# Patient Record
Sex: FEMALE | Race: ASIAN | NOT HISPANIC OR LATINO | Employment: FULL TIME | URBAN - METROPOLITAN AREA
[De-identification: names, ages, dates, MRNs, and addresses within clinical notes are randomized per-mention and may not be internally consistent; named-entity substitution may affect disease eponyms.]

---

## 2017-03-11 ENCOUNTER — OFFICE VISIT (OUTPATIENT)
Dept: FAMILY MEDICINE | Facility: CLINIC | Age: 42
End: 2017-03-11
Payer: COMMERCIAL

## 2017-03-11 VITALS
TEMPERATURE: 97.3 F | DIASTOLIC BLOOD PRESSURE: 80 MMHG | RESPIRATION RATE: 15 BRPM | HEART RATE: 78 BPM | OXYGEN SATURATION: 97 % | SYSTOLIC BLOOD PRESSURE: 122 MMHG | WEIGHT: 190 LBS | BODY MASS INDEX: 34.75 KG/M2

## 2017-03-11 DIAGNOSIS — J11.1 INFLUENZA-LIKE ILLNESS: Primary | ICD-10-CM

## 2017-03-11 DIAGNOSIS — D84.9 IMMUNOSUPPRESSION (H): ICD-10-CM

## 2017-03-11 DIAGNOSIS — Z20.828 EXPOSURE TO INFLUENZA: ICD-10-CM

## 2017-03-11 PROCEDURE — 99213 OFFICE O/P EST LOW 20 MIN: CPT | Performed by: PHYSICIAN ASSISTANT

## 2017-03-11 RX ORDER — OSELTAMIVIR PHOSPHATE 75 MG/1
75 CAPSULE ORAL 2 TIMES DAILY
Qty: 10 CAPSULE | Refills: 0 | Status: SHIPPED | OUTPATIENT
Start: 2017-03-11 | End: 2017-03-16

## 2017-03-11 NOTE — NURSING NOTE
"Chief Complaint   Patient presents with     URI       Initial /80 (BP Location: Right arm, Patient Position: Chair, Cuff Size: Adult Regular)  Pulse 78  Temp 97.3  F (36.3  C) (Tympanic)  Resp 15  Wt 190 lb (86.2 kg)  SpO2 97%  BMI 34.75 kg/m2 Estimated body mass index is 34.75 kg/(m^2) as calculated from the following:    Height as of 10/14/16: 5' 2\" (1.575 m).    Weight as of this encounter: 190 lb (86.2 kg).  Medication Reconciliation: complete    "

## 2017-03-11 NOTE — PROGRESS NOTES
SUBJECTIVE:                                                    Everette Mayen is a 41 year old female who presents to clinic today for the following health issues:    ENT Symptoms             Symptoms: cc Present Absent Comment   Fever/Chills  x     Fatigue  x     Muscle Aches   x    Eye Irritation   x    Sneezing   x    Nasal Serg/Drg  x     Sinus Pressure/Pain   x    Loss of smell   x    Dental pain   x    Sore Throat   x    Swollen Glands   x    Ear Pain/Fullness   x    Cough  x     Wheeze   x    Chest Pain   x    Shortness of breath   x    Rash   x    Other   x      Symptom duration:  9 days   Symptom severity:  moderate   Treatments tried:  cough suppressant, sinus medication   Contacts:  son       HPI additional notes:   Chief Complaint   Patient presents with     URI     Gaviria presents today with likely influenza. Patient's son is currently hospitalized with influenza and pneumonia. Patient developed URI sx just over 1 wk ago, but hasn't been able to make it in due to care of her son. Patient reports fever and chills, fatigue, congestion, cough, ST, hoarseness. Patient starting to feel better, but is concerned about son coming home. Patient also on Humira for psoriasis, so is immunosuppressed.       ROS:  Skin: negative  Eyes: negative  Ears/Nose/Throat: as above  Respiratory: as above  Cardiovascular: negative  Gastrointestinal: negative  Genitourinary: negative  Musculoskeletal: negative  Neurologic: negative  Psychiatric: negative  Hematologic/Lymphatic/Immunologic: negative  Endocrine: negative    Chart Review:  History   Smoking Status     Never Smoker   Smokeless Tobacco     Never Used       Patient Active Problem List   Diagnosis      PSORIASIS currently on Humira     Allergic rhinitis     Vitamin D deficiency     Peptic Acid Gastritis     Situational depression     Health Care Home     Benign essential hypertension     Non morbid obesity due to excess calories     Episodic mood disorder (H)      Gastritis without bleeding, unspecified chronicity, unspecified gastritis type     Acute left-sided low back pain without sciatica since 10-15     Bipolar disorder, in full remission, most recent episode depressed (H)     Past Surgical History   Procedure Laterality Date     No history of surgery       Problem list, Medication list, Allergies, Medical/Social/Surg hx reviewed in [facility], updated as appropriate.   OBJECTIVE:                                                    /80 (BP Location: Right arm, Patient Position: Chair, Cuff Size: Adult Regular)  Pulse 78  Temp 97.3  F (36.3  C) (Tympanic)  Resp 15  Wt 190 lb (86.2 kg)  SpO2 97%  BMI 34.75 kg/m2  Body mass index is 34.75 kg/(m^2).  GENERAL: fatigued but non-toxic appearing, in no acute distress  EYES: Grossly normal to inspection, no discharge, no injection  HENT: Ear canals normal; TMs pearly gray without effusion. Nasal mucosa erythematous, scant rhinorrhea. Oral mucous membranes moist, no lesions or ulcerations. Pharynx pink. Uvula midline.  NECK: Non-tender, no adenopathy.  RESP: lungs clear to auscultation - no rales, no rhonchi, no wheezes  CV: regular rate and rhythm, normal S1 S2.  No peripheral edema.  SKIN: no suspicious lesions, no rashes  PSYCH: Able to articulate logical thoughts. Affect is normal.    Diagnostic test results: none     ASSESSMENT/PLAN:                                                          ICD-10-CM    1. Influenza-like illness R69 oseltamivir (TAMIFLU) 75 MG capsule   2. Exposure to influenza Z20.828 oseltamivir (TAMIFLU) 75 MG capsule   3. Immunosuppression (H) D89.9      Given exposure and reported sx, high suspicion of influenza, therefore testing was deferred. Discussed with patient limited efficacy of Tamiflu this far into illness, but given immunosuppressed state is at high risk for prolonged/delayed recovery and complications so may be of benefit to her. Discussed no concerns about re-infection of son when he returns  as they are both fighting the same virus, main concern will be her recovery as she will likely be unable to get adequate rest when tending to him. At this time, potential benefits of Tamiflu outweigh risks, so will treat with 5 day course. Take entire course as directed. Continue use of Tylenol, ibuprofen prn pain, fever. Get plenty of rest, maintain adequate hydration, practice good handwashing.    Please see patient instructions for treatment details.    Follow up in 7-10 days if not improving as anticipated, sooner PRN.    Shelby Agee PA-C  [facility]

## 2017-03-11 NOTE — MR AVS SNAPSHOT
After Visit Summary   3/11/2017    Everette Mayen    MRN: [id number]           Patient Information     Date Of Birth          1975        Visit Information        Provider Department      3/11/2017 9:20 AM Shelby Agee PA-C [facility]        Today's Diagnoses     Influenza-like illness    -  1    Exposure to influenza        Immunosuppression (H)           Follow-ups after your visit        Who to contact     If you have questions or need follow up information about today's clinic visit or your schedule please contact [facility] directly at [phone].  Normal or non-critical lab and imaging results will be communicated to you by [employer], letter or phone within 4 business days after the clinic has received the results. If you do not hear from us within 7 days, please contact the clinic through [employer] or phone. If you have a critical or abnormal lab result, we will notify you by phone as soon as possible.  Submit refill requests through [employer] or call your pharmacy and they will forward the refill request to us. Please allow 3 business days for your refill to be completed.          Additional Information About Your Visit        MyChart Information     [employer] gives you secure access to your electronic health record. If you see a primary care provider, you can also send messages to your care team and make appointments. If you have questions, please call your primary care clinic.  If you do not have a primary care provider, please call [phone] and they will assist you.        Care EveryWhere ID     This is your Care EveryWhere ID. This could be used by other organizations to access your [facility] medical records  [id number]        Your Vitals Were     Pulse Temperature Respirations Pulse Oximetry BMI (Body Mass Index)       78 97.3  F (36.3  C) (Tympanic) 15 97% 34.75 kg/m2        Blood Pressure from Last 3 Encounters:    03/11/17 122/80   10/14/16 100/70   06/02/16 138/87    Weight from Last 3 Encounters:   03/11/17 190 lb (86.2 kg)   10/14/16 186 lb (84.4 kg)   06/02/16 178 lb 14.4 oz (81.1 kg)              Today, you had the following     No orders found for display         Today's Medication Changes          These changes are accurate as of: 3/11/17  9:41 AM.  If you have any questions, ask your nurse or doctor.               Start taking these medicines.        Dose/Directions    oseltamivir 75 MG capsule   Commonly known as:  TAMIFLU   Used for:  Exposure to influenza, Influenza-like illness   Started by:  Shelby Agee PA-C        Dose:  75 mg   Take 1 capsule (75 mg) by mouth 2 times daily for 5 days   Quantity:  10 capsule   Refills:  0            Where to get your medicines      These medications were sent to [facility] [address] [place] [address] [address] AT [address] [place] [address] & [address] [facility] [address]    Hours:  24-hours Phone:  [phone]     oseltamivir 75 MG capsule                Primary Care Provider Office Phone # Fax #    Corrie Torres [phone]       [facility] [address] [facility] [address]        Thank you!     Thank you for choosing [facility]  for your care. Our goal is always to provide you with excellent care. Hearing back from our patients is one way we can continue to improve our services. Please take a few minutes to complete the written survey that you may receive in the mail after your visit with us. Thank you!             Your Updated Medication List - Protect others around you: Learn how to safely use, store and throw away your medicines at www.disposemymeds.org.          This list is accurate as of: 3/11/17  9:41 AM.  Always use your most recent med list.                   Brand Name Dispense Instructions for use    * adalimumab 40 MG/0.8ML pen kit    HUMIRA    2 each    Inject  0.8 mLs (40 mg) Subcutaneous every 14 days       * adalimumab 40 MG/0.8ML prefilled syringe kit    HUMIRA    2 each    Inject 0.8 mLs (40 mg) Subcutaneous every 14 days       amLODIPine-benazepril 10-20 MG per capsule    LOTREL    90 capsule    Take 1 capsule by mouth daily       clobetasol 0.05 % ointment    TEMOVATE     Apply topically 2 times daily as needed (Psoriasis)       lithium 300 MG capsule      take 1 a night       oseltamivir 75 MG capsule    TAMIFLU    10 capsule    Take 1 capsule (75 mg) by mouth 2 times daily for 5 days       traZODone 50 MG tablet    DESYREL     Take 1/2 to 1 at night as needed for sleep       * Notice:  This list has 2 medication(s) that are the same as other medications prescribed for you. Read the directions carefully, and ask your doctor or other care provider to review them with you.

## 2017-05-02 ENCOUNTER — OFFICE VISIT (OUTPATIENT)
Dept: FAMILY MEDICINE | Facility: CLINIC | Age: 42
End: 2017-05-02
Payer: COMMERCIAL

## 2017-05-02 VITALS
RESPIRATION RATE: 18 BRPM | WEIGHT: 186 LBS | SYSTOLIC BLOOD PRESSURE: 120 MMHG | DIASTOLIC BLOOD PRESSURE: 80 MMHG | OXYGEN SATURATION: 96 % | HEART RATE: 91 BPM | BODY MASS INDEX: 34.23 KG/M2 | TEMPERATURE: 97.7 F | HEIGHT: 62 IN

## 2017-05-02 DIAGNOSIS — F31.76 BIPOLAR DISORDER, IN FULL REMISSION, MOST RECENT EPISODE DEPRESSED (H): ICD-10-CM

## 2017-05-02 DIAGNOSIS — L40.0 PSORIASIS VULGARIS: ICD-10-CM

## 2017-05-02 DIAGNOSIS — I10 BENIGN ESSENTIAL HYPERTENSION: ICD-10-CM

## 2017-05-02 DIAGNOSIS — E66.09 NON MORBID OBESITY DUE TO EXCESS CALORIES: ICD-10-CM

## 2017-05-02 DIAGNOSIS — Z00.00 ROUTINE GENERAL MEDICAL EXAMINATION AT A HEALTH CARE FACILITY: Primary | ICD-10-CM

## 2017-05-02 PROCEDURE — 99396 PREV VISIT EST AGE 40-64: CPT | Performed by: FAMILY MEDICINE

## 2017-05-02 NOTE — PATIENT INSTRUCTIONS
Preventive Health Recommendations  Female Ages 40 to 49    Yearly exam:     See your health care provider every year in order to  1. Review health changes.   2. Discuss preventive care.    3. Review your medicines if your doctor prescribed any.      Get a Pap test every three years (unless you have an abnormal result and your provider advises testing more often).      If you get Pap tests with HPV test, you only need to test every 5 years, unless you have an abnormal result. You do not need a Pap test if your uterus was removed (hysterectomy) and you have not had cancer.      You should be tested each year for STDs (sexually transmitted diseases), if you're at risk.       Ask your doctor if you should have a mammogram.      Have a colonoscopy (test for colon cancer) if someone in your family has had colon cancer or polyps before age 50.       Have a cholesterol test every 5 years.       Have a diabetes test (fasting glucose) after age 45. If you are at risk for diabetes, you should have this test every 3 years.    Shots: Get a flu shot each year. Get a tetanus shot every 10 years.     Nutrition:     Eat at least 5 servings of fruits and vegetables each day.    Eat whole-grain bread, whole-wheat pasta and brown rice instead of white grains and rice.    Talk to your provider about Calcium and Vitamin D.     Lifestyle    Exercise at least 150 minutes a week (an average of 30 minutes a day, 5 days a week). This will help you control your weight and prevent disease.    Limit alcohol to one drink per day.    No smoking.     Wear sunscreen to prevent skin cancer.    See your dentist every six months for an exam and cleaning.    1.  Weight Loss Tips  1. Do not eat after 6 hrs before your expected bedtime  2. Have your heaviest meal for breakfast, a slightly lighter meal at lunch and a snack 6 hrs before bed  3. No sugar/calorie drinks except milk ie no fruit juice, pop, alcohol.  4. Drink milk 30min before meals to  decrease your hunger. Also it is excellent as part of your last meal of the day snack  5. Drink lots of water  6. Increase fiber in diet: all bran cereal, salads, popcorn etc  7. Have only one small serving of fruit a day about 1/2 cup (as this is high in sugar)  8. EXERCISE is the bottom line. Without it, you will gain weight even on a low calorie diet. Best if done 2-3X a day as can    Being overweight contributes to high blood pressure and high cholesterol, both of which cause heart attacks, strokes and kidney failure, prediabetes and diabetes, arthritis, and liver disease     2. Please do your breast exam every mo, when you  Change the  calendar page or set an alarm on your cell phone Do a  visual check for dimples, inversion or indentation or any different position of the nipple Feel manually  for any 1cm or larger  size mass ie about the size of an almond Be sure to cover the entire area of both breasts : this extends back to the back on either side and from the collar bone to the bottom of the breasts where you can begin to feel ribs.    2. To decrease your blood pressure:   1) decrease your salt in your diet   2) increase greens   3) decrease red meat   4) increase fiber in diet   5) increase exercise    3. . Schedule your mammo at [facility]  At [address] at [phone]      4. 5.5 mo  For a physical

## 2017-05-02 NOTE — PROGRESS NOTES
SUBJECTIVE:     CC: Everette Mayen is an 41 year old woman who presents for preventive health visit.     Healthy Habits:    Do you get at least three servings of calcium containing foods daily (dairy, green leafy vegetables, etc.)? yes    Amount of exercise or daily activities, outside of work: 2 day(s) per week    Problems taking medications regularly No    Medication side effects: No    Have you had an eye exam in the past two years? yes    Do you see a dentist twice per year? yes    Do you have sleep apnea, excessive snoring or daytime drowsiness?no            Today's PHQ-2 Score:   PHQ-2 ( 1999 [employer]) 5/2/2017 5/28/2016   Q1: Little interest or pleasure in doing things 0 -   Q2: Feeling down, depressed or hopeless 0 -   PHQ-2 Score 0 -   Little interest or pleasure in doing things - Not at all   Feeling down, depressed or hopeless - Not at all   PHQ-2 Score - 0       Abuse: Current or Past(Physical, Sexual or Emotional)- No  Do you feel safe in your environment - Yes    Social History   Substance Use Topics     Smoking status: Never Smoker     Smokeless tobacco: Never Used     Alcohol use 0.0 oz/week     0 Standard drinks or equivalent per week      Comment: < 1 drink / week     The patient does not drink >3 drinks per day nor >7 drinks per week.    Recent Labs   Lab Test  02/07/14   1000   CHOL  171   HDL  44*   LDL  102   TRIG  122   CHOLHDLRATIO  4.0       Reviewed orders with patient.  Reviewed health maintenance and updated orders accordingly - Yes    Mammo Decision Support:  Mammogram not appropriate for this patient based on age.    Pertinent mammograms are reviewed under the imaging tab.  History of abnormal Pap smear:   Last 3 Pap Results:   PAP (no units)   Date Value   06/02/2016 NIL   07/26/2012 NIL   09/16/2008 NIL       Reviewed and updated as needed this visit by clinical staff  Tobacco  Allergies  Meds  Problems  Med Hx  Surg Hx  Fam Hx  Soc Hx          Reviewed and updated as  needed this visit by Provider            ROS:  C: NEGATIVE for fever, chills, change in weight  I: NEGATIVE for worrisome rashes, moles or lesions  E: NEGATIVE for vision changes or irritation  ENT: NEGATIVE for ear, mouth and throat problems  R: NEGATIVE for significant cough or SOB  B: NEGATIVE for masses, tenderness or discharge  CV: NEGATIVE for chest pain, palpitations or peripheral edema  GI: NEGATIVE for nausea, abdominal pain, heartburn, or change in bowel habits  : NEGATIVE for unusual urinary or vaginal symptoms. Periods are regular.  M: NEGATIVE for significant arthralgias or myalgia  N: NEGATIVE for weakness, dizziness or paresthesias  P: NEGATIVE for changes in mood or affect    Problem list, Medication list, Allergies, and Medical/Social/Surgical histories reviewed in [facility] and updated as appropriate.  Labs reviewed in EPIC  OBJECTIVE:     There were no vitals taken for this visit.  EXAM:  GENERAL: healthy, alert, no distress and obese  EYES: Eyes grossly normal to inspection, PERRL and conjunctivae and sclerae normal  HENT: ear canals and TM's normal, nose and mouth without ulcers or lesions  NECK: no adenopathy, no asymmetry, masses, or scars and thyroid normal to palpation  RESP: lungs clear to auscultation - no rales, rhonchi or wheezes  BREAST: normal without masses, tenderness or nipple discharge and no palpable axillary masses or adenopathy  CV: regular rate and rhythm, normal S1 S2, no S3 or S4, no murmur, click or rub, no peripheral edema and peripheral pulses strong  ABDOMEN: soft, nontender, no hepatosplenomegaly, no masses and bowel sounds normal  MS: no gross musculoskeletal defects noted, no edema  SKIN: no suspicious lesions or rashes  NEURO: Normal strength and tone, mentation intact and speech normal  PSYCH: mentation appears normal, affect normal/bright  LYMPH: no cervical, supraclavicular, axillary, or inguinal adenopathy    ASSESSMENT/PLAN:         ICD-10-CM    1. Routine general  "medical examination at a health care facility Z00.00    2. Psoriasis vulgaris L40.0    3. Non morbid obesity due to excess calories E66.09    4. Benign essential hypertension I10    5. Bipolar disorder, in full remission, most recent episode depressed (H)-CONMTROLLED F31.76        COUNSELING:   Reviewed preventive health counseling, as reflected in patient instructions       Regular exercise       Healthy diet/nutrition         reports that she has never smoked. She has never used smokeless tobacco.    Estimated body mass index is 34.75 kg/(m^2) as calculated from the following:    Height as of 10/14/16: 5' 2\" (1.575 m).    Weight as of 3/11/17: 190 lb (86.2 kg).   Weight management plan: Discussed healthy diet and exercise guidelines and patient will follow up in 6 months in clinic to re-evaluate.    Counseling Resources:  ATP IV Guidelines  Pooled Cohorts Equation Calculator  Breast Cancer Risk Calculator  FRAX Risk Assessment  ICSI Preventive Guidelines  Dietary Guidelines for Americans, 2010  [employer] MyPlate  ASA Prophylaxis  Lung CA Screening    Patient Instructions     Preventive Health Recommendations  Female Ages 40 to 49    Yearly exam:     See your health care provider every year in order to  1. Review health changes.   2. Discuss preventive care.    3. Review your medicines if your doctor prescribed any.      Get a Pap test every three years (unless you have an abnormal result and your provider advises testing more often).      If you get Pap tests with HPV test, you only need to test every 5 years, unless you have an abnormal result. You do not need a Pap test if your uterus was removed (hysterectomy) and you have not had cancer.      You should be tested each year for STDs (sexually transmitted diseases), if you're at risk.       Ask your doctor if you should have a mammogram.      Have a colonoscopy (test for colon cancer) if someone in your family has had colon cancer or polyps before age 50. "       Have a cholesterol test every 5 years.       Have a diabetes test (fasting glucose) after age 45. If you are at risk for diabetes, you should have this test every 3 years.    Shots: Get a flu shot each year. Get a tetanus shot every 10 years.     Nutrition:     Eat at least 5 servings of fruits and vegetables each day.    Eat whole-grain bread, whole-wheat pasta and brown rice instead of white grains and rice.    Talk to your provider about Calcium and Vitamin D.     Lifestyle    Exercise at least 150 minutes a week (an average of 30 minutes a day, 5 days a week). This will help you control your weight and prevent disease.    Limit alcohol to one drink per day.    No smoking.     Wear sunscreen to prevent skin cancer.    See your dentist every six months for an exam and cleaning.    1.  Weight Loss Tips  1. Do not eat after 6 hrs before your expected bedtime  2. Have your heaviest meal for breakfast, a slightly lighter meal at lunch and a snack 6 hrs before bed  3. No sugar/calorie drinks except milk ie no fruit juice, pop, alcohol.  4. Drink milk 30min before meals to decrease your hunger. Also it is excellent as part of your last meal of the day snack  5. Drink lots of water  6. Increase fiber in diet: all bran cereal, salads, popcorn etc  7. Have only one small serving of fruit a day about 1/2 cup (as this is high in sugar)  8. EXERCISE is the bottom line. Without it, you will gain weight even on a low calorie diet. Best if done 2-3X a day as can    Being overweight contributes to high blood pressure and high cholesterol, both of which cause heart attacks, strokes and kidney failure, prediabetes and diabetes, arthritis, and liver disease           Corrie Torres MD  [facility]

## 2017-05-02 NOTE — MR AVS SNAPSHOT
After Visit Summary   5/2/2017    Everette Mayen    MRN: [id number]           Patient Information     Date Of Birth          1975        Visit Information        Provider Department      5/2/2017 9:00 AM Corrie Torres MD [facility]        Today's Diagnoses     Routine general medical examination at a health care facility    -  1    Psoriasis vulgaris         PSORIASIS currently on Humira          Care Instructions      Preventive Health Recommendations  Female Ages 40 to 49    Yearly exam:     See your health care provider every year in order to  1. Review health changes.   2. Discuss preventive care.    3. Review your medicines if your doctor prescribed any.      Get a Pap test every three years (unless you have an abnormal result and your provider advises testing more often).      If you get Pap tests with HPV test, you only need to test every 5 years, unless you have an abnormal result. You do not need a Pap test if your uterus was removed (hysterectomy) and you have not had cancer.      You should be tested each year for STDs (sexually transmitted diseases), if you're at risk.       Ask your doctor if you should have a mammogram.      Have a colonoscopy (test for colon cancer) if someone in your family has had colon cancer or polyps before age 50.       Have a cholesterol test every 5 years.       Have a diabetes test (fasting glucose) after age 45. If you are at risk for diabetes, you should have this test every 3 years.    Shots: Get a flu shot each year. Get a tetanus shot every 10 years.     Nutrition:     Eat at least 5 servings of fruits and vegetables each day.    Eat whole-grain bread, whole-wheat pasta and brown rice instead of white grains and rice.    Talk to your provider about Calcium and Vitamin D.     Lifestyle    Exercise at least 150 minutes a week (an average of 30 minutes a day, 5 days a week). This will help you control your  weight and prevent disease.    Limit alcohol to one drink per day.    No smoking.     Wear sunscreen to prevent skin cancer.    See your dentist every six months for an exam and cleaning.    1.  Weight Loss Tips  1. Do not eat after 6 hrs before your expected bedtime  2. Have your heaviest meal for breakfast, a slightly lighter meal at lunch and a snack 6 hrs before bed  3. No sugar/calorie drinks except milk ie no fruit juice, pop, alcohol.  4. Drink milk 30min before meals to decrease your hunger. Also it is excellent as part of your last meal of the day snack  5. Drink lots of water  6. Increase fiber in diet: all bran cereal, salads, popcorn etc  7. Have only one small serving of fruit a day about 1/2 cup (as this is high in sugar)  8. EXERCISE is the bottom line. Without it, you will gain weight even on a low calorie diet. Best if done 2-3X a day as can    Being overweight contributes to high blood pressure and high cholesterol, both of which cause heart attacks, strokes and kidney failure, prediabetes and diabetes, arthritis, and liver disease     2. Please do your breast exam every mo, when you  Change the  calendar page or set an alarm on your cell phone Do a  visual check for dimples, inversion or indentation or any different position of the nipple Feel manually  for any 1cm or larger  size mass ie about the size of an almond Be sure to cover the entire area of both breasts : this extends back to the back on either side and from the collar bone to the bottom of the breasts where you can begin to feel ribs.    2. To decrease your blood pressure:   1) decrease your salt in your diet   2) increase greens   3) decrease red meat   4) increase fiber in diet   5) increase exercise    3. . Schedule your mammo at [facility]  At [address] at [phone]      4. 5.5 mo  For a physical         Follow-ups after your visit        Follow-up notes from your care team     Return in about 6 months (around  "11/2/2017) for Physical Exam.      Your next 10 appointments already scheduled     May 05, 2017  9:30 AM CDT   (Arrive by 9:15 AM)   Return Visit with Moo Montes MD   [facility] Dermatology ([facility])    [address]  [address] [facility] [address] [phone]              Who to contact     If you have questions or need follow up information about today's clinic visit or your schedule please contact [facility] directly at [phone].  Normal or non-critical lab and imaging results will be communicated to you by [employer], letter or phone within 4 business days after the clinic has received the results. If you do not hear from us within 7 days, please contact the clinic through [employer] Payment Systemshart or phone. If you have a critical or abnormal lab result, we will notify you by phone as soon as possible.  Submit refill requests through [employer] or call your pharmacy and they will forward the refill request to us. Please allow 3 business days for your refill to be completed.          Additional Information About Your Visit        MyChart Information     [employer] gives you secure access to your electronic health record. If you see a primary care provider, you can also send messages to your care team and make appointments. If you have questions, please call your primary care clinic.  If you do not have a primary care provider, please call [phone] and they will assist you.        Care EveryWhere ID     This is your Care EveryWhere ID. This could be used by other organizations to access your [place] medical records  [id number]        Your Vitals Were     Pulse Temperature Respirations Height Last Period Pulse Oximetry    91 97.7  F (36.5  C) (Tympanic) 18 5' 2\" (1.575 m) 04/01/2017 (Approximate) 96%    Breastfeeding? BMI (Body Mass Index)                No 34.02 kg/m2           Blood Pressure from Last 3 Encounters:   05/02/17 120/80   03/11/17 122/80 "   10/14/16 100/70    Weight from Last 3 Encounters:   05/02/17 186 lb (84.4 kg)   03/11/17 190 lb (86.2 kg)   10/14/16 186 lb (84.4 kg)              Today, you had the following     No orders found for display       Primary Care Provider Office Phone # Fax #    Corrie Torres [phone]       [facility] [address] [facility] [address]        Thank you!     Thank you for choosing [facility]  for your care. Our goal is always to provide you with excellent care. Hearing back from our patients is one way we can continue to improve our services. Please take a few minutes to complete the written survey that you may receive in the mail after your visit with us. Thank you!             Your Updated Medication List - Protect others around you: Learn how to safely use, store and throw away your medicines at www.disposemymeds.org.          This list is accurate as of: 5/2/17  9:35 AM.  Always use your most recent med list.                   Brand Name Dispense Instructions for use    adalimumab 40 MG/0.8ML prefilled syringe kit    HUMIRA    2 each    Inject 0.8 mLs (40 mg) Subcutaneous every 14 days       amLODIPine-benazepril 10-20 MG per capsule    LOTREL    90 capsule    Take 1 capsule by mouth daily       clobetasol 0.05 % ointment    TEMOVATE     Apply topically 2 times daily as needed (Psoriasis)       lithium 300 MG capsule      take 1 a night       traZODone 50 MG tablet    DESYREL     Take 1/2 to 1 at night as needed for sleep

## 2017-05-02 NOTE — LETTER
My Depression Action Plan  Name: Everette Mayen   Date of Birth 1975  Date: 5/2/2017    My doctor: Corrie Torres   My clinic: [address] [address]  [phone]          GREEN    ZONE   Good Control    What it looks like:     Things are going generally well. You have normal up s and down s. You may even feel depressed from time to time, but bad moods usually last less than a day.   What you need to do:  1. Continue to care for yourself (see self care plan)  2. Check your depression survival kit and update it as needed  3. Follow your physician s recommendations including any medication.  4. Do not stop taking medication unless you consult with your physician first.           YELLOW         ZONE Getting Worse    What it looks like:     Depression is starting to interfere with your life.     It may be hard to get out of bed; you may be starting to isolate yourself from others.    Symptoms of depression are starting to last most all day and this has happened for several days.     You may have suicidal thoughts but they are not constant.   What you need to do:     1. Call your care team, your response to treatment will improve if you keep your care team informed of your progress. Yellow periods are signs an adjustment may need to be made.     2. Continue your self-care, even if you have to fake it!    3. Talk to someone in your support network    4. Open up your depression survival kit           RED    ZONE Medical Alert - Get Help    What it looks like:     Depression is seriously interfering with your life.     You may experience these or other symptoms: You can t get out of bed most days, can t work or engage in other necessary activities, you have trouble taking care of basic hygiene, or basic responsibilities, thoughts of suicide or death that will not go away, self-injurious behavior.     What  you need to do:  1. Call your care team and request a same-day appointment. If they are not available (weekends or after hours) call your local crisis line, emergency room or 911.      Electronically signed by: Corrie Torres, May 2, 2017    Depression Self Care Plan / Survival Kit    Self-Care for Depression  Here s the deal. Your body and mind are really not as separate as most people think.  What you do and think affects how you feel and how you feel influences what you do and think. This means if you do things that people who feel good do, it will help you feel better.  Sometimes this is all it takes.  There is also a place for medication and therapy depending on how severe your depression is, so be sure to consult with your medical provider and/ or Behavioral Health Consultant if your symptoms are worsening or not improving.     In order to better manage my stress, I will:    Exercise  Get some form of exercise, every day. This will help reduce pain and release endorphins, the  feel good  chemicals in your brain. This is almost as good as taking antidepressants!  This is not the same as joining a gym and then never going! (they count on that by the way ) It can be as simple as just going for a walk or doing some gardening, anything that will get you moving.      Hygiene   Maintain good hygiene (Get out of bed in the morning, Make your bed, Brush your teeth, Take a shower, and Get dressed like you were going to work, even if you are unemployed).  If your clothes don't fit try to get ones that do.    Diet  I will strive to eat foods that are good for me, drink plenty of water, and avoid excessive sugar, caffeine, alcohol, and other mood-altering substances.  Some foods that are helpful in depression are: complex carbohydrates, B vitamins, flaxseed, fish or fish oil, fresh fruits and vegetables.    Psychotherapy  I agree to participate in Individual Therapy (if recommended).    Medication  If prescribed  medications, I agree to take them.  Missing doses can result in serious side effects.  I understand that drinking alcohol, or other illicit drug use, may cause potential side effects.  I will not stop my medication abruptly without first discussing it with my provider.    Staying Connected With Others  I will stay in touch with my friends, family members, and my primary care provider/team.    Use your imagination  Be creative.  We all have a creative side; it doesn t matter if it s oil painting, sand castles, or mud pies! This will also kick up the endorphins.    Witness Beauty  (AKA stop and smell the roses) Take a look outside, even in mid-winter. Notice colors, textures. Watch the squirrels and birds.     Service to others  Be of service to others.  There is always someone else in need.  By helping others we can  get out of ourselves  and remember the really important things.  This also provides opportunities for practicing all the other parts of the program.    Humor  Laugh and be silly!  Adjust your TV habits for less news and crime-drama and more comedy.    Control your stress  Try breathing deep, massage therapy, biofeedback, and meditation. Find time to relax each day.     My support system    Clinic Contact:  Phone number:    Contact 1:  Phone number:    Contact 2:  Phone number:    Quaker/:  Phone number:    Therapist:  Phone number:    [facility] crisis center:    Phone number:    Other community support:  Phone number:

## 2017-05-02 NOTE — NURSING NOTE
"Chief Complaint   Patient presents with     Physical     /80  Pulse 91  Temp 97.7  F (36.5  C) (Tympanic)  Resp 18  Ht 5' 2\" (1.575 m)  Wt 186 lb (84.4 kg)  LMP 04/01/2017 (Approximate)  SpO2 96%  Breastfeeding? No  BMI 34.02 kg/m2 Estimated body mass index is 34.02 kg/(m^2) as calculated from the following:    Height as of this encounter: 5' 2\" (1.575 m).    Weight as of this encounter: 186 lb (84.4 kg).  BP completed using cuff size: regular   Alea Hernandez CMA    Health Maintenance Due   Topic Date Due     PHQ-9 Q6 MONTHS (NO INBASKET)  04/13/2017     Health Maintenance reviewed at today's visit patient asked to schedule/complete:   Depression:  Patient agrees to schedule    "

## 2017-05-03 ASSESSMENT — PATIENT HEALTH QUESTIONNAIRE - PHQ9: SUM OF ALL RESPONSES TO PHQ QUESTIONS 1-9: 1

## 2017-05-05 ENCOUNTER — OFFICE VISIT (OUTPATIENT)
Dept: DERMATOLOGY | Facility: CLINIC | Age: 42
End: 2017-05-05

## 2017-05-05 DIAGNOSIS — L40.0 PSORIASIS VULGARIS: ICD-10-CM

## 2017-05-05 DIAGNOSIS — Z79.620 ADALIMUMAB (HUMIRA) LONG-TERM USE: ICD-10-CM

## 2017-05-05 DIAGNOSIS — L40.9 PSORIASIS: ICD-10-CM

## 2017-05-05 DIAGNOSIS — L40.0 PSORIASIS VULGARIS: Primary | ICD-10-CM

## 2017-05-05 LAB
ALBUMIN SERPL-MCNC: 3.9 G/DL (ref 3.4–5)
ALP SERPL-CCNC: 74 U/L (ref 40–150)
ALT SERPL W P-5'-P-CCNC: 22 U/L (ref 0–50)
ANION GAP SERPL CALCULATED.3IONS-SCNC: 8 MMOL/L (ref 3–14)
AST SERPL W P-5'-P-CCNC: 18 U/L (ref 0–45)
BILIRUB SERPL-MCNC: 0.5 MG/DL (ref 0.2–1.3)
BUN SERPL-MCNC: 12 MG/DL (ref 7–30)
CALCIUM SERPL-MCNC: 8.7 MG/DL (ref 8.5–10.1)
CHLORIDE SERPL-SCNC: 107 MMOL/L (ref 94–109)
CO2 SERPL-SCNC: 25 MMOL/L (ref 20–32)
CREAT SERPL-MCNC: 0.42 MG/DL (ref 0.52–1.04)
ERYTHROCYTE [DISTWIDTH] IN BLOOD BY AUTOMATED COUNT: 13 % (ref 10–15)
GFR SERPL CREATININE-BSD FRML MDRD: ABNORMAL ML/MIN/1.7M2
GLUCOSE SERPL-MCNC: 74 MG/DL (ref 70–99)
HBV SURFACE AG SERPL QL IA: NONREACTIVE
HCT VFR BLD AUTO: 43 % (ref 35–47)
HCV AB SERPL QL IA: NORMAL
HGB BLD-MCNC: 14.2 G/DL (ref 11.7–15.7)
HIV 1+2 AB+HIV1 P24 AG SERPL QL IA: NORMAL
MCH RBC QN AUTO: 28.5 PG (ref 26.5–33)
MCHC RBC AUTO-ENTMCNC: 33 G/DL (ref 31.5–36.5)
MCV RBC AUTO: 86 FL (ref 78–100)
PLATELET # BLD AUTO: 325 10E9/L (ref 150–450)
POTASSIUM SERPL-SCNC: 4 MMOL/L (ref 3.4–5.3)
PROT SERPL-MCNC: 8.7 G/DL (ref 6.8–8.8)
RBC # BLD AUTO: 4.99 10E12/L (ref 3.8–5.2)
SODIUM SERPL-SCNC: 140 MMOL/L (ref 133–144)
WBC # BLD AUTO: 4.8 10E9/L (ref 4–11)

## 2017-05-05 RX ORDER — CLOBETASOL PROPIONATE 0.5 MG/ML
SOLUTION TOPICAL
Qty: 50 ML | Refills: 3 | Status: SHIPPED | OUTPATIENT
Start: 2017-05-05 | End: 2021-12-28

## 2017-05-05 ASSESSMENT — PAIN SCALES - GENERAL: PAINLEVEL: NO PAIN (0)

## 2017-05-05 NOTE — MR AVS SNAPSHOT
After Visit Summary   5/5/2017    Everette Mayen    MRN: [id number]           Patient Information     Date Of Birth          1975        Visit Information        Provider Department      5/5/2017 9:30 AM Moo Montes MD [facility] Dermatology        Today's Diagnoses     Psoriasis vulgaris    -  1    Adalimumab (Humira) long-term use        Psoriasis           Follow-ups after your visit        Follow-up notes from your care team     Return in about 6 months (around 11/5/2017).      Who to contact     Please call your clinic at [phone] to:    Ask questions about your health    Make or cancel appointments    Discuss your medicines    Learn about your test results    Speak to your doctor   If you have compliments or concerns about an experience at your clinic, or if you wish to file a complaint, please contact [facility] Patient Relations at [phone] or email us at [email or web address]         Additional Information About Your Visit        MyChart Information     [employer] gives you secure access to your electronic health record. If you see a primary care provider, you can also send messages to your care team and make appointments. If you have questions, please call your primary care clinic.  If you do not have a primary care provider, please call [phone] and they will assist you.      [employer] is an electronic gateway that provides easy, online access to your medical records. With [employer], you can request a clinic appointment, read your test results, renew a prescription or communicate with your care team.     To access your existing account, please contact your [facility] Clinic or call [phone] for assistance.        Care EveryWhere ID     This is your Care EveryWhere ID. This could be used by other organizations to access your [place] medical records  [id number]        Your Vitals Were     Last Period                    04/01/2017 (Approximate)            Blood Pressure from Last 3 Encounters:   05/02/17 120/80   03/11/17 122/80   10/14/16 100/70    Weight from Last 3 Encounters:   05/02/17 84.4 kg (186 lb)   03/11/17 86.2 kg (190 lb)   10/14/16 84.4 kg (186 lb)              We Performed the Following     Hepatitis B surface antigen     Hepatitis C antibody     M Tuberculosis by Quantiferon          Today's Medication Changes          These changes are accurate as of: 5/5/17 11:59 PM.  If you have any questions, ask your nurse or doctor.               These medicines have changed or have updated prescriptions.        Dose/Directions    * clobetasol 0.05 % ointment   Commonly known as:  TEMOVATE   This may have changed:  Another medication with the same name was added. Make sure you understand how and when to take each.        Apply topically 2 times daily as needed (Psoriasis)   Refills:  0       * clobetasol 0.05 % external solution   Commonly known as:  TEMOVATE   This may have changed:  You were already taking a medication with the same name, and this prescription was added. Make sure you understand how and when to take each.   Used for:  Psoriasis vulgaris   Changed by:  Moo Montes MD        Apply to scalp daily as needed for psoriasis flare   Quantity:  50 mL   Refills:  3       * Notice:  This list has 2 medication(s) that are the same as other medications prescribed for you. Read the directions carefully, and ask your doctor or other care provider to review them with you.         Where to get your medicines      These medications were sent to [facility] - [place], [place] - [address] [place] [address]  [address] [place] [address], [facility] [address]    Hours:  Mon-Fri 8:30am-5:00pm Toll Free [phone] Phone:  [phone]     adalimumab 40 MG/0.8ML prefilled syringe kit    clobetasol 0.05 % external solution                Primary Care Provider Office Phone # Fax #    Corrie Brooks  MD Brian [phone]       [facility] [address] [address]  [facility] [address]        Thank you!     Thank you for choosing [facility] DERMATOLOGY  for your care. Our goal is always to provide you with excellent care. Hearing back from our patients is one way we can continue to improve our services. Please take a few minutes to complete the written survey that you may receive in the mail after your visit with us. Thank you!             Your Updated Medication List - Protect others around you: Learn how to safely use, store and throw away your medicines at www.disposemymeds.org.          This list is accurate as of: 5/5/17 11:59 PM.  Always use your most recent med list.                   Brand Name Dispense Instructions for use    adalimumab 40 MG/0.8ML prefilled syringe kit    HUMIRA    2 each    Inject 0.8 mLs (40 mg) Subcutaneous every 14 days       amLODIPine-benazepril 10-20 MG per capsule    LOTREL    90 capsule    Take 1 capsule by mouth daily       * clobetasol 0.05 % ointment    TEMOVATE     Apply topically 2 times daily as needed (Psoriasis)       * clobetasol 0.05 % external solution    TEMOVATE    50 mL    Apply to scalp daily as needed for psoriasis flare       lithium 300 MG capsule      take 1 a night       traZODone 50 MG tablet    DESYREL     Take 1/2 to 1 at night as needed for sleep       * Notice:  This list has 2 medication(s) that are the same as other medications prescribed for you. Read the directions carefully, and ask your doctor or other care provider to review them with you.

## 2017-05-05 NOTE — PROGRESS NOTES
"[facility] Dermatology Note      Dermatology Problem List:  1. Psoriasis: On Humira 40 mg [id number] and clobetasol spot treatment to scalp and elbows  - previous 80-90% TBSA involvement.  2. Seborrheic keratosis  3. Acrochordon    Encounter Date: Dec 2, 2016    CC:  Chief Complaint   Patient presents with     Derm Problem     Everette is here today for a psoriasis follow up. Everette notes\" my psoriasis is doing really good\"         History of Present Illness:  Ms. Everette Mayen is a 41 year old female who presents as a follow-up for psoriasis on Humira 40mg SC q2w. She is doing well. Denies joint pain. Mild involvement of elbows but doesn't even feel the need to treat them though she has  clobetasol 0.05% ointment available.    No fevers, chills, weight loss. Did have influenza over the winter, held humira.       She denies any known exposure to TB. She has no other skin concerns and otherwise feels well.    Past Medical History:   Patient Active Problem List   Diagnosis      PSORIASIS currently on Humira     Allergic rhinitis     Vitamin D deficiency     Peptic Acid Gastritis     Situational depression     Health Care Home     Benign essential hypertension     Non morbid obesity due to excess calories     Episodic mood disorder (H)     Gastritis without bleeding, unspecified chronicity, unspecified gastritis type     Acute left-sided low back pain without sciatica since 10-15     Bipolar disorder, in full remission, most recent episode depressed (H)     Past Medical History   Diagnosis Date     Depressive disorder, not elsewhere classified      Other psoriasis      Allergic rhinitis, cause unspecified      Unspecified complication of pregnancy, unspecified as to episode of care 3/09     Willis's/2nd trimester termination     Hypertension 2008     Past Surgical History   Procedure Laterality Date     No history of surgery         Social History:  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     Family History:  There is no family history of skin cancer.    Medications:  Current Outpatient Prescriptions   Medication Sig Dispense Refill     amLODIPine-benazepril (LOTREL) 10-20 MG per capsule Take 1 capsule by mouth daily 90 capsule 3     traZODone (DESYREL) 50 MG tablet Take 1/2 to 1 at night as needed for sleep       lithium 300 MG capsule Take  2 at night       adalimumab (HUMIRA) 40 MG/0.8ML pen kit Inject 0.8 mLs (40 mg) Subcutaneous every 14 days 2 each 5     clobetasol (TEMOVATE) 0.05 % ointment Apply topically 2 times daily as needed (Psoriasis)       Allergies   Allergen Reactions     Lamotrigine      Other reaction(s): Rash     Sulfa Drugs Hives     Other reaction(s): Lupus Erythematous         Review of Systems:  -As per HPI  -Constitutional: The patient denies fatigue, fevers, chills, unintended weight loss, night sweats, cough, increasing fatigue,sensory/neuropathy changes.  -HEENT: Patient denies nonhealing oral sores.  -Skin:   No additional skin concerns.    Physical exam:  Vitals: There were no vitals taken for this visit.  GEN: This is a well developed, well-nourished female in no acute distress, in a pleasant mood.    LYMPH: No cervical lymphadenopathy  SKIN:Focused exam, which includes the head/face, neck, both arms, lower leg   -On posterior scalp scaly, pink patch with loosely adherent scale.   -On elbows bilaterally, discrete light pink papules with white, dry scale, similar papule on R lower leg  -No other lesions of concern on areas examined.       Impression/Plan:  1. Psoriasis: Well-controlled with improvement in PASI > 90% on adalimumab. Patient does not describe any side effects of therapy today. No increased respiratory infections, no lymphadenopathy. Due for annual  laboratory evaluation today. She denies rheumatologic symptoms.      Continue Humira 40 mg l20diesl.      Continue clobetasol 0.05% ointment as spot treatment to affected areas on body, clobetasol 0.05% to  scalp daily as needed     Quant gold, HIV, hep B/C,  AST/ALT and CBC        CC Dr. Corrie Torres on close of this encounter.  Follow-up in 6 months, earlier for new or changing lesions.     Staff Involved:  This patient was seen and staffed with Dr. Montes,  Dermatology attending.     Corina Cain MD  Medicine/Dermatology, PGY-4  (p) [phone]  The entire visit (except PFSH) was performed by myself with the student acting as a scribe.  Moo Montes MD  FAAD

## 2017-05-05 NOTE — NURSING NOTE
Dermatology Rooming Note    Everette Mayen's goals for this visit include:   Chief Complaint   Patient presents with     Derm Problem     Everette is here today for a psoriasis follow up.      Marian Lua MA

## 2017-05-05 NOTE — LETTER
"5/5/2017     RE: Everette Mayen  [address] [address]     Dear Colleague,    Thank you for referring your patient, Everette Mayen, to the [facility] DERMATOLOGY at [facility]. Please see a copy of my visit note below.    [facility] Dermatology Note      Dermatology Problem List:  1. Psoriasis: On Humira 40 mg [id number] and clobetasol spot treatment to scalp and elbows  - previous 80-90% TBSA involvement.  2. Seborrheic keratosis  3. Acrochordon    Encounter Date: Dec 2, 2016    CC:  Chief Complaint   Patient presents with     Derm Problem     Everette is here today for a psoriasis follow up. Everette notes\" my psoriasis is doing really good\"         History of Present Illness:  Ms. Everette Mayen is a 41 year old female who presents as a follow-up for psoriasis on Humira 40mg SC q2w. She is doing well. Denies joint pain. Mild involvement of elbows but doesn't even feel the need to treat them though she has  clobetasol 0.05% ointment available.    No fevers, chills, weight loss. Did have influenza over the winter, held humira.       She denies any known exposure to TB. She has no other skin concerns and otherwise feels well.    Past Medical History:   Patient Active Problem List   Diagnosis      PSORIASIS currently on Humira     Allergic rhinitis     Vitamin D deficiency     Peptic Acid Gastritis     Situational depression     Health Care Home     Benign essential hypertension     Non morbid obesity due to excess calories     Episodic mood disorder (H)     Gastritis without bleeding, unspecified chronicity, unspecified gastritis type     Acute left-sided low back pain without sciatica since 10-15     Bipolar disorder, in full remission, most recent episode depressed (H)     Past Medical History   Diagnosis Date     Depressive disorder, not elsewhere classified      Other psoriasis      Allergic rhinitis, cause unspecified      Unspecified " complication of pregnancy, unspecified as to episode of care 3/09     Willis's/2nd trimester termination     Hypertension 2008     Past Surgical History   Procedure Laterality Date     No history of surgery         Social History:  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     Family History:  There is no family history of skin cancer.    Medications:  Current Outpatient Prescriptions   Medication Sig Dispense Refill     amLODIPine-benazepril (LOTREL) 10-20 MG per capsule Take 1 capsule by mouth daily 90 capsule 3     traZODone (DESYREL) 50 MG tablet Take 1/2 to 1 at night as needed for sleep       lithium 300 MG capsule Take  2 at night       adalimumab (HUMIRA) 40 MG/0.8ML pen kit Inject 0.8 mLs (40 mg) Subcutaneous every 14 days 2 each 5     clobetasol (TEMOVATE) 0.05 % ointment Apply topically 2 times daily as needed (Psoriasis)       Allergies   Allergen Reactions     Lamotrigine      Other reaction(s): Rash     Sulfa Drugs Hives     Other reaction(s): Lupus Erythematous         Review of Systems:  -As per HPI  -Constitutional: The patient denies fatigue, fevers, chills, unintended weight loss, night sweats, cough, increasing fatigue,sensory/neuropathy changes.  -HEENT: Patient denies nonhealing oral sores.  -Skin:   No additional skin concerns.    Physical exam:  Vitals: There were no vitals taken for this visit.  GEN: This is a well developed, well-nourished female in no acute distress, in a pleasant mood.    LYMPH: No cervical lymphadenopathy  SKIN:Focused exam, which includes the head/face, neck, both arms, lower leg   -On posterior scalp scaly, pink patch with loosely adherent scale.   -On elbows bilaterally, discrete light pink papules with white, dry scale, similar papule on R lower leg  -No other lesions of concern on areas examined.       Impression/Plan:  1. Psoriasis: Well-controlled with improvement in PASI > 90% on adalimumab. Patient does not describe any side effects of therapy  today. No increased respiratory infections, no lymphadenopathy. Due for annual  laboratory evaluation today. She denies rheumatologic symptoms.      Continue Humira 40 mg [id number].      Continue clobetasol 0.05% ointment as spot treatment to affected areas on body, clobetasol 0.05% to scalp daily as needed     Quant gold, HIV, hep B/C,  AST/ALT and CBC        CC Dr. Corrie Torres on close of this encounter.  Follow-up in 6 months, earlier for new or changing lesions.     Staff Involved:  This patient was seen and staffed with Dr. Montes,  Dermatology attending.     Corina Cain MD  Medicine/Dermatology, PGY-4  (p) [phone]  The entire visit (except PFSH) was performed by myself with the student acting as a scribe.  Moo Montes MD  FAAD    Again, thank you for allowing me to participate in the care of your patient.      Sincerely,    Moo Montes MD

## 2017-05-08 LAB
M TB TUBERC IFN-G BLD QL: NEGATIVE
M TB TUBERC IFN-G/MITOGEN IGNF BLD: 0 IU/ML

## 2017-05-25 ENCOUNTER — RADIANT APPOINTMENT (OUTPATIENT)
Dept: MAMMOGRAPHY | Facility: CLINIC | Age: 42
End: 2017-05-25
Attending: FAMILY MEDICINE
Payer: COMMERCIAL

## 2017-05-25 DIAGNOSIS — Z12.31 VISIT FOR SCREENING MAMMOGRAM: ICD-10-CM

## 2017-05-25 PROCEDURE — G0202 SCR MAMMO BI INCL CAD: HCPCS

## 2017-05-31 ENCOUNTER — TELEPHONE (OUTPATIENT)
Dept: FAMILY MEDICINE | Facility: CLINIC | Age: 42
End: 2017-05-31

## 2017-05-31 DIAGNOSIS — R92.8 ABNORMAL MAMMOGRAM OF RIGHT BREAST: ICD-10-CM

## 2017-05-31 NOTE — TELEPHONE ENCOUNTER
Elgin from [facility] called requesting provider sign orders placed for Dx mammogram. Please see orders from 05/30/2017 and sign if agree with order. Radiology asked this be done today if possible in order for pt to schedule.   Elgin can be called at [phone] with order is signed.

## 2017-08-21 ENCOUNTER — TELEPHONE (OUTPATIENT)
Dept: DERMATOLOGY | Facility: CLINIC | Age: 42
End: 2017-08-21

## 2017-08-21 NOTE — TELEPHONE ENCOUNTER
Prior Authorization Approval    Authorization Effective Date: 8/18/2017  Authorization Expiration Date: 8/18/2018  Medication: Humira 40mg/0.8ml Syringe - PA Approved  Approved Dose/Quantity: 2 per 28 days  Reference #: [id number]   Insurance Company: [employer] - Phone [phone] Fax [phone]  Expected CoPay: $5.00      CoPay Card Available:      Foundation Assistance Needed:    Which Pharmacy is filling the prescription (Not needed for infusion/clinic administered): [facility] - [place], [place] - [facility] [address]  Pharmacy Notified: Yes  Patient Notified: Yes

## 2017-08-21 NOTE — TELEPHONE ENCOUNTER
[facility] Prior Authorization Team   Phone: [phone]  Fax: [phone]    PA Initiation    Medication: Humira 40mg/0.8ml Syringe - PA Initiated  Insurance Company: [employer] - Phone [phone] Fax [phone]  Pharmacy Filling the Rx: [facility] - [place], [place] - [facility] [address]  Filling Pharmacy Phone: [phone]  Filling Pharmacy Fax: [phone]  Start Date: 8/21/2017

## 2017-12-01 ENCOUNTER — OFFICE VISIT (OUTPATIENT)
Dept: DERMATOLOGY | Facility: CLINIC | Age: 42
End: 2017-12-01

## 2017-12-01 DIAGNOSIS — L40.9 PSORIASIS: ICD-10-CM

## 2017-12-01 DIAGNOSIS — Z79.899 ENCOUNTER FOR LONG-TERM (CURRENT) USE OF HIGH-RISK MEDICATION: Primary | ICD-10-CM

## 2017-12-01 DIAGNOSIS — Z79.899 ENCOUNTER FOR LONG-TERM (CURRENT) USE OF HIGH-RISK MEDICATION: ICD-10-CM

## 2017-12-01 LAB
ALT SERPL W P-5'-P-CCNC: 25 U/L (ref 0–50)
AST SERPL W P-5'-P-CCNC: 17 U/L (ref 0–45)
BASOPHILS # BLD AUTO: 0 10E9/L (ref 0–0.2)
BASOPHILS NFR BLD AUTO: 0.6 %
DIFFERENTIAL METHOD BLD: NORMAL
EOSINOPHIL # BLD AUTO: 0.2 10E9/L (ref 0–0.7)
EOSINOPHIL NFR BLD AUTO: 3 %
ERYTHROCYTE [DISTWIDTH] IN BLOOD BY AUTOMATED COUNT: 12.3 % (ref 10–15)
HCT VFR BLD AUTO: 37.6 % (ref 35–47)
HGB BLD-MCNC: 12.2 G/DL (ref 11.7–15.7)
IMM GRANULOCYTES # BLD: 0 10E9/L (ref 0–0.4)
IMM GRANULOCYTES NFR BLD: 0.2 %
LYMPHOCYTES # BLD AUTO: 2.2 10E9/L (ref 0.8–5.3)
LYMPHOCYTES NFR BLD AUTO: 33.5 %
MCH RBC QN AUTO: 28.2 PG (ref 26.5–33)
MCHC RBC AUTO-ENTMCNC: 32.4 G/DL (ref 31.5–36.5)
MCV RBC AUTO: 87 FL (ref 78–100)
MONOCYTES # BLD AUTO: 0.6 10E9/L (ref 0–1.3)
MONOCYTES NFR BLD AUTO: 8.7 %
NEUTROPHILS # BLD AUTO: 3.5 10E9/L (ref 1.6–8.3)
NEUTROPHILS NFR BLD AUTO: 54 %
NRBC # BLD AUTO: 0 10*3/UL
NRBC BLD AUTO-RTO: 0 /100
PLATELET # BLD AUTO: 392 10E9/L (ref 150–450)
RBC # BLD AUTO: 4.32 10E12/L (ref 3.8–5.2)
WBC # BLD AUTO: 6.4 10E9/L (ref 4–11)

## 2017-12-01 RX ORDER — HYDROCORTISONE 25 MG/G
OINTMENT TOPICAL
Qty: 30 G | Refills: 3 | Status: SHIPPED | OUTPATIENT
Start: 2017-12-01 | End: 2020-12-29

## 2017-12-01 ASSESSMENT — PAIN SCALES - GENERAL: PAINLEVEL: NO PAIN (0)

## 2017-12-01 NOTE — MR AVS SNAPSHOT
After Visit Summary   12/1/2017    Everette Mayen    MRN: [id number]           Patient Information     Date Of Birth          1975        Visit Information        Provider Department      12/1/2017 7:45 AM Moo Montes MD [facility] Dermatology        Today's Diagnoses     Encounter for long-term (current) use of high-risk medication    -  1    Psoriasis           Follow-ups after your visit        Who to contact     Please call your clinic at [phone] to:    Ask questions about your health    Make or cancel appointments    Discuss your medicines    Learn about your test results    Speak to your doctor   If you have compliments or concerns about an experience at your clinic, or if you wish to file a complaint, please contact [facility] Patient Relations at [phone] or email us at [email or web address]         Additional Information About Your Visit        MyChart Information     [employer] gives you secure access to your electronic health record. If you see a primary care provider, you can also send messages to your care team and make appointments. If you have questions, please call your primary care clinic.  If you do not have a primary care provider, please call [phone] and they will assist you.      G[employer] is an electronic gateway that provides easy, online access to your medical records. With [employer], you can request a clinic appointment, read your test results, renew a prescription or communicate with your care team.     To access your existing account, please contact your [facility] Clinic or call [phone] for assistance.        Care EveryWhere ID     This is your Care EveryWhere ID. This could be used by other organizations to access your [place] medical records  [id number]         Blood Pressure from Last 3 Encounters:   05/02/17 120/80   03/11/17 122/80   10/14/16 100/70    Weight from Last 3  Encounters:   05/02/17 84.4 kg (186 lb)   03/11/17 86.2 kg (190 lb)   10/14/16 84.4 kg (186 lb)                 Today's Medication Changes          These changes are accurate as of: 12/1/17 11:59 PM.  If you have any questions, ask your nurse or doctor.               Start taking these medicines.        Dose/Directions    hydrocortisone 2.5 % ointment   Used for:  Psoriasis, Encounter for long-term (current) use of high-risk medication   Started by:  Moo Montes MD        Apply twice daily to affected area in the groin when needed x 5 days   Quantity:  30 g   Refills:  3            Where to get your medicines      These medications were sent to [facility] - [place], [place] - [address] [place] [address]  [address] [facility] [address]    Hours:  Mon-Fri 8:30am-5:00pm Toll Free [phone] Phone:  [phone]     adalimumab 40 MG/0.8ML prefilled syringe kit    hydrocortisone 2.5 % ointment                Primary Care Provider Office Phone # Fax #    Corrie Radha Torres [phone] [address] [facility] [address]        Equal Access to Services     CANDELARIO UREÑA AH: Hadii cora ku hadasho Soomaali, waaxda luqadaha, qaybta kaalmada adeegyada, lucero burgess. So [facility] [phone].    ATENCIÓN: Si habla español, tiene a wall disposición servicios gratuitos de asistencia lingüística. Llame al [phone].    We comply with applicable federal civil rights laws and [place] laws. We do not discriminate on the basis of race, color, national origin, age, disability, sex, sexual orientation, or gender identity.            Thank you!     Thank you for choosing [facility] DERMATOLOGY  for your care. Our goal is always to provide you with excellent care. Hearing back from our patients is one way we can continue to improve our services. Please take a few minutes to complete the written survey that you may receive in the mail after  your visit with us. Thank you!             Your Updated Medication List - Protect others around you: Learn how to safely use, store and throw away your medicines at www.disposemymeds.org.          This list is accurate as of: 12/1/17 11:59 PM.  Always use your most recent med list.                   Brand Name Dispense Instructions for use Diagnosis    adalimumab 40 MG/0.8ML prefilled syringe kit    HUMIRA    2 each    Inject 0.8 mLs (40 mg) Subcutaneous every 14 days    Psoriasis, Encounter for long-term (current) use of high-risk medication       amLODIPine-benazepril 10-20 MG per capsule    LOTREL    90 capsule    Take 1 capsule by mouth daily    Benign essential hypertension       * clobetasol 0.05 % ointment    TEMOVATE     Apply topically 2 times daily as needed (Psoriasis)        * clobetasol 0.05 % external solution    TEMOVATE    50 mL    Apply to scalp daily as needed for psoriasis flare    Psoriasis vulgaris       hydrocortisone 2.5 % ointment     30 g    Apply twice daily to affected area in the groin when needed x 5 days    Psoriasis, Encounter for long-term (current) use of high-risk medication       lithium 300 MG capsule      take 1 a night        traZODone 50 MG tablet    DESYREL     Take 1/2 to 1 at night as needed for sleep        * Notice:  This list has 2 medication(s) that are the same as other medications prescribed for you. Read the directions carefully, and ask your doctor or other care provider to review them with you.

## 2017-12-01 NOTE — LETTER
"12/1/2017       RE: Everette Mayen  [address] [facility] [address]     Dear Colleague,    Thank you for referring your patient, Everette Mayen, to the [facility] DERMATOLOGY at [facility]. Please see a copy of my visit note below.    [facility] Dermatology Note      Dermatology Problem List:  1. Psoriasis: On Humira 40 mg [id number] and clobetasol spot treatment to scalp and elbows  - previous 80-90% TBSA involvement.  2. Seborrheic keratosis  3. Acrochordon    Encounter Date: Dec 2, 2016    CC:  Chief Complaint   Patient presents with     Derm Problem     Everette is here today for a psoriasis follow up. Everette notes\" my psoriasis is doing really good\"         History of Present Illness:  Ms. Everette Mayen is a 42 year old female who presents as a follow-up for psoriasis on Humira 40mg SC q2w. Denies any serious infections int he last 6 months, no joint pain, lower back pains. Has developed new plaques on the buttocks but no new lesions in the intertriginous areas. No TB exposures. She is doing well. Uses clobetasol 0.05% ointment when needed. Sometimes gets a little irritation on the vaginal vault after using products with her menstrual cycle which she treats with clobetasol when occurs; this itches.     No fevers, chills, weight loss.    She has no other skin concerns and otherwise feels well.    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     Family History:  There is no family history of skin cancer.    Medications:  Current Outpatient Prescriptions   Medication     adalimumab (HUMIRA) 40 MG/0.8ML prefilled syringe kit     clobetasol (TEMOVATE) 0.05 % external solution     amLODIPine-benazepril (LOTREL) 10-20 MG per capsule     traZODone (DESYREL) 50 MG tablet     lithium 300 MG capsule     clobetasol (TEMOVATE) 0.05 % ointment     No current facility-administered medications for this visit.        Allergies   Allergen Reactions     Lamotrigine      Other reaction(s): Rash     Sulfa Drugs Hives     Other reaction(s): Lupus Erythematous         Review of Systems:  -As per HPI    Physical exam:  Vitals: There were no vitals taken for this visit.  GEN: This is a well developed, well-nourished female in no acute distress, in a pleasant mood.    LYMPH: No cervical lymphadenopathy  SKIN:Focused exam, which includes the head/face, lips, eyelids, neck, both arms, lower leg   - no oral lesions  - scaly well-defined pink plaques on the elbows and inferior abdomen  - right inferior perineal area with a light pink thin well-defined plaque  -No other lesions of concern on areas examined.       Impression/Plan:  1. Psoriasis: Well-controlled with improvement in PASI > 90% on adalimumab. She is likely experiencing koebnerization of the perineal skin which should respond to HCT 2.5% ointment BID prn. She has not had side effects of therapy today. No increased respiratory infections, no lymphadenopathy. Stable.     Continue Humira 40 mg [id number].      Continue clobetasol 0.05% ointment as spot treatment to affected areas on body, clobetasol 0.05% to scalp daily as needed     AST/ALT and CBC  ]    Quant Gold/Hepatitis labs due in 6 months.       CC Dr. Corrie Torres on close of this encounter.  Follow-up in 6 months, earlier for new or changing lesions.     Staff Involved:  This  patient was seen and staffed with Dr. Montes,  Dermatology attending.     Tigist Salas MD  PGY4 Dermatology Resident  I was present during the key portions of the history and physical exam.  I agree with the treatment plan. RIANNA Montes MD

## 2017-12-01 NOTE — PROGRESS NOTES
"[facility] Dermatology Note      Dermatology Problem List:  1. Psoriasis: On Humira 40 mg [id number] and clobetasol spot treatment to scalp and elbows  - previous 80-90% TBSA involvement.  2. Seborrheic keratosis  3. Acrochordon    Encounter Date: Dec 2, 2016    CC:  Chief Complaint   Patient presents with     Derm Problem     Everette is here today for a psoriasis follow up. Everette notes\" my psoriasis is doing really good\"         History of Present Illness:  Ms. Everette Mayen is a 42 year old female who presents as a follow-up for psoriasis on Humira 40mg SC q2w. Denies any serious infections int he last 6 months, no joint pain, lower back pains. Has developed new plaques on the buttocks but no new lesions in the intertriginous areas. No TB exposures. She is doing well. Uses clobetasol 0.05% ointment when needed. Sometimes gets a little irritation on the vaginal vault after using products with her menstrual cycle which she treats with clobetasol when occurs; this itches.     No fevers, chills, weight loss.    She has no other skin concerns and otherwise feels well.    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     Family History:  There is no family history of skin cancer.    Medications:  Current Outpatient Prescriptions   Medication     adalimumab (HUMIRA) 40 MG/0.8ML prefilled syringe kit     clobetasol (TEMOVATE) " 0.05 % external solution     amLODIPine-benazepril (LOTREL) 10-20 MG per capsule     traZODone (DESYREL) 50 MG tablet     lithium 300 MG capsule     clobetasol (TEMOVATE) 0.05 % ointment     No current facility-administered medications for this visit.        Allergies   Allergen Reactions     Lamotrigine      Other reaction(s): Rash     Sulfa Drugs Hives     Other reaction(s): Lupus Erythematous         Review of Systems:  -As per HPI    Physical exam:  Vitals: There were no vitals taken for this visit.  GEN: This is a well developed, well-nourished female in no acute distress, in a pleasant mood.    LYMPH: No cervical lymphadenopathy  SKIN:Focused exam, which includes the head/face, lips, eyelids, neck, both arms, lower leg   - no oral lesions  - scaly well-defined pink plaques on the elbows and inferior abdomen  - right inferior perineal area with a light pink thin well-defined plaque  -No other lesions of concern on areas examined.       Impression/Plan:  1. Psoriasis: Well-controlled with improvement in PASI > 90% on adalimumab. She is likely experiencing koebnerization of the perineal skin which should respond to HCT 2.5% ointment BID prn. She has not had side effects of therapy today. No increased respiratory infections, no lymphadenopathy. Stable.     Continue Humira 40 mg [id number].      Continue clobetasol 0.05% ointment as spot treatment to affected areas on body, clobetasol 0.05% to scalp daily as needed     AST/ALT and CBC  ]    Quant Gold/Hepatitis labs due in 6 months.       CC Dr. Corrie Torres on close of this encounter.  Follow-up in 6 months, earlier for new or changing lesions.     Staff Involved:  This patient was seen and staffed with Dr. Montes,  Dermatology attending.     Tigist Salas MD  PGY4 Dermatology Resident  I was present during the key portions of the history and physical exam.  I agree with the treatment plan. RIANNA Montes MD

## 2017-12-01 NOTE — NURSING NOTE
Dermatology Rooming Note    Everette Mayen's goals for this visit include:   Chief Complaint   Patient presents with     Derm Problem     Psoriasis. Everette notes that she is doing well on the Ana M Howard CMA

## 2017-12-18 NOTE — PROGRESS NOTES
Good news - your labs look great. Continue with treatment as discussed at your visit. (via [employer])

## 2018-01-20 ENCOUNTER — OFFICE VISIT (OUTPATIENT)
Dept: FAMILY MEDICINE | Facility: CLINIC | Age: 43
End: 2018-01-20
Payer: COMMERCIAL

## 2018-01-20 VITALS
TEMPERATURE: 98.2 F | WEIGHT: 188 LBS | DIASTOLIC BLOOD PRESSURE: 68 MMHG | HEART RATE: 85 BPM | SYSTOLIC BLOOD PRESSURE: 116 MMHG | BODY MASS INDEX: 34.6 KG/M2 | OXYGEN SATURATION: 96 % | HEIGHT: 62 IN | RESPIRATION RATE: 14 BRPM

## 2018-01-20 DIAGNOSIS — I10 BENIGN ESSENTIAL HYPERTENSION: ICD-10-CM

## 2018-01-20 DIAGNOSIS — Z23 NEED FOR VACCINATION: ICD-10-CM

## 2018-01-20 DIAGNOSIS — F31.76 BIPOLAR DISORDER, IN FULL REMISSION, MOST RECENT EPISODE DEPRESSED (H): Primary | ICD-10-CM

## 2018-01-20 LAB
ERYTHROCYTE [DISTWIDTH] IN BLOOD BY AUTOMATED COUNT: 13.8 % (ref 10–15)
HCT VFR BLD AUTO: 39 % (ref 35–47)
HGB BLD-MCNC: 12.7 G/DL (ref 11.7–15.7)
LITHIUM SERPL-SCNC: <0.2 MMOL/L (ref 0.6–1.2)
MCH RBC QN AUTO: 26.6 PG (ref 26.5–33)
MCHC RBC AUTO-ENTMCNC: 32.6 G/DL (ref 31.5–36.5)
MCV RBC AUTO: 82 FL (ref 78–100)
PLATELET # BLD AUTO: 367 10E9/L (ref 150–450)
RBC # BLD AUTO: 4.77 10E12/L (ref 3.8–5.2)
WBC # BLD AUTO: 5.8 10E9/L (ref 4–11)

## 2018-01-20 PROCEDURE — 80048 BASIC METABOLIC PNL TOTAL CA: CPT | Performed by: PHYSICIAN ASSISTANT

## 2018-01-20 PROCEDURE — 90714 TD VACC NO PRESV 7 YRS+ IM: CPT | Performed by: PHYSICIAN ASSISTANT

## 2018-01-20 PROCEDURE — 85027 COMPLETE CBC AUTOMATED: CPT | Performed by: PHYSICIAN ASSISTANT

## 2018-01-20 PROCEDURE — 90471 IMMUNIZATION ADMIN: CPT | Performed by: PHYSICIAN ASSISTANT

## 2018-01-20 PROCEDURE — 84443 ASSAY THYROID STIM HORMONE: CPT | Performed by: PHYSICIAN ASSISTANT

## 2018-01-20 PROCEDURE — 99214 OFFICE O/P EST MOD 30 MIN: CPT | Mod: 25 | Performed by: PHYSICIAN ASSISTANT

## 2018-01-20 PROCEDURE — 36415 COLL VENOUS BLD VENIPUNCTURE: CPT | Performed by: PHYSICIAN ASSISTANT

## 2018-01-20 PROCEDURE — 80178 ASSAY OF LITHIUM: CPT | Performed by: PHYSICIAN ASSISTANT

## 2018-01-20 RX ORDER — AMLODIPINE AND BENAZEPRIL HYDROCHLORIDE 10; 20 MG/1; MG/1
1 CAPSULE ORAL DAILY
Qty: 90 CAPSULE | Refills: 3 | Status: SHIPPED | OUTPATIENT
Start: 2018-01-20 | End: 2019-01-14

## 2018-01-20 ASSESSMENT — PATIENT HEALTH QUESTIONNAIRE - PHQ9
10. IF YOU CHECKED OFF ANY PROBLEMS, HOW DIFFICULT HAVE THESE PROBLEMS MADE IT FOR YOU TO DO YOUR WORK, TAKE CARE OF THINGS AT HOME, OR GET ALONG WITH OTHER PEOPLE: SOMEWHAT DIFFICULT
SUM OF ALL RESPONSES TO PHQ QUESTIONS 1-9: 4
SUM OF ALL RESPONSES TO PHQ QUESTIONS 1-9: 4

## 2018-01-20 NOTE — PROGRESS NOTES
SUBJECTIVE:   Everette Mayen is a 42 year old female who presents to clinic today for the following health issues:        Hypertension Follow-up      Outpatient blood pressures are not being checked.    Low Salt Diet: no added salt    Depression and Anxiety Follow-Up    Status since last visit: Stable    Other associated symptoms:None    Complicating factors:     Significant life event: No     Current substance abuse: None    PHQ-9 Score and MyChart F/U Questions 10/14/2016 2017 2018   Total Score 0 1 4   Q9: Suicide Ideation Not at all Not at all Not at all     AVNI-7 SCORE 5/3/2016 2016   Total Score 3 (minimal anxiety) 3 (minimal anxiety)     Answers for HPI/ROS submitted by the patient on 2018   If you checked off any problems, how difficult have these problems made it for you to do your work, take care of things at home, or get along with other people?: Somewhat difficult  PHQ9 TOTAL SCORE: 4    PHQ-9  English  PHQ-9   Any Language  GAD7  Suicide Assessment Five-step Evaluation and Treatment (SAFE-T)        Amount of exercise or physical activity: None    Problems taking medications regularly: No    Medication side effects: none  Diet: low salt      HPI additional notes:   Chief Complaint   Patient presents with     Depression     Recheck     Anxiety     Hypertension     Everette presents today to establish care. Has been seeing [facility], but looking to establish with different PCP. Needs refills on HTN medication; denies issues with SE, does not monitor BP at home. Denies frequent HA, CP, SOB, focal numbness or weakness, visual disturbance.    Patient also hoping PCP can manage psych meds. Developed severe depression during pregnancy in , son found to have microcephaly on routine US (son now 6 y/o, microcephaly and special needs). Managed by Dr Eli Kaur at [facility] (specialized in  and postpartum depression). Mood disorder resolved shortly after birth of her son. In , had crisis  "again; unable to get into psychiatrist for 2 months, so saw Dr Kaur again, dx bipolar depression. Started on lithium and trazodone. Sx entered remission and has been on stable doses since. Reports she did titrate up to 600 mg lithium qHS due to low serum lithium level, however developed severe nausea. Went back to 300 mg qHS, SE resolved, and mood sx remained in remission despite low serum lithium level. Uses trazodone 25-50 mg qHS prn insomnia, uses several times per week but doesn't need nightly.     ROS:    A Comprehensive greater than 10 system review of systems was carried out.    Pertinent positives and negatives are noted above.  Otherwise negative for contributory information.      Chart Review:  History   Smoking Status     Never Smoker   Smokeless Tobacco     Never Used       Patient Active Problem List   Diagnosis      PSORIASIS currently on Humira     Allergic rhinitis     Vitamin D deficiency     Peptic Acid Gastritis     Situational depression     Health Care Home     Benign essential hypertension     Non morbid obesity due to excess calories     Gastritis without bleeding, unspecified chronicity, unspecified gastritis type     Acute left-sided low back pain without sciatica since 10-15     Bipolar disorder, in full remission, most recent episode depressed (H)     Past Surgical History:   Procedure Laterality Date     NO HISTORY OF SURGERY       Problem list, Medication list, Allergies, Medical/Social/Surg hx reviewed in [employer], updated as appropriate.   OBJECTIVE:                                                    /68 (BP Location: Left arm, Patient Position: Sitting, Cuff Size: Adult Regular)  Pulse 85  Temp 98.2  F (36.8  C) (Tympanic)  Resp 14  Ht 5' 2\" (1.575 m)  Wt 188 lb (85.3 kg)  LMP 12/24/2017  SpO2 96%  BMI 34.39 kg/m2  Body mass index is 34.39 kg/(m^2).  GENERAL:  WDWN, no acute distress  PSYCH: pleasant, cooperative  EYES: no discharge, no injection  HENT:  Normocephalic. Moist " mucus membranes.  NECK:  Supple, symmetric  EXTREMITIES:  No gross deformities, moves all 4 limbs spontaneously  SKIN:  Warm and dry, no rash or suspicious lesions    NEUROLOGIC: alert, sensation grossly intact.    Diagnostic test results: none     ASSESSMENT/PLAN:                                                          ICD-10-CM    1. Bipolar disorder, in full remission, most recent episode depressed (H) F31.76 CBC with platelets     Basic metabolic panel     Lithium level     TSH with free T4 reflex   2. Benign essential hypertension I10 Basic metabolic panel     amLODIPine-benazepril (LOTREL) 10-20 MG per capsule   3. Need for vaccination Z23 TD (ADULT, 7+) PRESERVE FREE     Given dx in remission with stable doses of medications, ok to manage psychiatry medications; does not need refills at this time, will contact clinic when she is running low. Will update routine labs, as well as check lithium level. Agree with Dr Kaur: would continue on current dose lithium given sx in remission, even if serum lithium low. No s/s lithium toxicity.    HTN well-controlled on current medication. Patient is tolerating current medication without any major side effects of concerns and current dose seems reasonable too.  Current medication regime is effective. Continue current treatment without any changes. Lotrel refilled as above.    Td updated today; declines flu shot.    Please see patient instructions for treatment details.  30 minutes total spent on this encounter, >50% spent in direct communication with the patient.    Follow up office visit in 3-6 months for medication check and labs, sooner PRN.    Shelby Agee PA-C  [facility]

## 2018-01-20 NOTE — MR AVS SNAPSHOT
After Visit Summary   1/20/2018    Everette Mayen    MRN: [id number]           Patient Information     Date Of Birth          1975        Visit Information        Provider Department      1/20/2018 8:00 AM Shelby Agee PA-C [facility]        Today's Diagnoses     Bipolar disorder, in full remission, most recent episode depressed (H)    -  1    Benign essential hypertension        Need for vaccination          Care Instructions    Minimum 5907-6523 calories per day. Maximum 2628-5042 calories per day.    [email or web address]                Follow-ups after your visit        Who to contact     If you have questions or need follow up information about today's clinic visit or your schedule please contact [facility] directly at [phone].  Normal or non-critical lab and imaging results will be communicated to you by MyChart, letter or phone within 4 business days after the clinic has received the results. If you do not hear from us within 7 days, please contact the clinic through [employer] or phone. If you have a critical or abnormal lab result, we will notify you by phone as soon as possible.  Submit refill requests through [employer] or call your pharmacy and they will forward the refill request to us. Please allow 3 business days for your refill to be completed.          Additional Information About Your Visit        MyChart Information     [employer] gives you secure access to your electronic health record. If you see a primary care provider, you can also send messages to your care team and make appointments. If you have questions, please call your primary care clinic.  If you do not have a primary care provider, please call [phone] and they will assist you.        Care EveryWhere ID     This is your Care EveryWhere ID. This could be used by other organizations to access your [facility] medical records  [id number]        Your Vitals  "Were     Pulse Temperature Respirations Height Last Period Pulse Oximetry    85 98.2  F (36.8  C) (Tympanic) 14 5' 2\" (1.575 m) 12/24/2017 96%    BMI (Body Mass Index)                   34.39 kg/m2            Blood Pressure from Last 3 Encounters:   01/20/18 116/68   05/02/17 120/80   03/11/17 122/80    Weight from Last 3 Encounters:   01/20/18 188 lb (85.3 kg)   05/02/17 186 lb (84.4 kg)   03/11/17 190 lb (86.2 kg)              We Performed the Following     Basic metabolic panel     CBC with platelets     Lithium level     TD (ADULT, 7+) PRESERVE FREE     TSH with free T4 reflex          Where to get your medicines      These medications were sent to [facility] [address] [place] [address] [address] & [facility]  [address] [place] [address] [facility] [address]    Hours:  24-hours Phone:  [phone]     amLODIPine-benazepril 10-20 MG per capsule          Primary Care Provider Office Phone # Fax #    Corrie Torres [phone] [address] [facility] [address]        Equal Access to Services     ISAIAH UREÑA AH: Hadii aad ku hadasho Soomaali, waaxda luqadaha, qaybta kaalmada adeegyada, waxay stephaniein heidi burgess. So [facility] [phone].    ATENCIÓN: Si habla español, tiene a wall disposición servicios gratuitos de asistencia lingüística. ame al [phone].    We comply with applicable federal civil rights laws and [place] laws. We do not discriminate on the basis of race, color, national origin, age, disability, sex, sexual orientation, or gender identity.            Thank you!     [facility] you [facility]. Our goal is always to provide you with excellent care. Hearing back from our patients is one way we can continue to improve our services. Please take a few minutes to complete the written survey that you may receive in the mail after your visit with us. Thank you!             Your Updated " Medication List - Protect others around you: Learn how to safely use, store and throw away your medicines at www.disposemymeds.org.          This list is accurate as of: 1/20/18  8:26 AM.  Always use your most recent med list.                   Brand Name Dispense Instructions for use Diagnosis    adalimumab 40 MG/0.8ML prefilled syringe kit    HUMIRA    2 each    Inject 0.8 mLs (40 mg) Subcutaneous every 14 days    Psoriasis, Encounter for long-term (current) use of high-risk medication       amLODIPine-benazepril 10-20 MG per capsule    LOTREL    90 capsule    Take 1 capsule by mouth daily    Benign essential hypertension       * clobetasol 0.05 % ointment    TEMOVATE     Apply topically 2 times daily as needed (Psoriasis)        * clobetasol 0.05 % external solution    TEMOVATE    50 mL    Apply to scalp daily as needed for psoriasis flare    Psoriasis vulgaris       hydrocortisone 2.5 % ointment     30 g    Apply twice daily to affected area in the groin when needed x 5 days    Psoriasis, Encounter for long-term (current) use of high-risk medication       lithium 300 MG capsule      take 1 a night        traZODone 50 MG tablet    DESYREL     Take 1/2 to 1 at night as needed for sleep        * Notice:  This list has 2 medication(s) that are the same as other medications prescribed for you. Read the directions carefully, and ask your doctor or other care provider to review them with you.

## 2018-01-20 NOTE — NURSING NOTE
"Chief Complaint   Patient presents with     Depression     Recheck     Anxiety     Hypertension       Initial /68 (BP Location: Left arm, Patient Position: Sitting, Cuff Size: Adult Regular)  Pulse 85  Temp 98.2  F (36.8  C) (Tympanic)  Resp 14  Ht 5' 2\" (1.575 m)  Wt 188 lb (85.3 kg)  LMP 12/24/2017  SpO2 96%  BMI 34.39 kg/m2 Estimated body mass index is 34.39 kg/(m^2) as calculated from the following:    Height as of this encounter: 5' 2\" (1.575 m).    Weight as of this encounter: 188 lb (85.3 kg).  Medication Reconciliation: complete     Paris Lopez LPN    Screening Questionnaire for Adult Immunization    Are you sick today?   No   Do you have allergies to medications, food, a vaccine component or latex?   No   Have you ever had a serious reaction after receiving a vaccination?   No   Do you have a long-term health problem with heart disease, lung disease, asthma, kidney disease, metabolic disease (e.g. diabetes), anemia, or other blood disorder?   No   Do you have cancer, leukemia, HIV/AIDS, or any other immune system problem?   No   In the past 3 months, have you taken medications that affect  your immune system, such as prednisone, other steroids, or anticancer drugs; drugs for the treatment of rheumatoid arthritis, Crohn s disease, or psoriasis; or have you had radiation treatments?   No   Have you had a seizure, or a brain or other nervous system problem?   No   During the past year, have you received a transfusion of blood or blood     products, or been given immune (gamma) globulin or antiviral drug?   No   For women: Are you pregnant or is there a chance you could become        pregnant during the next month?   No   Have you received any vaccinations in the past 4 weeks?   No     Immunization questionnaire answers were all negative.        Per orders of Deepti Barba, injection of TD was given by Paris Gonzalez. Patient instructed to remain in clinic for 15 minutes afterwards, and to " report any adverse reaction to me immediately.       Screening performed by Paris Gonzalez on 1/20/2018 at 8:38 AM.

## 2018-01-21 LAB
ANION GAP SERPL CALCULATED.3IONS-SCNC: 7 MMOL/L (ref 3–14)
BUN SERPL-MCNC: 12 MG/DL (ref 7–30)
CALCIUM SERPL-MCNC: 8.1 MG/DL (ref 8.5–10.1)
CHLORIDE SERPL-SCNC: 107 MMOL/L (ref 94–109)
CO2 SERPL-SCNC: 22 MMOL/L (ref 20–32)
CREAT SERPL-MCNC: 0.51 MG/DL (ref 0.52–1.04)
GFR SERPL CREATININE-BSD FRML MDRD: >90 ML/MIN/1.7M2
GLUCOSE SERPL-MCNC: 82 MG/DL (ref 70–99)
POTASSIUM SERPL-SCNC: 3.8 MMOL/L (ref 3.4–5.3)
SODIUM SERPL-SCNC: 136 MMOL/L (ref 133–144)
TSH SERPL DL<=0.005 MIU/L-ACNC: 2.06 MU/L (ref 0.4–4)

## 2018-01-22 NOTE — PROGRESS NOTES
Carson Gaviria,    I just wanted to let you know that your lab results have been reviewed and are attached.    - Your lithium level is low, however since your mood symptoms are well-controlled we will continue your current dose, as we discussed.  - Your metabolic panel shows:  normal electrolytes (sodium, potassium, calcium) normal kidney function (creatinine and GFR)  - Your TSH, a screening test for thyroid disease, was normal.  - Your Blood Count Results show normal White Blood Cell count (no sign of infection), normal Hemoglobin (not anemia), and normal Platelets (affects clotting).    Please let me know if you have any questions.    Sincerely,    Shelby Agee PA-C    [place] [id number] [address] [phone] (p)

## 2018-01-23 ASSESSMENT — PATIENT HEALTH QUESTIONNAIRE - PHQ9: SUM OF ALL RESPONSES TO PHQ QUESTIONS 1-9: 4

## 2018-04-24 ENCOUNTER — OFFICE VISIT (OUTPATIENT)
Dept: FAMILY MEDICINE | Facility: CLINIC | Age: 43
End: 2018-04-24
Payer: COMMERCIAL

## 2018-04-24 VITALS
RESPIRATION RATE: 16 BRPM | TEMPERATURE: 101 F | DIASTOLIC BLOOD PRESSURE: 78 MMHG | OXYGEN SATURATION: 95 % | BODY MASS INDEX: 34.02 KG/M2 | WEIGHT: 186 LBS | HEART RATE: 115 BPM | SYSTOLIC BLOOD PRESSURE: 124 MMHG

## 2018-04-24 DIAGNOSIS — R05.9 COUGH: ICD-10-CM

## 2018-04-24 DIAGNOSIS — R50.9 FEVER, UNSPECIFIED FEVER CAUSE: ICD-10-CM

## 2018-04-24 DIAGNOSIS — J10.1 INFLUENZA A: Primary | ICD-10-CM

## 2018-04-24 LAB
FLUAV+FLUBV AG SPEC QL: NEGATIVE
FLUAV+FLUBV AG SPEC QL: POSITIVE
SPECIMEN SOURCE: ABNORMAL

## 2018-04-24 PROCEDURE — 87880 STREP A ASSAY W/OPTIC: CPT | Performed by: PHYSICIAN ASSISTANT

## 2018-04-24 PROCEDURE — 87081 CULTURE SCREEN ONLY: CPT | Performed by: PHYSICIAN ASSISTANT

## 2018-04-24 PROCEDURE — 99213 OFFICE O/P EST LOW 20 MIN: CPT | Performed by: PHYSICIAN ASSISTANT

## 2018-04-24 PROCEDURE — 87804 INFLUENZA ASSAY W/OPTIC: CPT | Performed by: PHYSICIAN ASSISTANT

## 2018-04-24 RX ORDER — OSELTAMIVIR PHOSPHATE 75 MG/1
75 CAPSULE ORAL 2 TIMES DAILY
Qty: 10 CAPSULE | Refills: 0 | Status: SHIPPED | OUTPATIENT
Start: 2018-04-24 | End: 2019-01-11

## 2018-04-24 RX ORDER — PENICILLIN V POTASSIUM 500 MG/1
500 TABLET, FILM COATED ORAL 2 TIMES DAILY
Qty: 20 TABLET | Refills: 0 | Status: CANCELLED | OUTPATIENT
Start: 2018-04-24

## 2018-04-24 NOTE — PATIENT INSTRUCTIONS
Influenza (Adult)    Influenza is also called the flu. It is a viral illness that affects the air passages of your lungs. It is different from the common cold. The flu can easily be passed from one to person to another. It may be spread through the air by coughing and sneezing. Or it can be spread by touching the sick person and then touching your own eyes, nose, or mouth.  The flu starts 1 to 3 days after you are exposed to the flu virus. It may last for 1 to 2 weeks but many people feel tired or fatigued for many weeks afterward. You usually don t need to take antibiotics unless you have a complication. This might be an ear or sinus infection or pneumonia.  Symptoms of the flu may be mild or severe. They can include extreme tiredness (wanting to stay in bed all day), chills, fevers, muscle aches, soreness with eye movement, headache, and a dry, hacking cough.  Home care  Follow these guidelines when caring for yourself at home:    Avoid being around cigarette smoke, whether yours or other people s.    Acetaminophen or ibuprofen will help ease your fever, muscle aches, and headache. Don t give aspirin to anyone younger than 18 who has the flu. Aspirin can harm the liver.    Nausea and loss of appetite are common with the flu. Eat light meals. Drink 6 to 8 glasses of liquids every day. Good choices are water, sport drinks, soft drinks without caffeine, juices, tea, and soup. Extra fluids will also help loosen secretions in your nose and lungs.    Over-the-counter cold medicines will not make the flu go away faster. But the medicines may help with coughing, sore throat, and congestion in your nose and sinuses. Don t use a decongestant if you have high blood pressure.    Stay home until your fever has been gone for at least 24 hours without using medicine to reduce fever.  Follow-up care  Follow up with your healthcare provider, or as advised, if you are not getting better over the next week.  If you are age 65 or  older, talk with your provider about getting a pneumococcal vaccine every 5 years. You should also get this vaccine if you have chronic asthma or COPD. All adults should get a flu vaccine every fall. Ask your provider about this.  When to seek medical advice  Call your healthcare provider right away if any of these occur:    Cough with lots of colored mucus (sputum) or blood in your mucus    Chest pain, shortness of breath, wheezing, or trouble breathing    Severe headache, or face, neck, or ear pain    New rash with fever    Fever of 100.4 F (38 C) or higher, or as directed by your healthcare provider    Confusion, behavior change, or seizure    Severe weakness or dizziness    You get a new fever or cough after getting better for a few days  Date Last Reviewed: 1/1/2017 2000-2017 [employer]. [address] [place] [address], [place] [address]. All rights reserved. This information is not intended as a substitute for professional medical care. Always follow your healthcare professional's instructions.

## 2018-04-24 NOTE — MR AVS SNAPSHOT
After Visit Summary   4/24/2018    Everette Mayen    MRN: [id number]           Patient Information     Date Of Birth          1975        Visit Information        Provider Department      4/24/2018 7:50 AM Katelynn Schwarzt PA-C [facility]        Today's Diagnoses     Influenza A    -  1    Fever, unspecified fever cause        Cough          Care Instructions      Influenza (Adult)    Influenza is also called the flu. It is a viral illness that affects the air passages of your lungs. It is different from the common cold. The flu can easily be passed from one to person to another. It may be spread through the air by coughing and sneezing. Or it can be spread by touching the sick person and then touching your own eyes, nose, or mouth.  The flu starts 1 to 3 days after you are exposed to the flu virus. It may last for 1 to 2 weeks but many people feel tired or fatigued for many weeks afterward. You usually don t need to take antibiotics unless you have a complication. This might be an ear or sinus infection or pneumonia.  Symptoms of the flu may be mild or severe. They can include extreme tiredness (wanting to stay in bed all day), chills, fevers, muscle aches, soreness with eye movement, headache, and a dry, hacking cough.  Home care  Follow these guidelines when caring for yourself at home:    Avoid being around cigarette smoke, whether yours or other people s.    Acetaminophen or ibuprofen will help ease your fever, muscle aches, and headache. Don t give aspirin to anyone younger than 18 who has the flu. Aspirin can harm the liver.    Nausea and loss of appetite are common with the flu. Eat light meals. Drink 6 to 8 glasses of liquids every day. Good choices are water, sport drinks, soft drinks without caffeine, juices, tea, and soup. Extra fluids will also help loosen secretions in your nose and lungs.    Over-the-counter cold medicines will not  make the flu go away faster. But the medicines may help with coughing, sore throat, and congestion in your nose and sinuses. Don t use a decongestant if you have high blood pressure.    Stay home until your fever has been gone for at least 24 hours without using medicine to reduce fever.  Follow-up care  Follow up with your healthcare provider, or as advised, if you are not getting better over the next week.  If you are age 65 or older, talk with your provider about getting a pneumococcal vaccine every 5 years. You should also get this vaccine if you have chronic asthma or COPD. All adults should get a flu vaccine every fall. Ask your provider about this.  When to seek medical advice  Call your healthcare provider right away if any of these occur:    Cough with lots of colored mucus (sputum) or blood in your mucus    Chest pain, shortness of breath, wheezing, or trouble breathing    Severe headache, or face, neck, or ear pain    New rash with fever    Fever of 100.4 F (38 C) or higher, or as directed by your healthcare provider    Confusion, behavior change, or seizure    Severe weakness or dizziness    You get a new fever or cough after getting better for a few days  Date Last Reviewed: 1/1/2017 2000-2017 [employer]. [address] [place] [address]. All rights reserved. This information is not intended as a substitute for professional medical care. Always follow your healthcare professional's instructions.                Follow-ups after your visit        Who to contact     If you have questions or need follow up information about today's clinic visit or your schedule please contact [facility] directly at [phone].  Normal or non-critical lab and imaging results will be communicated to you by MyChart, letter or phone within 4 business days after the clinic has received the results. If you do not hear from us within 7 days, please contact the clinic  through [employer] or phone. If you have a critical or abnormal lab result, we will notify you by phone as soon as possible.  Submit refill requests through [employer] or call your pharmacy and they will forward the refill request to us. Please allow 3 business days for your refill to be completed.          Additional Information About Your Visit        [employer] Information     [employer] gives you secure access to your electronic health record. If you see a primary care provider, you can also send messages to your care team and make appointments. If you have questions, please call your primary care clinic.  If you do not have a primary care provider, please call [phone] and they will assist you.        Care EveryWhere ID     This is your Care EveryWhere ID. This could be used by other organizations to access your [facility] medical records  [id number]        Your Vitals Were     Pulse Temperature Respirations Pulse Oximetry BMI (Body Mass Index)       115 101  F (38.3  C) (Tympanic) 16 95% 34.02 kg/m2        Blood Pressure from Last 3 Encounters:   04/24/18 124/78   01/20/18 116/68   05/02/17 120/80    Weight from Last 3 Encounters:   04/24/18 186 lb (84.4 kg)   01/20/18 188 lb (85.3 kg)   05/02/17 186 lb (84.4 kg)              We Performed the Following     Influenza A/B antigen     Strep, Rapid Screen          Today's Medication Changes          These changes are accurate as of 4/24/18  8:29 AM.  If you have any questions, ask your nurse or doctor.               Start taking these medicines.        Dose/Directions    oseltamivir 75 MG capsule   Commonly known as:  TAMIFLU   Used for:  Influenza A   Started by:  Katelynn Schwartz PA-C        Dose:  75 mg   Take 1 capsule (75 mg) by mouth 2 times daily   Quantity:  10 capsule   Refills:  0            Where to get your medicines      These medications were sent to [facility] [address] [place] [address] [address] [place] [address] & [address]  [place] [address]    Hours:  24-hours Phone:  [phone]     oseltamivir 75 MG capsule                Primary Care Provider Office Phone # Fax #    Shelby Agee PA-C [phone]       [address]  [facility] [address]        Equal Access to Services     [facility]: Hadii aad ku hadasho Soomaali, waaxda luqadaha, qaybta kaalmada adeegyada, waxay idiin hayaan adeeg kharash la'aan . So [facility] [phone].    ATENCIÓN: Si habla español, tiene a wall disposición servicios gratuitos de asistencia lingüística. Nasreen al [phone].    We comply with applicable federal civil rights laws and [place] laws. We do not discriminate on the basis of race, color, national origin, age, disability, sex, sexual orientation, or gender identity.            Thank you!     Thank you for choosing [facility]  for your care. Our goal is always to provide you with excellent care. Hearing back from our patients is one way we can continue to improve our services. Please take a few minutes to complete the written survey that you may receive in the mail after your visit with us. Thank you!             Your Updated Medication List - Protect others around you: Learn how to safely use, store and throw away your medicines at www.disposemymeds.org.          This list is accurate as of 4/24/18  8:29 AM.  Always use your most recent med list.                   Brand Name Dispense Instructions for use Diagnosis    adalimumab 40 MG/0.8ML prefilled syringe kit    HUMIRA    2 each    Inject 0.8 mLs (40 mg) Subcutaneous every 14 days    Psoriasis, Encounter for long-term (current) use of high-risk medication       amLODIPine-benazepril 10-20 MG per capsule    LOTREL    90 capsule    Take 1 capsule by mouth daily    Benign essential hypertension       * clobetasol 0.05 % ointment    TEMOVATE     Apply topically 2 times daily as needed (Psoriasis)        * clobetasol 0.05 % external  solution    TEMOVATE    50 mL    Apply to scalp daily as needed for psoriasis flare    Psoriasis vulgaris       hydrocortisone 2.5 % ointment     30 g    Apply twice daily to affected area in the groin when needed x 5 days    Psoriasis, Encounter for long-term (current) use of high-risk medication       lithium 300 MG capsule      take 1 a night        oseltamivir 75 MG capsule    TAMIFLU    10 capsule    Take 1 capsule (75 mg) by mouth 2 times daily    Influenza A       traZODone 50 MG tablet    DESYREL     Take 1/2 to 1 at night as needed for sleep        * Notice:  This list has 2 medication(s) that are the same as other medications prescribed for you. Read the directions carefully, and ask your doctor or other care provider to review them with you.

## 2018-04-24 NOTE — PROGRESS NOTES
SUBJECTIVE:   Everette Mayen is a 42 year old female who presents to clinic today for the following health issues:    ENT Symptoms             Symptoms: cc Present Absent Comment   Fever/Chills  x     Fatigue  x     Muscle Aches   x    Eye Irritation   x    Sneezing  x     Nasal Serg/Drg  x     Sinus Pressure/Pain   x    Loss of smell   x    Dental pain   x    Sore Throat  x     Swollen Glands  x     Ear Pain/Fullness   x    Cough  x     Wheeze   x    Chest Pain   x    Shortness of breath   x    Rash   x    Other  x  Vomiting from coughing so hard     Symptom duration:  2 days   Symptom severity:  moderate   Treatments tried:  cough medication, tylenol cold   Contacts:  n/a     Reviewed and updated as needed this visit by clinical staff  Tobacco  Allergies  Meds  Problems  Med Hx  Surg Hx  Fam Hx  Soc Hx        Reviewed and updated as needed this visit by Provider  Tobacco  Allergies  Meds  Problems  Med Hx  Surg Hx  Fam Hx  Soc Hx        Additional complaints: None    HPI additional notes: Everette presents today with   Chief Complaint   Patient presents with     URI   Pt is on humira for psoriasis. No aches or rashes.         ROS:  C: POSITIVE for fever and chills.  Skin: Negative for worrisome rashes or lesions  ENT/MOUTH:POSITIVE for congestion and runny nose.  Negative for ear pain and sinus pressure.  Resp: POSITIVE for cough occasionally productive without  SOB and wheezing  MS: POSITIVE for generalized fatigue and malaise.  GI: Positive for vomiting with cough.  NEURO: Negative  for headaches or dizziness.  P: Negative for changes in mood or affect  ROS otherwise negative.    Chart Review:  History   Smoking Status     Never Smoker   Smokeless Tobacco     Never Used     Patient Active Problem List   Diagnosis      PSORIASIS currently on Humira     Allergic rhinitis     Vitamin D deficiency     Situational depression     Health Care Home     Benign essential hypertension     Non morbid  obesity due to excess calories     Gastritis without bleeding, unspecified chronicity, unspecified gastritis type     Acute left-sided low back pain without sciatica since 10-15     Bipolar disorder, in full remission, most recent episode depressed (H)     History reviewed. No pertinent surgical history.  Problem list, Medication list, Allergies, Medical/Social/Surg hx reviewed in [facility], updated as appropriate.   OBJECTIVE:                                                    /78  Pulse 115  Temp 101  F (38.3  C) (Tympanic)  Resp 16  Wt 186 lb (84.4 kg)  SpO2 95%  BMI 34.02 kg/m2  Body mass index is 34.02 kg/(m^2).  GENERAL: healthy, alert, in no acute distress  EYES: Grossly normal to inspection, EOMI, PERRL  HENT: Ear canals normal bilaterally. TM dull gray  bulging with serous effusion bilaterally.  Nasal mucosa mildly edematous with clear rhinorrhea.  Mouth- mucous membranes moist, no lesions or ulcerations. Pharynx erythematous without petechia. and No tonsillary hypertrophy. Uvula midline, no  post-nasal drainage.  Maxillary and frontal sinuses nontender to palpation bilaterally  NECK:2+ anterior superficial cervical LAD bilaterally  RESP: lungs clear to auscultation - no rales, no rhonchi, no wheezes  CV: regular rate and rhythm, normal S1 S2.  No peripheral edema.  SKIN: no suspicious lesions, no rashes  PSYCH: Alert and oriented times 3;  Able to articulate logical thoughts. Affect is normal.    Diagnostic test results: Influenza A positive, strep negative.     ASSESSMENT/PLAN:                                                          ICD-10-CM    1. Influenza A J10.1 oseltamivir (TAMIFLU) 75 MG capsule   2. Fever, unspecified fever cause R50.9    3. Cough R05 Influenza A/B antigen     Strep, Rapid Screen     Will start on tamiflu as pt has been symptomatic for two days.  Also put in prophylactic medication for her son who is disabled.  Discussed OTC medications.  Pt declines zofran for  nausea.    Please see patient instructions for treatment details.    Follow up in 7-10 days if not improving as anticipated.    Katelynn Schwartz PA-C  [facility]

## 2018-04-24 NOTE — NURSING NOTE
"Chief Complaint   Patient presents with     URI       Initial /78  Pulse 115  Temp 101  F (38.3  C) (Tympanic)  Resp 16  Wt 186 lb (84.4 kg)  SpO2 95%  BMI 34.02 kg/m2 Estimated body mass index is 34.02 kg/(m^2) as calculated from the following:    Height as of 1/20/18: 5' 2\" (1.575 m).    Weight as of this encounter: 186 lb (84.4 kg).  Medication Reconciliation: complete    "

## 2018-04-25 LAB
BACTERIA SPEC CULT: NORMAL
DEPRECATED S PYO AG THROAT QL EIA: NORMAL
SPECIMEN SOURCE: NORMAL
SPECIMEN SOURCE: NORMAL

## 2018-04-25 NOTE — PROGRESS NOTES
Carson Gaviria,    I just wanted to let you know that your lab results have been reviewed and are attached.    - Your throat culture was negative for strep.    Please let me know if you have any questions and have a great week!    Sincerely,    Qiana Schwartz PA-C    [facility]  [address]  [place] [address]  [phone] (p)

## 2018-07-29 DIAGNOSIS — F31.76 BIPOLAR DISORDER, IN FULL REMISSION, MOST RECENT EPISODE DEPRESSED (H): ICD-10-CM

## 2018-07-29 NOTE — TELEPHONE ENCOUNTER
"Requested Prescriptions   Pending Prescriptions Disp Refills     lithium 300 MG capsule [Pharmacy Med Name: LITHIUM CARBONATE 300MG CAPSULES]      Last Written Prescription Date:  05/07/2018  Last Fill Quantity: 90,   # refills: 0  Last Office Visit: 04/24/2018  Future Office visit:       Routing refill request to provider for review/approval because:  Drug not on the G, P or [facility] refill protocol or controlled substance   90 capsule 0     Sig: TAKE 1 CAPSULE(300 MG) BY MOUTH DAILY    There is no refill protocol information for this order        traZODone (DESYREL) 50 MG tablet [Pharmacy Med Name: TRAZODONE 50MG TABLETS]  Last Written Prescription Date:  05/07/2018  Last Fill Quantity: 90,  # refills: 0   Last office visit: 4/24/2018 with prescribing provider:  LONNIE    Future Office Visit:     90 tablet 0     Sig: TAKE 1/2 TO 1 TABLET(25 TO 50 MG) BY MOUTH EVERY NIGHT AS NEEDED FOR SLEEP    Serotonin Modulators Passed    7/29/2018  1:39 PM       Passed - Recent (12 mo) or future (30 days) visit within the authorizing provider's specialty    Patient had office visit in the last 12 months or has a visit in the next 30 days with authorizing provider or within the authorizing provider's specialty.  See \"Patient Info\" tab in inbasket, or \"Choose Columns\" in Meds & Orders section of the refill encounter.           Passed - Patient is age 18 or older       Passed - No active pregnancy on record       Passed - No positive pregnancy test in past 12 months          "

## 2018-07-30 RX ORDER — TRAZODONE HYDROCHLORIDE 50 MG/1
TABLET, FILM COATED ORAL
Qty: 90 TABLET | Refills: 0 | Status: SHIPPED | OUTPATIENT
Start: 2018-07-30 | End: 2018-10-31

## 2018-07-30 NOTE — TELEPHONE ENCOUNTER
PHQ-9 SCORE 10/14/2016 5/2/2017 1/20/2018   Total Score - - -   Total Score MyChart - - 4 (Minimal depression)   Total Score 0 1 4

## 2018-07-31 RX ORDER — LITHIUM CARBONATE 300 MG/1
CAPSULE ORAL
Qty: 90 CAPSULE | Refills: 0 | Status: SHIPPED | OUTPATIENT
Start: 2018-07-31 | End: 2018-10-31

## 2018-10-13 ENCOUNTER — VIRTUAL VISIT (OUTPATIENT)
Dept: FAMILY MEDICINE | Facility: OTHER | Age: 43
End: 2018-10-13

## 2018-10-15 NOTE — PROGRESS NOTES
"Date:   Clinician: Luis Enrique Henderson  Clinician NPI: [id number]  Patient: Michael Mayen  Patient : 1975  Patient Address: [address] [place] [address]  Patient Phone: [phone]  Visit Protocol: UTI  Patient Summary:  Michael is a 43 year old ( : 1975 ) female who initiated a Visit for a presumed bladder infection. When asked the question \"Please sign me up to receive news, health information and promotions. \", Michael responded \"No\".   Her symptoms started 1-3 days ago and consist of urgency, feeling as if the bladder is never empty, chills, dysuria, urinary incontinence, and urinary frequency.   Symptom details   Urine color: The color of her urine is yellow.    Denied symptoms include foul-smelling urine, flank pain, vaginal itching, nausea, vaginal discharge, abdominal pain, and vomiting. She does not feel feverish.   Over-the-counter medications or home remedies used to relieve the current symptoms as reported by the patient (free text): AZO for the pain/discomfort; cranberry juice, water and other ways to hydrate for 'flushing'   Precipitating events  Michael denies having a sexually transmitted disease.  Pertinent medical history  Michael has had a bladder infection before but has not had any in the past 12 months. Her current symptoms are similar to her previous bladder infection symptoms.   Nitrofurantoin (Macrobid) and ciprofloxacin (Cipro) has been effective in treating her past bladder infections.   She has experienced problems or side effects with the following antibiotics in the past: sulfamethoxazole-trimethoprim (Bactrim DS).  Problems or side effects as reported by the patient (free text): Lupus flare-like symptoms; no sulfa drugs   Hong-Christine has not been prescribed antibiotics to prevent frequent or repeated bladder infections in the past and does not get yeast infections when she takes antibiotics.   Michael does not have a history of kidney " stones. She has not used a catheter or been a patient in a hospital or nursing home in the past 2 weeks.   Michael does not smoke or use smokeless tobacco.   She denies pregnancy and denies breastfeeding. She is currently menstruating.   MEDICATIONS: AZO Standard Maximum Strength oral, ALLERGIES: Sulfa (Sulfonamide Antibiotics)  Clinician Response:  Dear Michael,  Based on the information you have provided, you likely have an acute urinary tract infection, also called a bladder infection. Bladder infections occur when bacteria from the outside of the body enters the urinary tract. Any part of the urinary system can be infected, but the bladder is the most common.  Medication information  I am prescribing:     Cephalexin (Keflex) 500 mg oral capsule. Take 1 capsule by mouth every 12 hours for 7 days. There are no refills with this prescription.   The medication I prescribed for your bladder infection is an antibiotic. Continue taking the medication until it is gone even if you feel better. If you get an upset stomach while taking antibiotics, taking the medication with food can help.   Yeast infections can be a common side effect of antibiotics. The most common symptom of a yeast infection is itchiness in and around the vagina. Other signs and symptoms include burning, redness, or a thick, white vaginal discharge that looks like cottage cheese and does not have a bad smell.  Self care  Urination helps to flush bacteria from the urinary tract. For this reason, drinking water and urinating often helps relieve some symptoms of a bladder infection and can decrease your risk of getting bladder infections in the future.  Other steps you can take to prevent future bladder infections include:     Wipe front to back after using the bathroom    Urinate after sexual intercourse    Avoid using deodorant sprays, douches, or powders in the vaginal area     When to seek care  Please make an appointment to be seen in a clinic or  urgent care if any of the following occur:     You develop new symptoms or your symptoms become worse    You have medication side effects that make it difficult to take them as prescribed    Your symptoms do not improve within 1-2 days of starting treatment    You have symptoms of a bladder infection that return shortly after completing treatment     It is possible to have an allergic reaction to an antibiotic even if you have not had one in the past. If you notice a new rash, significant swelling, or difficulty breathing, stop taking this medication immediately and go to a clinic or urgent care.   Diagnosis: Acute uncomplicated bladder infection  Diagnosis ICD: N39.0  Prescription: cephalexin (Keflex) 500 mg oral capsule 14 capsule, 7 days supply. Take 1 capsule by mouth every 12 hours for 7 days. Refills: 0, Refill as needed: no, Allow substitutions: yes  Pharmacy: [facility] [id number] - [phone] - [address] [place] [address]

## 2018-10-31 DIAGNOSIS — F31.76 BIPOLAR DISORDER, IN FULL REMISSION, MOST RECENT EPISODE DEPRESSED (H): ICD-10-CM

## 2018-10-31 NOTE — TELEPHONE ENCOUNTER
"TRAZODONE 50MG TABLETS  Last Written Prescription Date:  07/30/18  Last Fill Quantity: 90,  # refills: 0   Last office visit: 4/24/2018 with prescribing provider:  04/24/18   Future Office Visit:    Requested Prescriptions   Pending Prescriptions Disp Refills     traZODone (DESYREL) 50 MG tablet [Pharmacy Med Name: TRAZODONE 50MG TABLETS] 90 tablet 0     Sig: TAKE 1/2 TO 1 TABLET(25 TO 50 MG) BY MOUTH EVERY NIGHT AS NEEDED FOR SLEEP    Serotonin Modulators Passed    10/31/2018 11:50 AM       Passed - Recent (12 mo) or future (30 days) visit within the authorizing provider's specialty    Patient had office visit in the last 12 months or has a visit in the next 30 days with authorizing provider or within the authorizing provider's specialty.  See \"Patient Info\" tab in inbasket, or \"Choose Columns\" in Meds & Orders section of the refill encounter.             Passed - Patient is age 18 or older       Passed - No active pregnancy on record       Passed - No positive pregnancy test in past 12 months        lithium 300 MG capsule [Pharmacy Med Name: LITHIUM CARBONATE 300MG CAPSULES] 90 capsule 0     Sig: TAKE 1 CAPSULE(300 MG) BY MOUTH DAILY    There is no refill protocol information for this order        LITHIUM CARBONATE 300MG CAPSULES  Last Written Prescription Date:  07/31/18  Last Fill Quantity: 90,  # refills: 0   Last office visit: 4/24/2018 with prescribing provider:  04/24/18   Future Office Visit:    Requested Prescriptions   Pending Prescriptions Disp Refills     traZODone (DESYREL) 50 MG tablet [Pharmacy Med Name: TRAZODONE 50MG TABLETS] 90 tablet 0     Sig: TAKE 1/2 TO 1 TABLET(25 TO 50 MG) BY MOUTH EVERY NIGHT AS NEEDED FOR SLEEP    Serotonin Modulators Passed    10/31/2018  4:25 PM       Passed - Recent (12 mo) or future (30 days) visit within the authorizing provider's specialty    Patient had office visit in the last 12 months or has a visit in the next 30 days with authorizing provider or within the " "authorizing provider's specialty.  See \"Patient Info\" tab in inbasket, or \"Choose Columns\" in Meds & Orders section of the refill encounter.             Passed - Patient is age 18 or older       Passed - No active pregnancy on record       Passed - No positive pregnancy test in past 12 months        lithium 300 MG capsule [Pharmacy Med Name: LITHIUM CARBONATE 300MG CAPSULES] 90 capsule 0     Sig: TAKE 1 CAPSULE(300 MG) BY MOUTH DAILY    There is no refill protocol information for this order          "

## 2018-11-05 RX ORDER — TRAZODONE HYDROCHLORIDE 50 MG/1
TABLET, FILM COATED ORAL
Qty: 90 TABLET | Refills: 0 | Status: SHIPPED | OUTPATIENT
Start: 2018-11-05 | End: 2019-01-14

## 2018-11-05 NOTE — TELEPHONE ENCOUNTER
PHQ-9 SCORE 10/14/2016 5/2/2017 1/20/2018   Total Score - - -   Total Score MyChart - - 4 (Minimal depression)   Total Score 0 1 4     Medication is being filled for 1 time refill only due to:  Patient needs to be seen because PHQ-9. PHQ-9 was sent to patient via [employer].     Lithium     Routing refill request to provider for review/approval because:  Drug not on the [facility] refill protocol

## 2018-11-07 RX ORDER — LITHIUM CARBONATE 300 MG/1
CAPSULE ORAL
Qty: 30 CAPSULE | Refills: 0 | Status: SHIPPED | OUTPATIENT
Start: 2018-11-07 | End: 2019-01-14

## 2018-11-07 NOTE — TELEPHONE ENCOUNTER
Overdue for office visit, only 1 month supply submitted.  No additional refills until seen in clinic.    Please call and inform pt to schedule a/an office visit or an evisit.

## 2019-01-14 ENCOUNTER — HOSPITAL ENCOUNTER (OUTPATIENT)
Dept: MAMMOGRAPHY | Facility: CLINIC | Age: 44
Discharge: HOME | End: 2019-01-14
Attending: PHYSICIAN ASSISTANT | Admitting: PHYSICIAN ASSISTANT
Payer: COMMERCIAL

## 2019-01-14 ENCOUNTER — OFFICE VISIT (OUTPATIENT)
Dept: FAMILY MEDICINE | Facility: CLINIC | Age: 44
End: 2019-01-14
Payer: COMMERCIAL

## 2019-01-14 VITALS
TEMPERATURE: 97.6 F | BODY MASS INDEX: 28.9 KG/M2 | OXYGEN SATURATION: 98 % | SYSTOLIC BLOOD PRESSURE: 102 MMHG | WEIGHT: 158 LBS | HEART RATE: 63 BPM | DIASTOLIC BLOOD PRESSURE: 60 MMHG

## 2019-01-14 DIAGNOSIS — Z12.39 BREAST CANCER SCREENING: ICD-10-CM

## 2019-01-14 DIAGNOSIS — Z51.81 ENCOUNTER FOR THERAPEUTIC DRUG MONITORING: ICD-10-CM

## 2019-01-14 DIAGNOSIS — I10 BENIGN ESSENTIAL HYPERTENSION: ICD-10-CM

## 2019-01-14 DIAGNOSIS — F31.76 BIPOLAR DISORDER, IN FULL REMISSION, MOST RECENT EPISODE DEPRESSED (H): Primary | ICD-10-CM

## 2019-01-14 LAB
ANION GAP SERPL CALCULATED.3IONS-SCNC: 9 MMOL/L (ref 3–14)
BUN SERPL-MCNC: 15 MG/DL (ref 7–30)
CALCIUM SERPL-MCNC: 8.9 MG/DL (ref 8.5–10.1)
CHLORIDE SERPL-SCNC: 106 MMOL/L (ref 94–109)
CO2 SERPL-SCNC: 22 MMOL/L (ref 20–32)
CREAT SERPL-MCNC: 0.65 MG/DL (ref 0.52–1.04)
ERYTHROCYTE [DISTWIDTH] IN BLOOD BY AUTOMATED COUNT: 13.5 % (ref 10–15)
GFR SERPL CREATININE-BSD FRML MDRD: >90 ML/MIN/{1.73_M2}
GLUCOSE SERPL-MCNC: 78 MG/DL (ref 70–99)
HCT VFR BLD AUTO: 41.6 % (ref 35–47)
HGB BLD-MCNC: 13.5 G/DL (ref 11.7–15.7)
LITHIUM SERPL-SCNC: 0.27 MMOL/L (ref 0.6–1.2)
MCH RBC QN AUTO: 29 PG (ref 26.5–33)
MCHC RBC AUTO-ENTMCNC: 32.5 G/DL (ref 31.5–36.5)
MCV RBC AUTO: 90 FL (ref 78–100)
PLATELET # BLD AUTO: 301 10E9/L (ref 150–450)
POTASSIUM SERPL-SCNC: 3.8 MMOL/L (ref 3.4–5.3)
RBC # BLD AUTO: 4.65 10E12/L (ref 3.8–5.2)
SODIUM SERPL-SCNC: 137 MMOL/L (ref 133–144)
TSH SERPL DL<=0.005 MIU/L-ACNC: 2.21 MU/L (ref 0.4–4)
WBC # BLD AUTO: 5.5 10E9/L (ref 4–11)

## 2019-01-14 PROCEDURE — 84443 ASSAY THYROID STIM HORMONE: CPT | Performed by: PHYSICIAN ASSISTANT

## 2019-01-14 PROCEDURE — 85027 COMPLETE CBC AUTOMATED: CPT | Performed by: PHYSICIAN ASSISTANT

## 2019-01-14 PROCEDURE — 80178 ASSAY OF LITHIUM: CPT | Performed by: PHYSICIAN ASSISTANT

## 2019-01-14 PROCEDURE — 36415 COLL VENOUS BLD VENIPUNCTURE: CPT | Performed by: PHYSICIAN ASSISTANT

## 2019-01-14 PROCEDURE — 77063 BREAST TOMOSYNTHESIS BI: CPT

## 2019-01-14 PROCEDURE — 80048 BASIC METABOLIC PNL TOTAL CA: CPT | Performed by: PHYSICIAN ASSISTANT

## 2019-01-14 PROCEDURE — 99214 OFFICE O/P EST MOD 30 MIN: CPT | Performed by: PHYSICIAN ASSISTANT

## 2019-01-14 RX ORDER — TRAZODONE HYDROCHLORIDE 50 MG/1
TABLET, FILM COATED ORAL
Qty: 90 TABLET | Refills: 1 | Status: SHIPPED | OUTPATIENT
Start: 2019-01-14 | End: 2019-10-22

## 2019-01-14 RX ORDER — AMLODIPINE AND BENAZEPRIL HYDROCHLORIDE 10; 20 MG/1; MG/1
1 CAPSULE ORAL DAILY
Qty: 90 CAPSULE | Refills: 3 | Status: CANCELLED | OUTPATIENT
Start: 2019-01-14

## 2019-01-14 RX ORDER — AMLODIPINE AND BENAZEPRIL HYDROCHLORIDE 5; 10 MG/1; MG/1
1 CAPSULE ORAL DAILY
Qty: 30 CAPSULE | Refills: 0 | Status: SHIPPED | OUTPATIENT
Start: 2019-01-14 | End: 2019-07-16

## 2019-01-14 RX ORDER — LITHIUM CARBONATE 300 MG/1
CAPSULE ORAL
Qty: 90 CAPSULE | Refills: 1 | Status: SHIPPED | OUTPATIENT
Start: 2019-01-14 | End: 2019-10-22

## 2019-01-14 ASSESSMENT — PATIENT HEALTH QUESTIONNAIRE - PHQ9
SUM OF ALL RESPONSES TO PHQ QUESTIONS 1-9: 3
SUM OF ALL RESPONSES TO PHQ QUESTIONS 1-9: 3
10. IF YOU CHECKED OFF ANY PROBLEMS, HOW DIFFICULT HAVE THESE PROBLEMS MADE IT FOR YOU TO DO YOUR WORK, TAKE CARE OF THINGS AT HOME, OR GET ALONG WITH OTHER PEOPLE: NOT DIFFICULT AT ALL

## 2019-01-14 NOTE — PROGRESS NOTES
SUBJECTIVE:   Everette Mayen is a 43 year old female who presents to clinic today for the following health issues:      Hypertension Follow-up      Outpatient blood pressures are not being checked.    Low Salt Diet: no added salt      Amount of exercise or physical activity: 6-7 days/week for an average of 30-45 minutes    Problems taking medications regularly: No    Medication side effects: none    Diet: regular (no restrictions)      Answers for HPI/ROS submitted by the patient on 1/14/2019   If you checked off any problems, how difficult have these problems made it for you to do your work, take care of things at home, or get along with other people?: Not difficult at all  PHQ9 TOTAL SCORE: 3  Reviewed and updated as needed this visit by clinical staff  Tobacco  Allergies  Meds  Problems  Med Hx  Surg Hx  Fam Hx  Soc Hx        Reviewed and updated as needed this visit by Provider  Tobacco  Allergies  Meds  Problems  Med Hx  Surg Hx  Fam Hx  Soc Hx        Additional complaints: None    HPI additional notes: Mable presents today with   Chief Complaint   Patient presents with     Recheck Medication   Pt has had 30 lb weight loss since April.  She has not been checking her blood pressure at home but it is a little low this am.  Denies any dizziness or lightheadedness.  Has had some irregular periods with two periods in December and has noted some increased hair loss.  Her mood is doing well.  She is due for labs today.       ROS:  C: Negative for fever, chills, recent change in weight  Skin: Negative for worrisome rashes or lesions  ENT: Negative for ear, mouth and throat problems  Resp: Negative for significant cough or SOB  CV: Negative for chest pain or peripheral edema  GI: Negative for nausea, abdominal pain, heartburn, or change in bowel habits  : POSITIVE for irregular menses. Negative for dysuria  MS: Negative for significant arthralgias or myalgias  ENDOCRINE: POSITIVE  for hair loss  P:  Negative for changes in mood or affect  ROS all other systems negative.    Chart Review:  History   Smoking Status     Never Smoker   Smokeless Tobacco     Never Used       Recent Labs   Lab Test 01/20/18  0830 12/01/17  0912 05/05/17  1032 12/02/16  0931 11/10/16  0923  02/07/14  1000   LDL  --   --   --   --   --   --  102   HDL  --   --   --   --   --   --  44*   TRIG  --   --   --   --   --   --  122   ALT  --  25 22 28  --    < > 38   CR 0.51*  --  0.42*  --  0.67   < > 0.50*   GFRESTIMATED >90  --  >90  Non  GFR Calc    --  >90   < > >90   GFRESTBLACK >90  --  >90  African American GFR Calc    --  >90   < > >90   POTASSIUM 3.8  --  4.0  --  4.0   < > 4.1   TSH 2.06  --   --   --  2.63  --  1.65    < > = values in this interval not displayed.      BP Readings from Last 3 Encounters:   01/14/19 102/60   04/24/18 124/78   01/20/18 116/68    Wt Readings from Last 3 Encounters:   01/14/19 71.7 kg (158 lb)   04/24/18 84.4 kg (186 lb)   01/20/18 85.3 kg (188 lb)         Patient Active Problem List   Diagnosis      PSORIASIS currently on Humira     Allergic rhinitis     Vitamin D deficiency     Situational depression     Health Care Home     Benign essential hypertension     Gastritis without bleeding, unspecified chronicity, unspecified gastritis type     Acute left-sided low back pain without sciatica since 10-15     Bipolar disorder, in full remission, most recent episode depressed (H)     History reviewed. No pertinent surgical history.  Problem list, Medication list, Allergies, Medical/Social/Surg hx reviewed in [facility], updated as appropriate.   OBJECTIVE:                                                    /60   Pulse 63   Temp 97.6  F (36.4  C) (Tympanic)   Wt 71.7 kg (158 lb)   SpO2 98%   BMI 28.90 kg/m    Body mass index is 28.9 kg/m .  GENERAL: healthy, alert, in no acute distress  EYES: Grossly normal to inspection, EOMI, PERRL  HENT: Mucous mebranes moist.  NECK: Non-tender, no  adenopathy. Thyroid normal to inspection and palpation  RESP: lungs clear to auscultation - no rales, no rhonchi, no wheezes  CV: regular rate and rhythm, normal S1 S2. No peripheral edema.  SKIN: no suspicious lesions, no rashes  PSYCH: Alert and oriented times 3;  Able to articulate logical thoughts. Affect is normal.    Diagnostic test results:  Pending     ASSESSMENT/PLAN:                                                          ICD-10-CM    1. Bipolar disorder, in full remission, most recent episode depressed (H) F31.76 lithium 300 MG capsule     traZODone (DESYREL) 50 MG tablet   2. Benign essential hypertension I10    3. Encounter for therapeutic drug monitoring Z51.81 Lithium level     TSH with free T4 reflex     Basic metabolic panel     CBC with platelets     amLODIPine-benazepril (LOTREL) 5-10 MG capsule     Labs today.  Will decrease blood pressure dose due to low blood pressure today and 30 lb weight loss.  Pt will return in two weeks for nurse only blood pressure check at which point I will put in refills on the appropriate dose.    Pt has mammogram today and is nervous.  Will try to release results to her as soon as they are back.    Please see patient instructions for treatment details.    Return in about 6 months (around 7/14/2019) for Depression Recheck.    Katelynn Schwartz PA-C  [facility]

## 2019-01-15 ENCOUNTER — MYC MEDICAL ADVICE (OUTPATIENT)
Dept: FAMILY MEDICINE | Facility: CLINIC | Age: 44
End: 2019-01-15

## 2019-01-15 NOTE — RESULT ENCOUNTER NOTE
Carson Akins,    I just wanted to let you know that your lab results have been reviewed and are attached.    Your lab results look great.  Your lithium level is a little low but since you are feeling well, I don't think a dose change is necessary but if you would like a slightly higher dose, please let me know.    Please let me know if you have any questions and have a great week!    Sincerely,    Qiana Schwartz PA-C    [facility]  [address] , [facility] [address]  [place] [address]  [phone] (p)

## 2019-04-06 ENCOUNTER — OFFICE VISIT (OUTPATIENT)
Dept: URGENT CARE | Facility: URGENT CARE | Age: 44
End: 2019-04-06
Payer: COMMERCIAL

## 2019-04-06 VITALS
RESPIRATION RATE: 14 BRPM | TEMPERATURE: 98.4 F | HEART RATE: 80 BPM | SYSTOLIC BLOOD PRESSURE: 124 MMHG | HEIGHT: 62 IN | WEIGHT: 153 LBS | BODY MASS INDEX: 28.16 KG/M2 | DIASTOLIC BLOOD PRESSURE: 82 MMHG

## 2019-04-06 DIAGNOSIS — S92.502A: Primary | ICD-10-CM

## 2019-04-06 PROCEDURE — 99212 OFFICE O/P EST SF 10 MIN: CPT | Performed by: FAMILY MEDICINE

## 2019-04-06 ASSESSMENT — MIFFLIN-ST. JEOR: SCORE: 1292.25

## 2019-04-07 NOTE — PROGRESS NOTES
Subjective: Patient hit her left third toe really hard into metal piece today.  It is not straight.  It does not hurt a lot.    Objective: Left third toe is eccentric in position, cannot tell if it is typical for a fracture or dislocation.    Assessment and plan: I really think this should be seen by orthopedic urgent care.  Unfortunately they are closed now but they will open in the morning and she is not too uncomfortable.  I do not think it would be a simple dislocation that can be easily fixed here tonight.

## 2019-07-11 ENCOUNTER — ALLIED HEALTH/NURSE VISIT (OUTPATIENT)
Dept: NURSING | Facility: CLINIC | Age: 44
End: 2019-07-11
Payer: COMMERCIAL

## 2019-07-11 ENCOUNTER — MYC MEDICAL ADVICE (OUTPATIENT)
Dept: FAMILY MEDICINE | Facility: CLINIC | Age: 44
End: 2019-07-11

## 2019-07-11 VITALS — SYSTOLIC BLOOD PRESSURE: 120 MMHG | DIASTOLIC BLOOD PRESSURE: 78 MMHG

## 2019-07-11 DIAGNOSIS — Z01.30 BP CHECK: Primary | ICD-10-CM

## 2019-07-11 DIAGNOSIS — Z51.81 ENCOUNTER FOR THERAPEUTIC DRUG MONITORING: ICD-10-CM

## 2019-07-11 PROCEDURE — 99207 ZZC NO CHARGE LOS: CPT

## 2019-07-11 NOTE — PROGRESS NOTES
"Subjective     Everette Chicasmahogany Mayen is a 44 year old female who presents to clinic today for the following health issues:    HPI   Hypertension Follow-up      Do you check your blood pressure regularly outside of the clinic? { :051094}     Are you following a low salt diet? { :296358}    Are your blood pressures ever more than 140 on the top number (systolic) OR more   than 90 on the bottom number (diastolic), for example 140/90? { :296364}    Amount of exercise or physical activity: {Exercise frequency days per week:604004}    Problems taking medications regularly: {Med Problems:640745::\"No\"}    Medication side effects: {CHRONIC MED SIDE EFFECTS:588383::\"none\"}    Diet: { :673537}      {additonal problems for provider to add (Optional):016363}    {HIST REVIEW/ LINKS 2 (Optional):808335}    {Additional problems for the provider to add (optional):562272}  Reviewed and updated as needed this visit by Provider         Review of Systems   {ROS COMP (Optional):815349}      Objective    There were no vitals taken for this visit.  There is no height or weight on file to calculate BMI.  Physical Exam   {Exam List (Optional):417274}    {Diagnostic Test Results (Optional):315101::\"Diagnostic Test Results:\",\"Labs reviewed in Epic\"}        {PROVIDER CHARTING PREFERENCE:393693}      "

## 2019-07-11 NOTE — NURSING NOTE
Pt here today for BP check. Wants to know if she can be switched to lower dose of her BP med. 120/78

## 2019-07-16 RX ORDER — AMLODIPINE AND BENAZEPRIL HYDROCHLORIDE 5; 10 MG/1; MG/1
1 CAPSULE ORAL DAILY
Qty: 90 CAPSULE | Refills: 1 | Status: SHIPPED | OUTPATIENT
Start: 2019-07-16 | End: 2019-10-22

## 2019-09-30 ENCOUNTER — HEALTH MAINTENANCE LETTER (OUTPATIENT)
Age: 44
End: 2019-09-30
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
## 2019-10-22 ENCOUNTER — OFFICE VISIT (OUTPATIENT)
Dept: FAMILY MEDICINE | Facility: CLINIC | Age: 44
End: 2019-10-22
Payer: COMMERCIAL

## 2019-10-22 VITALS
HEIGHT: 62 IN | DIASTOLIC BLOOD PRESSURE: 70 MMHG | WEIGHT: 148 LBS | TEMPERATURE: 99.8 F | RESPIRATION RATE: 14 BRPM | HEART RATE: 68 BPM | SYSTOLIC BLOOD PRESSURE: 112 MMHG | BODY MASS INDEX: 27.23 KG/M2 | OXYGEN SATURATION: 99 %

## 2019-10-22 DIAGNOSIS — Z12.31 VISIT FOR SCREENING MAMMOGRAM: ICD-10-CM

## 2019-10-22 DIAGNOSIS — I10 BENIGN ESSENTIAL HYPERTENSION: ICD-10-CM

## 2019-10-22 DIAGNOSIS — Z00.00 ROUTINE GENERAL MEDICAL EXAMINATION AT A HEALTH CARE FACILITY: Primary | ICD-10-CM

## 2019-10-22 DIAGNOSIS — F31.76 BIPOLAR DISORDER, IN FULL REMISSION, MOST RECENT EPISODE DEPRESSED (H): ICD-10-CM

## 2019-10-22 DIAGNOSIS — Z51.81 ENCOUNTER FOR THERAPEUTIC DRUG MONITORING: ICD-10-CM

## 2019-10-22 DIAGNOSIS — N95.1 PERIMENOPAUSAL SYMPTOM: ICD-10-CM

## 2019-10-22 LAB
ERYTHROCYTE [DISTWIDTH] IN BLOOD BY AUTOMATED COUNT: 12.5 % (ref 10–15)
FSH SERPL-ACNC: 16.7 IU/L
HCT VFR BLD AUTO: 41.5 % (ref 35–47)
HGB BLD-MCNC: 13.6 G/DL (ref 11.7–15.7)
LH SERPL-ACNC: 34.6 IU/L
LITHIUM SERPL-SCNC: 0.22 MMOL/L (ref 0.6–1.2)
MCH RBC QN AUTO: 30.3 PG (ref 26.5–33)
MCHC RBC AUTO-ENTMCNC: 32.8 G/DL (ref 31.5–36.5)
MCV RBC AUTO: 92 FL (ref 78–100)
PLATELET # BLD AUTO: 317 10E9/L (ref 150–450)
RBC # BLD AUTO: 4.49 10E12/L (ref 3.8–5.2)
WBC # BLD AUTO: 7.3 10E9/L (ref 4–11)

## 2019-10-22 PROCEDURE — 80178 ASSAY OF LITHIUM: CPT | Performed by: PHYSICIAN ASSISTANT

## 2019-10-22 PROCEDURE — 85027 COMPLETE CBC AUTOMATED: CPT | Performed by: PHYSICIAN ASSISTANT

## 2019-10-22 PROCEDURE — 83002 ASSAY OF GONADOTROPIN (LH): CPT | Performed by: PHYSICIAN ASSISTANT

## 2019-10-22 PROCEDURE — 36415 COLL VENOUS BLD VENIPUNCTURE: CPT | Performed by: PHYSICIAN ASSISTANT

## 2019-10-22 PROCEDURE — 80061 LIPID PANEL: CPT | Performed by: PHYSICIAN ASSISTANT

## 2019-10-22 PROCEDURE — 99396 PREV VISIT EST AGE 40-64: CPT | Performed by: PHYSICIAN ASSISTANT

## 2019-10-22 PROCEDURE — 84443 ASSAY THYROID STIM HORMONE: CPT | Performed by: PHYSICIAN ASSISTANT

## 2019-10-22 PROCEDURE — 83001 ASSAY OF GONADOTROPIN (FSH): CPT | Performed by: PHYSICIAN ASSISTANT

## 2019-10-22 PROCEDURE — 80053 COMPREHEN METABOLIC PANEL: CPT | Performed by: PHYSICIAN ASSISTANT

## 2019-10-22 RX ORDER — AMLODIPINE AND BENAZEPRIL HYDROCHLORIDE 5; 10 MG/1; MG/1
1 CAPSULE ORAL DAILY
Qty: 90 CAPSULE | Refills: 3 | Status: SHIPPED | OUTPATIENT
Start: 2019-10-22 | End: 2020-02-13

## 2019-10-22 RX ORDER — LITHIUM CARBONATE 300 MG/1
CAPSULE ORAL
Qty: 90 CAPSULE | Refills: 1 | Status: SHIPPED | OUTPATIENT
Start: 2019-10-22 | End: 2019-11-20

## 2019-10-22 RX ORDER — TRAZODONE HYDROCHLORIDE 50 MG/1
75-100 TABLET, FILM COATED ORAL AT BEDTIME
Qty: 180 TABLET | Refills: 3 | Status: SHIPPED | OUTPATIENT
Start: 2019-10-22 | End: 2020-12-29

## 2019-10-22 ASSESSMENT — PATIENT HEALTH QUESTIONNAIRE - PHQ9: SUM OF ALL RESPONSES TO PHQ QUESTIONS 1-9: 8

## 2019-10-22 ASSESSMENT — MIFFLIN-ST. JEOR: SCORE: 1264.57

## 2019-10-23 LAB
ALBUMIN SERPL-MCNC: 3.8 G/DL (ref 3.4–5)
ALP SERPL-CCNC: 59 U/L (ref 40–150)
ALT SERPL W P-5'-P-CCNC: 19 U/L (ref 0–50)
ANION GAP SERPL CALCULATED.3IONS-SCNC: 6 MMOL/L (ref 3–14)
AST SERPL W P-5'-P-CCNC: 16 U/L (ref 0–45)
BILIRUB SERPL-MCNC: 0.5 MG/DL (ref 0.2–1.3)
BUN SERPL-MCNC: 14 MG/DL (ref 7–30)
CALCIUM SERPL-MCNC: 8.4 MG/DL (ref 8.5–10.1)
CHLORIDE SERPL-SCNC: 105 MMOL/L (ref 94–109)
CHOLEST SERPL-MCNC: 150 MG/DL
CO2 SERPL-SCNC: 27 MMOL/L (ref 20–32)
CREAT SERPL-MCNC: 0.55 MG/DL (ref 0.52–1.04)
GFR SERPL CREATININE-BSD FRML MDRD: >90 ML/MIN/{1.73_M2}
GLUCOSE SERPL-MCNC: 114 MG/DL (ref 70–99)
HDLC SERPL-MCNC: 57 MG/DL
LDLC SERPL CALC-MCNC: 72 MG/DL
NONHDLC SERPL-MCNC: 93 MG/DL
POTASSIUM SERPL-SCNC: 3.8 MMOL/L (ref 3.4–5.3)
PROT SERPL-MCNC: 7.9 G/DL (ref 6.8–8.8)
SODIUM SERPL-SCNC: 138 MMOL/L (ref 133–144)
TRIGL SERPL-MCNC: 107 MG/DL
TSH SERPL DL<=0.005 MIU/L-ACNC: 1.34 MU/L (ref 0.4–4)

## 2019-10-23 NOTE — RESULT ENCOUNTER NOTE
Carson Akins,    I just wanted to let you know that your lab results have been reviewed and are attached.    - Your blood sugar was high but you weren't fasting so that is not a cause for concern.  Your thyroid is normal.  - Your lithium level is a little low but if you are feeling well on your medication I do not think we should change the dose.  - Your hormonal labs are borderline for menopause so you are likely perimenopausal.  The rest of your labs look great.    Please let me know if you have any questions and have a great week!    Sincerely,    Qiana Schwartz PA-C    [facility]  [address]  [place] [address] [phone] (p)

## 2019-11-20 ENCOUNTER — MYC MEDICAL ADVICE (OUTPATIENT)
Dept: FAMILY MEDICINE | Facility: CLINIC | Age: 44
End: 2019-11-20

## 2019-11-20 DIAGNOSIS — F31.76 BIPOLAR DISORDER, IN FULL REMISSION, MOST RECENT EPISODE DEPRESSED (H): ICD-10-CM

## 2019-11-20 RX ORDER — LITHIUM CARBONATE 600 MG/1
CAPSULE ORAL
Qty: 90 CAPSULE | Refills: 1 | Status: SHIPPED | OUTPATIENT
Start: 2019-11-20 | End: 2020-02-14

## 2019-11-20 RX ORDER — LITHIUM CARBONATE 600 MG/1
600 CAPSULE ORAL
OUTPATIENT
Start: 2019-11-20

## 2019-11-20 NOTE — TELEPHONE ENCOUNTER
Please advice on pt's request should she schedule an e-visit for Rx dose increase?  Routing refill request to provider for review/approval because:  Drug dose not active on patient's medication list

## 2019-12-26 ENCOUNTER — MYC MEDICAL ADVICE (OUTPATIENT)
Dept: FAMILY MEDICINE | Facility: CLINIC | Age: 44
End: 2019-12-26

## 2019-12-26 DIAGNOSIS — F31.76 BIPOLAR DISORDER, IN FULL REMISSION, MOST RECENT EPISODE DEPRESSED (H): Primary | ICD-10-CM

## 2020-01-10 DIAGNOSIS — F31.76 BIPOLAR DISORDER, IN FULL REMISSION, MOST RECENT EPISODE DEPRESSED (H): ICD-10-CM

## 2020-01-10 LAB — LITHIUM SERPL-SCNC: 0.66 MMOL/L (ref 0.6–1.2)

## 2020-01-10 PROCEDURE — 80178 ASSAY OF LITHIUM: CPT | Performed by: PHYSICIAN ASSISTANT

## 2020-01-10 PROCEDURE — 36415 COLL VENOUS BLD VENIPUNCTURE: CPT | Performed by: PHYSICIAN ASSISTANT

## 2020-01-21 ENCOUNTER — ANCILLARY PROCEDURE (OUTPATIENT)
Dept: MAMMOGRAPHY | Facility: CLINIC | Age: 45
End: 2020-01-21
Attending: PHYSICIAN ASSISTANT
Payer: COMMERCIAL

## 2020-01-21 DIAGNOSIS — Z12.31 VISIT FOR SCREENING MAMMOGRAM: ICD-10-CM

## 2020-01-21 PROCEDURE — 77063 BREAST TOMOSYNTHESIS BI: CPT | Mod: TC

## 2020-01-21 PROCEDURE — 77067 SCR MAMMO BI INCL CAD: CPT | Mod: TC

## 2020-02-12 DIAGNOSIS — I10 BENIGN ESSENTIAL HYPERTENSION: ICD-10-CM

## 2020-02-12 NOTE — TELEPHONE ENCOUNTER
"Requested Prescriptions   Pending Prescriptions Disp Refills     amLODIPine-benazepril (LOTREL) 5-10 MG capsule [Pharmacy Med Name: AMLODIPINE-BENAZ 5/10MG CAPSULES]  Last Written Prescription Date:  10/22/2019  Last Fill Quantity: 90 capsule,  # refills: 3   Last office visit: 10/22/2019 with prescribing provider:  Efren   Future Office Visit:     90 capsule 3     Sig: TAKE 1 CAPSULE BY MOUTH DAILY       Calcium Channel Blockers Protocol  Passed - 2/12/2020 12:22 PM        Passed - Blood pressure under 140/90 in past 12 months     BP Readings from Last 3 Encounters:   10/22/19 112/70   07/11/19 120/78   04/06/19 124/82                 Passed - Recent (12 mo) or future (30 days) visit within the authorizing provider's specialty     Patient has had an office visit with the authorizing provider or a provider within the authorizing providers department within the previous 12 mos or has a future within next 30 days. See \"Patient Info\" tab in inbasket, or \"Choose Columns\" in Meds & Orders section of the refill encounter.              Passed - Medication is active on med list        Passed - Patient is age 18 or older        Passed - No active pregnancy on record        Passed - Normal serum creatinine on file in past 12 months     Recent Labs   Lab Test 10/22/19  1334   CR 0.55             Passed - No positive pregnancy test in past 12 months           "

## 2020-02-13 DIAGNOSIS — F31.76 BIPOLAR DISORDER, IN FULL REMISSION, MOST RECENT EPISODE DEPRESSED (H): ICD-10-CM

## 2020-02-13 NOTE — TELEPHONE ENCOUNTER
Prescription approved per [facility]ill Protocol for 12 months of refills since last appointment, which was 10/22/19

## 2020-02-14 RX ORDER — LITHIUM CARBONATE 600 MG/1
CAPSULE ORAL
Qty: 90 CAPSULE | Refills: 1 | Status: SHIPPED | OUTPATIENT
Start: 2020-02-14 | End: 2020-12-29

## 2020-02-14 RX ORDER — AMLODIPINE AND BENAZEPRIL HYDROCHLORIDE 5; 10 MG/1; MG/1
1 CAPSULE ORAL DAILY
Qty: 90 CAPSULE | Refills: 1 | Status: SHIPPED | OUTPATIENT
Start: 2020-02-14 | End: 2020-12-29

## 2020-02-14 NOTE — TELEPHONE ENCOUNTER
Last office visit 10/22/2019.    lithium (ESKALITH) 600 MG capsule 90 capsule 1 11/20/2019         Requested Prescriptions   Pending Prescriptions Disp Refills     lithium (ESKALITH) 600 MG capsule [Pharmacy Med Name: LITHIUM CARBONATE 600MG CAPSULES] 90 capsule 1     Sig: TAKE 1 CAPSULE BY MOUTH EVERY DAY       There is no refill protocol information for this order

## 2020-10-20 ENCOUNTER — HOSPITAL ENCOUNTER (EMERGENCY)
Facility: CLINIC | Age: 45
Discharge: HOME | End: 2020-10-20
Attending: EMERGENCY MEDICINE | Admitting: EMERGENCY MEDICINE
Payer: COMMERCIAL

## 2020-10-20 VITALS
SYSTOLIC BLOOD PRESSURE: 157 MMHG | RESPIRATION RATE: 18 BRPM | DIASTOLIC BLOOD PRESSURE: 107 MMHG | OXYGEN SATURATION: 99 % | HEART RATE: 72 BPM | TEMPERATURE: 97.8 F

## 2020-10-20 DIAGNOSIS — Z11.3 SCREEN FOR STD (SEXUALLY TRANSMITTED DISEASE): ICD-10-CM

## 2020-10-20 PROCEDURE — 99284 EMERGENCY DEPT VISIT MOD MDM: CPT | Mod: 25

## 2020-10-20 PROCEDURE — 96372 THER/PROPH/DIAG INJ SC/IM: CPT | Performed by: EMERGENCY MEDICINE

## 2020-10-20 PROCEDURE — 250N000009 HC RX 250: Performed by: EMERGENCY MEDICINE

## 2020-10-20 PROCEDURE — 250N000011 HC RX IP 250 OP 636: Performed by: EMERGENCY MEDICINE

## 2020-10-20 PROCEDURE — 250N000013 HC RX MED GY IP 250 OP 250 PS 637: Performed by: EMERGENCY MEDICINE

## 2020-10-20 RX ORDER — METRONIDAZOLE 500 MG/1
2000 TABLET ORAL ONCE
Status: COMPLETED | OUTPATIENT
Start: 2020-10-20 | End: 2020-10-20

## 2020-10-20 RX ORDER — AZITHROMYCIN 250 MG/1
1000 TABLET, FILM COATED ORAL ONCE
Status: COMPLETED | OUTPATIENT
Start: 2020-10-20 | End: 2020-10-20

## 2020-10-20 RX ORDER — EMTRICITABINE AND TENOFOVIR DISOPROXIL FUMARATE 200; 300 MG/1; MG/1
1 TABLET, FILM COATED ORAL DAILY
Qty: 28 TABLET | Refills: 0 | Status: SHIPPED | OUTPATIENT
Start: 2020-10-20 | End: 2020-12-28

## 2020-10-20 RX ORDER — EMTRICITABINE AND TENOFOVIR DISOPROXIL FUMARATE 200; 300 MG/1; MG/1
1 TABLET, FILM COATED ORAL ONCE
Status: COMPLETED | OUTPATIENT
Start: 2020-10-20 | End: 2020-10-20

## 2020-10-20 RX ORDER — ONDANSETRON 4 MG/1
4 TABLET, ORALLY DISINTEGRATING ORAL ONCE
Status: COMPLETED | OUTPATIENT
Start: 2020-10-20 | End: 2020-10-20

## 2020-10-20 RX ORDER — ONDANSETRON 4 MG/1
4 TABLET, ORALLY DISINTEGRATING ORAL EVERY 6 HOURS PRN
Qty: 15 TABLET | Refills: 0 | Status: SHIPPED | OUTPATIENT
Start: 2020-10-20 | End: 2020-10-24

## 2020-10-20 RX ADMIN — ONDANSETRON 4 MG: 4 TABLET, ORALLY DISINTEGRATING ORAL at 20:56

## 2020-10-20 RX ADMIN — LIDOCAINE HYDROCHLORIDE 250 MG: 10 INJECTION, SOLUTION EPIDURAL; INFILTRATION; INTRACAUDAL; PERINEURAL at 21:33

## 2020-10-20 RX ADMIN — METRONIDAZOLE 2000 MG: 500 TABLET, FILM COATED ORAL at 20:56

## 2020-10-20 RX ADMIN — AZITHROMYCIN 1000 MG: 250 TABLET, FILM COATED ORAL at 20:56

## 2020-10-20 RX ADMIN — DOLUTEGRAVIR SODIUM 50 MG: 50 TABLET, FILM COATED ORAL at 20:56

## 2020-10-20 RX ADMIN — EMTRICITABINE AND TENOFOVIR DISOPROXIL FUMARATE 1 TABLET: 200; 300 TABLET, FILM COATED ORAL at 20:56

## 2020-10-20 ASSESSMENT — ENCOUNTER SYMPTOMS: RECTAL PAIN: 1

## 2020-10-20 NOTE — ED AVS SNAPSHOT
[facility]  [address] [address]  Phone: [phone]  Fax: [phone]                                    Everette Mayen   MRN: [id number]    Department: [facility]   Date of Visit: 10/20/2020           After Visit Summary Signature Page    I have received my discharge instructions, and my questions have been answered. I have discussed any challenges I see with this plan with the nurse or doctor.    ..........................................................................................................................................  Patient/Patient Representative Signature      ..........................................................................................................................................  Patient Representative Print Name and Relationship to Patient    ..................................................               ................................................  Date                                   Time    ..........................................................................................................................................  Reviewed by Signature/Title    ...................................................              ..............................................  Date                                               Time          22EPIC Rev 08/18

## 2020-10-21 NOTE — DISCHARGE INSTRUCTIONS
We have discussed initiation of postexposure prophylaxis medications for sexually transmitted diseases.  There is clear no clear evidence of blood to blood contact but due to her inability to know and high risk concerns we are initiating HIV prophylaxis as well as you have been treated with ceftriaxone Zithromax and Flagyl for gonorrhea chlamydia and trichomonas respectively.  Please complete 28-day course of postexposure prophylaxis medications.  This was sent to the [facility] pharmacy at [address] [place] [address].  Also nausea medication as the most common cause side effect from these pills is nausea and vomiting.  We would like you to follow-up with your regular doctor to check your kidney and liver function on 2 weeks.  We would asked that you get tested for seroconversion for HIV at 6 weeks 3 months 6 months and 1 year.
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
## 2020-10-27 ENCOUNTER — TELEPHONE (OUTPATIENT)
Dept: OBGYN | Facility: CLINIC | Age: 45
End: 2020-10-27

## 2020-10-27 ENCOUNTER — ALLIED HEALTH/NURSE VISIT (OUTPATIENT)
Dept: OBGYN | Facility: CLINIC | Age: 45
End: 2020-10-27
Payer: COMMERCIAL

## 2020-10-27 VITALS
BODY MASS INDEX: 30.36 KG/M2 | HEIGHT: 62 IN | WEIGHT: 165 LBS | SYSTOLIC BLOOD PRESSURE: 138 MMHG | DIASTOLIC BLOOD PRESSURE: 78 MMHG | HEART RATE: 80 BPM

## 2020-10-27 DIAGNOSIS — Z12.4 CERVICAL CANCER SCREENING: ICD-10-CM

## 2020-10-27 DIAGNOSIS — Z20.2 ENCOUNTER FOR ASSESSMENT OF STD EXPOSURE: Primary | ICD-10-CM

## 2020-10-27 DIAGNOSIS — Z00.00 ANNUAL PHYSICAL EXAM: ICD-10-CM

## 2020-10-27 PROCEDURE — G0008 ADMIN INFLUENZA VIRUS VAC: HCPCS

## 2020-10-27 PROCEDURE — 86696 HERPES SIMPLEX TYPE 2 TEST: CPT | Performed by: OBSTETRICS & GYNECOLOGY

## 2020-10-27 PROCEDURE — 90686 IIV4 VACC NO PRSV 0.5 ML IM: CPT

## 2020-10-27 PROCEDURE — G0463 HOSPITAL OUTPT CLINIC VISIT: HCPCS | Mod: 25

## 2020-10-27 PROCEDURE — 82784 ASSAY IGA/IGD/IGG/IGM EACH: CPT | Performed by: OBSTETRICS & GYNECOLOGY

## 2020-10-27 PROCEDURE — 99202 OFFICE O/P NEW SF 15 MIN: CPT | Performed by: OBSTETRICS & GYNECOLOGY

## 2020-10-27 PROCEDURE — 36415 COLL VENOUS BLD VENIPUNCTURE: CPT | Performed by: OBSTETRICS & GYNECOLOGY

## 2020-10-27 PROCEDURE — 86695 HERPES SIMPLEX TYPE 1 TEST: CPT | Performed by: OBSTETRICS & GYNECOLOGY

## 2020-10-27 PROCEDURE — 87624 HPV HI-RISK TYP POOLED RSLT: CPT | Performed by: OBSTETRICS & GYNECOLOGY

## 2020-10-27 PROCEDURE — 250N000011 HC RX IP 250 OP 636

## 2020-10-27 PROCEDURE — G0145 SCR C/V CYTO,THINLAYER,RESCR: HCPCS | Performed by: OBSTETRICS & GYNECOLOGY

## 2020-10-27 ASSESSMENT — PAIN SCALES - GENERAL: PAINLEVEL: NO PAIN (0)

## 2020-10-27 ASSESSMENT — ANXIETY QUESTIONNAIRES
5. BEING SO RESTLESS THAT IT IS HARD TO SIT STILL: SEVERAL DAYS
2. NOT BEING ABLE TO STOP OR CONTROL WORRYING: SEVERAL DAYS
1. FEELING NERVOUS, ANXIOUS, OR ON EDGE: SEVERAL DAYS
7. FEELING AFRAID AS IF SOMETHING AWFUL MIGHT HAPPEN: SEVERAL DAYS
GAD7 TOTAL SCORE: 6
6. BECOMING EASILY ANNOYED OR IRRITABLE: SEVERAL DAYS
3. WORRYING TOO MUCH ABOUT DIFFERENT THINGS: NOT AT ALL

## 2020-10-27 ASSESSMENT — PATIENT HEALTH QUESTIONNAIRE - PHQ9
SUM OF ALL RESPONSES TO PHQ QUESTIONS 1-9: 10
5. POOR APPETITE OR OVEREATING: SEVERAL DAYS

## 2020-10-27 ASSESSMENT — MIFFLIN-ST. JEOR: SCORE: 1336.69

## 2020-10-27 NOTE — TELEPHONE ENCOUNTER
Received call from lab that the HSV IGM I/II combination ([facility]) order cannot be run as it is for [facility].  They do not have a Type 1/2 IGM that they are able to run.   They can do HSV IGM antibody so this was ordered.    Notified Dr. Paulino.

## 2020-10-27 NOTE — PROGRESS NOTES
Progress Note    SUBJECTIVE:  Everette Mayen is an 45 year old, , who is here for possible STD exposure.   She was seen in ED ands treated for STIs ( GC/CT/Trich and given PEP at her request).  Her insurance cover her PEP and she only had one day of nausea. She continues to have some vulvar pain and rectal pain.  She wonders if there is any tissue trauma.  Agreeable to pap  And flu shot today.      Menstrual History:  Menstrual History 2019 10/22/2019 10/27/2020   LAST MENSTRUAL PERIOD 3/30/2019 10/12/2019 2020       Last    Lab Results   Component Value Date    PAP NIL 2016     History of abnormal Pap smear: none    Last   Lab Results   Component Value Date    HPV16 Negative 2016     Last   Lab Results   Component Value Date    HPV18 Negative 2016     Last   Lab Results   Component Value Date    HRHPV Negative 2016           HISTORY:  Prescription Medications as of 10/27/2020       Rx Number Disp Refills Start End Last Dispensed Date Next Fill Date Owning Pharmacy    adalimumab (HUMIRA) 40 MG/0.8ML prefilled syringe kit  2 each 5 2017    [facility] [place] [address]    Sig: Inject 0.8 mLs (40 mg) Subcutaneous every 14 days    Class: E-Prescribe    Route: Subcutaneous    amLODIPine-benazepril (LOTREL) 5-10 MG capsule  90 capsule 1 2020    [id number] - [place], [place] - [address] AT [address] [place] [address]    Sig: TAKE 1 CAPSULE BY MOUTH DAILY    Class: E-Prescribe    Route: Oral    clobetasol (TEMOVATE) 0.05 % external solution  50 mL 3 2017    [facility] [place] [address]    Sig: Apply to scalp daily as needed for psoriasis flare    Class: E-Prescribe    clobetasol (TEMOVATE) 0.05 % ointment            Sig: Apply topically 2 times daily as needed (Psoriasis)    Class: Historical    Route: Topical    dolutegravir (TIVICAY) 50 MG tablet  28 tablet 0  10/20/2020 2020   [id number] - [place], [place] - [address] [place] [address]    Sig: Take 1 tablet (50 mg) by mouth daily for 28 days    Class: E-Prescribe    Route: Oral    emtricitabine-tenofovir (TRUVADA) 200-300 MG per tablet  28 tablet 0 10/20/2020 2020   [id number] - [place] - [address] [facility] [address] [facility] [address] [place]    Sig: Take 1 tablet by mouth daily for 28 days    Class: E-Prescribe    Route: Oral    hydrocortisone 2.5 % ointment  30 g 3 2017    [facility] - [place], [place] - [address]    Sig: Apply twice daily to affected area in the groin when needed x 5 days    Class: E-Prescribe    lithium (ESKALITH) 600 MG capsule  90 capsule 1 2020    [id number] - [place], [place] - [address] AT [address] [place] [address]    Sig: TAKE 1 CAPSULE BY MOUTH EVERY DAY    Class: E-Prescribe    traZODone (DESYREL) 50 MG tablet  180 tablet 3 10/22/2019    [id number] - [place], [place] - [address] AT [facility]    Sig: Take 1.5-2 tablets ( mg) by mouth At Bedtime    Class: E-Prescribe    Route: Oral        Allergies   Allergen Reactions     Sulfa Drugs Hives     Lupus Erythematous     Lamotrigine Rash     Immunization History   Administered Date(s) Administered     FLU 6-35 months 2009     Influenza (IIV3) PF 2008, 2013, 2014     Influenza Vaccine IM > 6 months Valent IIV4 10/22/2019     TD (ADULT, 7+) 2018     TDAP Vaccine (Adacel) 10/30/2007       OB History    Para Term  AB Living   1 0 0 0 0 0   SAB TAB Ectopic Multiple Live Births   0 0 0 0 0     Past Medical History:   Diagnosis Date     Allergic rhinitis, cause unspecified      Depressive disorder, not elsewhere classified      Hypertension      Other psoriasis      Unspecified complication of pregnancy, unspecified as to episode of  "care 3/09    Willis's/2nd trimester termination     No past surgical history on file.  Family History   Problem Relation Age of Onset     Hypertension Mother      Allergies Mother      Arthritis Mother         psoriasis     Depression Mother      Genitourinary Problems Mother         urinary incontinence     Gynecology Mother         hyst age 48     Alcohol/Drug Father      Depression Father      Diabetes Maternal Grandmother      Eye Disorder Maternal Grandmother      Cerebrovascular Disease Maternal Grandfather      Cardiovascular Paternal Grandfather      Other Cancer Other      Genetic Disorder Son         No further genetic testing planned     Cancer No family hx of         No family history skin cancer     Melanoma No family hx of      Skin Cancer No family hx of      Coronary Artery Disease No family hx of      Hyperlipidemia No family hx of      Breast Cancer No family hx of      Colon Cancer No family hx of      Prostate Cancer No family hx of      Anxiety Disorder No family hx of      Mental Illness No family hx of      Substance Abuse No family hx of      Anesthesia Reaction No family hx of      Asthma No family hx of      Osteoporosis No family hx of      Thyroid Disease No family hx of      Obesity No family hx of      Unknown/Adopted No family hx of      Social History      Public Health professor, , recent dating activity, seeing a therapist     ROS    EXAM:  Blood pressure 138/78, pulse 80, height 1.575 m (5' 2\"), weight 74.8 kg (165 lb), last menstrual period 09/20/2020, not currently breastfeeding. Body mass index is 30.18 kg/m .  General - pleasant female in no acute distress, masked  Skin - no suspicious lesions or rashes    Breast Exam:  Breast: Without visible skin changes. No dimpling or lesions seen.   Breasts supple, non-tender with palpation, no dominant mass, nodularity, or nipple discharge noted bilaterally. Axillary nodes negative.      Pelvic Exam:  EG/BUS: 3 x 3mm abrasion on " rt labia majora, BAtholin and Skenes nl.  Urethral meatus: normal   Urethra: no masses, tenderness, or scarring   Bladder: no masses or tenderness   Vagina: moist, pink, rugae with creamy, white and odorless  secretions  Cervix: Multiparous, and no lesions  Uterus: anteverted,   Adnexa: Within normal limits and No masses, nodularity, tenderness  Rectum:anus normal,      ASSESSMENT:  Encounter Diagnosis   Name Primary?     Encounter for assessment of STD exposure Yes        PLAN:   Ck HSV IgG and IgM  Sent pap  Explained IgG is likely positive, briefly discussed if IgM is positive using condoms and avoiding sex during outbreak, taking Valcyclovir at beginning of symptoms to decrease viral shedding.  Orders Placed This Encounter   Procedures     FLU VAC PRESRV FREE QUAD SPLIT VIR 3+YRS IM       Follow-up as needed.    Cheryle Paulino MD

## 2020-10-28 LAB
HSV1 IGG SERPL QL IA: 2.2 AI (ref 0–0.8)
HSV2 IGG SERPL QL IA: >8 AI (ref 0–0.8)
IGM SERPL-MCNC: 104 MG/DL (ref 35–242)

## 2020-10-28 ASSESSMENT — ANXIETY QUESTIONNAIRES: GAD7 TOTAL SCORE: 6

## 2020-11-02 LAB
COPATH REPORT: NORMAL
PAP: NORMAL

## 2020-11-05 LAB
FINAL DIAGNOSIS: NORMAL
HPV HR 12 DNA CVX QL NAA+PROBE: NEGATIVE
HPV16 DNA SPEC QL NAA+PROBE: NEGATIVE
HPV18 DNA SPEC QL NAA+PROBE: NEGATIVE
SPECIMEN DESCRIPTION: NORMAL
SPECIMEN SOURCE CVX/VAG CYTO: NORMAL

## 2020-12-28 NOTE — PROGRESS NOTES
"Pre-Visit Planning   Next 5 appointments (look out 90 days)    Dec 29, 2020  Arrive by 3:45 PM  Annual Wellness Visit with Katelynn Schwartz PA-C  [facility] [address] [facility] [address] [phone]        Appointment Notes for this encounter:   physical    Questionnaires Reviewed/Assigned  No additional questionnaires are needed        Patient preferred phone number: [phone]      Spoke to patient via phone. Patient does not have additional questions or concerns.        Visit is preventive. Reviewed purpose of preventive visit with patient.    Patient is established.    Patient reminded of date and time.  Chief complaint confirmed.    Health Maintenance Due   Topic Date Due     ANNUAL REVIEW OF HM ORDERS  1975     PREVENTIVE CARE VISIT  10/22/2020     [employer]  Patient is active on [employer].    Questionnaire Review   Offered information on completing questionnaires via [employer].    Call Summary  \"Thank you for your time today.  If anything comes up before your appointment, please feel free to contact us at [phone].\"     "

## 2020-12-29 ENCOUNTER — OFFICE VISIT (OUTPATIENT)
Dept: FAMILY MEDICINE | Facility: CLINIC | Age: 45
End: 2020-12-29
Payer: COMMERCIAL

## 2020-12-29 VITALS
SYSTOLIC BLOOD PRESSURE: 130 MMHG | TEMPERATURE: 97.4 F | BODY MASS INDEX: 30 KG/M2 | OXYGEN SATURATION: 98 % | HEIGHT: 62 IN | HEART RATE: 80 BPM | DIASTOLIC BLOOD PRESSURE: 78 MMHG | WEIGHT: 163 LBS | RESPIRATION RATE: 20 BRPM

## 2020-12-29 DIAGNOSIS — N91.2 AMENORRHEA: ICD-10-CM

## 2020-12-29 DIAGNOSIS — F31.76 BIPOLAR DISORDER, IN FULL REMISSION, MOST RECENT EPISODE DEPRESSED (H): ICD-10-CM

## 2020-12-29 DIAGNOSIS — Z00.00 ROUTINE HISTORY AND PHYSICAL EXAMINATION OF ADULT: Primary | ICD-10-CM

## 2020-12-29 DIAGNOSIS — I10 BENIGN ESSENTIAL HYPERTENSION: ICD-10-CM

## 2020-12-29 DIAGNOSIS — L40.8 OTHER PSORIASIS: ICD-10-CM

## 2020-12-29 DIAGNOSIS — Z12.31 VISIT FOR SCREENING MAMMOGRAM: ICD-10-CM

## 2020-12-29 DIAGNOSIS — Z20.2 POSSIBLE EXPOSURE TO STD: ICD-10-CM

## 2020-12-29 LAB — HCG UR QL: NEGATIVE

## 2020-12-29 PROCEDURE — 87389 HIV-1 AG W/HIV-1&-2 AB AG IA: CPT | Performed by: PHYSICIAN ASSISTANT

## 2020-12-29 PROCEDURE — 36415 COLL VENOUS BLD VENIPUNCTURE: CPT | Performed by: PHYSICIAN ASSISTANT

## 2020-12-29 PROCEDURE — 99396 PREV VISIT EST AGE 40-64: CPT | Performed by: PHYSICIAN ASSISTANT

## 2020-12-29 PROCEDURE — 81025 URINE PREGNANCY TEST: CPT | Performed by: PHYSICIAN ASSISTANT

## 2020-12-29 RX ORDER — LITHIUM CARBONATE 300 MG/1
CAPSULE ORAL
Qty: 90 CAPSULE | Refills: 1 | Status: SHIPPED | OUTPATIENT
Start: 2020-12-29 | End: 2021-01-25

## 2020-12-29 RX ORDER — TRAZODONE HYDROCHLORIDE 50 MG/1
75-100 TABLET, FILM COATED ORAL AT BEDTIME
Qty: 180 TABLET | Refills: 3 | Status: CANCELLED | OUTPATIENT
Start: 2020-12-29

## 2020-12-29 RX ORDER — CLOBETASOL PROPIONATE 0.5 MG/G
OINTMENT TOPICAL 2 TIMES DAILY PRN
Qty: 60 G | Refills: 11 | Status: SHIPPED | OUTPATIENT
Start: 2020-12-29

## 2020-12-29 RX ORDER — AMLODIPINE AND BENAZEPRIL HYDROCHLORIDE 5; 10 MG/1; MG/1
1 CAPSULE ORAL DAILY
Qty: 90 CAPSULE | Refills: 1 | Status: SHIPPED | OUTPATIENT
Start: 2020-12-29 | End: 2021-12-28

## 2020-12-29 ASSESSMENT — ENCOUNTER SYMPTOMS
DIZZINESS: 0
PARESTHESIAS: 0
NERVOUS/ANXIOUS: 1
HEARTBURN: 0
WEAKNESS: 0
FEVER: 0
DYSURIA: 0
DIARRHEA: 0
HEMATOCHEZIA: 0
HEMATURIA: 0
CHILLS: 0
SHORTNESS OF BREATH: 0
CONSTIPATION: 0
COUGH: 0
ARTHRALGIAS: 0
NAUSEA: 0
MYALGIAS: 0
SORE THROAT: 0
PALPITATIONS: 0
FREQUENCY: 0
ABDOMINAL PAIN: 0
JOINT SWELLING: 0
BREAST MASS: 0
HEADACHES: 0
EYE PAIN: 0

## 2020-12-29 ASSESSMENT — MIFFLIN-ST. JEOR: SCORE: 1327.61

## 2020-12-29 NOTE — PATIENT INSTRUCTIONS
Preventive Health Recommendations  Female Ages 40 to 49    Yearly exam:     See your health care provider every year in order to  1. Review health changes.   2. Discuss preventive care.    3. Review your medicines if your doctor prescribed any.      Get a Pap test every three years (unless you have an abnormal result and your provider advises testing more often).      If you get Pap tests with HPV test, you only need to test every 5 years, unless you have an abnormal result. You do not need a Pap test if your uterus was removed (hysterectomy) and you have not had cancer.      You should be tested each year for STDs (sexually transmitted diseases), if you're at risk.     Ask your doctor if you should have a mammogram.      Have a colonoscopy (test for colon cancer) if someone in your family has had colon cancer or polyps before age 50.       Have a cholesterol test every 5 years.       Have a diabetes test (fasting glucose) after age 45. If you are at risk for diabetes, you should have this test every 3 years.    Shots: Get a flu shot each year. Get a tetanus shot every 10 years.     Nutrition:     Eat at least 5 servings of fruits and vegetables each day.    Eat whole-grain bread, whole-wheat pasta and brown rice instead of white grains and rice.    Get adequate Calcium and Vitamin D.      Lifestyle    Exercise at least 150 minutes a week (an average of 30 minutes a day, 5 days a week). This will help you control your weight and prevent disease.    Limit alcohol to one drink per day.    No smoking.     Wear sunscreen to prevent skin cancer.    See your dentist every six months for an exam and cleaning.   negative No oral lesions; no gross abnormalities

## 2020-12-29 NOTE — PROGRESS NOTES
SUBJECTIVE:   CC: Everette Kign Javon Mayen is an 45 year old woman who presents for preventive health visit.     Patient has been advised of split billing requirements and indicates understanding: Yes     Healthy Habits:     Getting at least 3 servings of Calcium per day:  NO    Bi-annual eye exam:  Yes    Dental care twice a year:  Yes    Sleep apnea or symptoms of sleep apnea:  None    Diet:  Regular (no restrictions)    Frequency of exercise:  6-7 days/week    Duration of exercise:  45-60 minutes    Taking medications regularly:  Yes    Medication side effects:  None    PHQ-2 Total Score: 0    Additional concerns today:  Yes      Stopped lithium, says behavior has been a little more erratic.  Would like to restart, we will try at a lower dose.  Has had intercourse without a condom and her period is 8 days late.  She is perimenopausal but has not taken a pregnancy test.  Will get one today.  Will also recheck for HIV after possible std exposure for which she was on antivirals.      Today's PHQ-2 Score:   PHQ-2 ( 1999 Pfizer) 12/29/2020   Q1: Little interest or pleasure in doing things 0   Q2: Feeling down, depressed or hopeless 0   PHQ-2 Score 0   Q1: Little interest or pleasure in doing things Not at all   Q2: Feeling down, depressed or hopeless Not at all   PHQ-2 Score 0       Abuse: Current or Past (Physical, Sexual or Emotional) - No  Do you feel safe in your environment? Yes      Social History     Tobacco Use     Smoking status: Never Smoker     Smokeless tobacco: Never Used   Substance Use Topics     Alcohol use: Yes     Alcohol/week: 0.0 standard drinks     Comment: < 1 drink / week     If you drink alcohol do you typically have >3 drinks per day or >7 drinks per week? No    Alcohol Use 12/29/2020   Prescreen: >3 drinks/day or >7 drinks/week? No   Prescreen: >3 drinks/day or >7 drinks/week? -   No flowsheet data found.    Reviewed orders with patient.  Reviewed health maintenance and updated orders  accordingly - Yes  BP Readings from Last 3 Encounters:   12/29/20 130/78   10/27/20 138/78   10/20/20 (!) 157/107    Wt Readings from Last 3 Encounters:   12/29/20 73.9 kg (163 lb)   10/27/20 74.8 kg (165 lb)   10/22/19 67.1 kg (148 lb)            Recent Labs   Lab Test 10/22/19  1334 01/14/19  0749 12/01/17  0912 12/01/17  0912 05/05/17  1032 02/07/14  1000 02/07/14  1000   LDL 72  --   --   --   --   --  102   HDL 57  --   --   --   --   --  44*   TRIG 107  --   --   --   --   --  122   ALT 19  --   --  25 22   < > 38   CR 0.55 0.65   < >  --  0.42*   < > 0.50*   GFRESTIMATED >90 >90   < >  --  >90  Non  GFR Calc     < > >90   GFRESTBLACK >90 >90   < >  --  >90   GFR Calc     < > >90   POTASSIUM 3.8 3.8   < >  --  4.0   < > 4.1   TSH 1.34 2.21   < >  --   --    < > 1.65    < > = values in this interval not displayed.        Mammogram Screening: Patient under age 50, mutual decision reflected in health maintenance.      Pertinent mammograms are reviewed under the imaging tab.  History of abnormal Pap smear: NO - age 30-65 PAP every 5 years with negative HPV co-testing recommended  PAP / HPV Latest Ref Rng & Units 10/27/2020 6/2/2016 7/26/2012   PAP - NIL NIL NIL   HPV 16 DNA NEG:Negative Negative Negative -   HPV 18 DNA NEG:Negative Negative Negative -   OTHER HR HPV NEG:Negative Negative Negative -     Reviewed and updated as needed this visit by clinical staff  Tobacco  Allergies  Meds  Problems  Med Hx  Surg Hx  Fam Hx  Soc Hx          Reviewed and updated as needed this visit by Provider  Tobacco  Allergies  Meds  Problems  Med Hx  Surg Hx  Fam Hx           Review of Systems   Constitutional: Negative for chills and fever.   HENT: Negative for congestion, ear pain, hearing loss and sore throat.    Eyes: Negative for pain and visual disturbance.   Respiratory: Negative for cough and shortness of breath.    Cardiovascular: Negative for chest pain, palpitations and  "peripheral edema.   Gastrointestinal: Negative for abdominal pain, constipation, diarrhea, heartburn, hematochezia and nausea.   Breasts:  Negative for tenderness, breast mass and discharge.   Genitourinary: Negative for dysuria, frequency, genital sores, hematuria, pelvic pain, urgency, vaginal bleeding and vaginal discharge.   Musculoskeletal: Negative for arthralgias, joint swelling and myalgias.   Skin: Negative for rash.   Neurological: Negative for dizziness, weakness, headaches and paresthesias.   Psychiatric/Behavioral: Negative for mood changes. The patient is nervous/anxious.         OBJECTIVE:   /78 (BP Location: Right arm, Patient Position: Sitting, Cuff Size: Adult Regular)   Pulse 80   Temp 97.4  F (36.3  C) (Oral)   Resp 20   Ht 1.575 m (5' 2\")   Wt 73.9 kg (163 lb)   SpO2 98%   BMI 29.81 kg/m    Physical Exam  GENERAL: healthy, alert and no distress  EYES: Eyes grossly normal to inspection, PERRL and conjunctivae and sclerae normal  HENT: ear canals and TM's normal, nose and mouth without ulcers or lesions  NECK: no adenopathy, no asymmetry, masses, or scars and thyroid normal to palpation  RESP: lungs clear to auscultation - no rales, rhonchi or wheezes  CV: regular rate and rhythm, normal S1 S2, no S3 or S4, no murmur, click or rub, no peripheral edema and peripheral pulses strong  ABDOMEN: soft, nontender, no hepatosplenomegaly, no masses and bowel sounds normal  MS: no gross musculoskeletal defects noted, no edema  SKIN: no suspicious lesions or rashes  NEURO: Normal strength and tone, mentation intact and speech normal  PSYCH: mentation appears normal, affect normal/bright    Diagnostic Test Results:  No results found for this or any previous visit (from the past 24 hour(s)).    ASSESSMENT/PLAN:       ICD-10-CM    1. Routine history and physical examination of adult  Z00.00 Basic metabolic panel     Lipid panel reflex to direct LDL Non-fasting     CBC with platelets   2. Benign " "essential hypertension  I10 amLODIPine-benazepril (LOTREL) 5-10 MG capsule   3. Bipolar disorder, in full remission, most recent episode depressed (H)  F31.76 lithium (ESKALITH) 600 MG capsule     traZODone (DESYREL) 50 MG tablet     Lithium level     TSH with free T4 reflex   4. Vitamin D deficiency  E55.9 Vitamin D Deficiency   5. Visit for screening mammogram  Z12.31 MA Screening Digital Bilateral     Will need thyroid and lithium level checked in 6 months as well as follow up virtual visit in 1 month to see how her mood and behavior is doing.    Patient has been advised of split billing requirements and indicates understanding: Yes  COUNSELING:  Reviewed preventive health counseling, as reflected in patient instructions    Estimated body mass index is 29.81 kg/m  as calculated from the following:    Height as of this encounter: 1.575 m (5' 2\").    Weight as of this encounter: 73.9 kg (163 lb).    Weight management plan: Discussed healthy diet and exercise guidelines    She reports that she has never smoked. She has never used smokeless tobacco.      Counseling Resources:  ATP IV Guidelines  Pooled Cohorts Equation Calculator  Breast Cancer Risk Calculator  BRCA-Related Cancer Risk Assessment: FHS-7 Tool  FRAX Risk Assessment  ICSI Preventive Guidelines  Dietary Guidelines for Americans, 2010  USDA's MyPlate  ASA Prophylaxis  Lung CA Screening    KYLEE [facility]  "

## 2020-12-30 LAB — HIV 1+2 AB+HIV1 P24 AG SERPL QL IA: NONREACTIVE

## 2021-01-25 ENCOUNTER — VIRTUAL VISIT (OUTPATIENT)
Dept: FAMILY MEDICINE | Facility: CLINIC | Age: 46
End: 2021-01-25
Payer: COMMERCIAL

## 2021-01-25 DIAGNOSIS — F31.76 BIPOLAR DISORDER, IN FULL REMISSION, MOST RECENT EPISODE DEPRESSED (H): Primary | ICD-10-CM

## 2021-01-25 DIAGNOSIS — F43.21 GRIEF REACTION: ICD-10-CM

## 2021-01-25 PROCEDURE — 99213 OFFICE O/P EST LOW 20 MIN: CPT | Mod: 95 | Performed by: PHYSICIAN ASSISTANT

## 2021-01-25 RX ORDER — TRAZODONE HYDROCHLORIDE 50 MG/1
25-50 TABLET, FILM COATED ORAL
Qty: 90 TABLET | Refills: 3 | Status: SHIPPED | OUTPATIENT
Start: 2021-01-25 | End: 2021-12-28

## 2021-01-25 NOTE — PROGRESS NOTES
Mable is a 45 year old who is being evaluated via a billable video visit.      How would you like to obtain your AVS? MyChart  If the video visit is dropped, the invitation should be resent by: Text to cell phone: [phone]  Will anyone else be joining your video visit? No    Video Start Time: 9:21 AM  Assessment & Plan     Bipolar disorder, in full remission, most recent episode depressed (H)  Did not restart lithium.  Has been going to support groups for alcohol use, codependency and SA.  She has been found these to be helpful.  Her mood was doing well prior to the death of her son over the last weekend.  She has not had any manic episodes.  She is grieving but feels at peace that her son who suffered from epilepsy  in his sleep without pain and is no longer dealing with his physical suffering.  Pt can't find trazodone so will refill that for her.  - traZODone (DESYREL) 50 MG tablet; Take 0.5-1 tablets (25-50 mg) by mouth nightly as needed for sleep    Grief reaction  Offered mental health referral.  Pt has family therapist who did reach out to her who she may schedule with if she needs.      Return in about 3 months (around 2021) for Recheck if not improving.    [facility]    Tyler Akins is a 45 year old who presents to clinic today for the following health issues     HPI       Patient would like to discuss medications. See for physical 1 month ago, had stopped her lithium and was having some more manic behaviors including making some unwise choices with sexual partners.  We decided to restart her on a lower dose than previous.  Also increased her trazodone dose because she was having trouble sleeping.  Pt forgot about today's appointment.    Son passed away last weekend. Did not do phq9 for this reason. Had a history of epilepsy.  in his sleep.  Wasn't there when it happened.  Just went through of divorce as well.     Never started  taking the lithium but is in recovery 12 step groups for alcohol and co dependence which are helping.  Is also in sex anonomous.  No nir at work, sleeping soundly at night.  Cannot find her trazodone.    Family therapists reached out to her.  Did not make an appointment but will if she needs to.         Review of Systems   Constitutional, HEENT, cardiovascular, pulmonary, GI, , musculoskeletal, neuro, skin, endocrine and psych systems are negative, except as otherwise noted.      Objective           Vitals:  No vitals were obtained today due to virtual visit.    Physical Exam   GENERAL: healthy, alert and mild emotional distress  EYES: Eyes grossly normal to inspection.  No discharge or erythema, or obvious scleral/conjunctival abnormalities.  RESP: No audible wheeze, cough, or visible cyanosis.  No visible retractions or increased work of breathing.    SKIN: Visible skin clear. No significant rash, abnormal pigmentation or lesions.  NEURO: Cranial nerves grossly intact.  Mentation and speech appropriate for age.  PSYCH: mentation appears normal, affect flat, tearful, judgement and insight impaired and appearance well groomed            Video-Visit Details    Type of service:  Video Visit    Video End Time:9:35 AM    Originating Location (pt. Location): Home    Distant Location (provider location):  [facility]     Platform used for Video Visit: EnglishUp

## 2021-05-09 ENCOUNTER — HEALTH MAINTENANCE LETTER (OUTPATIENT)
Age: 46
End: 2021-05-09

## 2021-10-02 ENCOUNTER — MYC MEDICAL ADVICE (OUTPATIENT)
Dept: FAMILY MEDICINE | Facility: CLINIC | Age: 46
End: 2021-10-02

## 2021-10-02 DIAGNOSIS — L40.8 OTHER PSORIASIS: Primary | ICD-10-CM

## 2021-10-04 DIAGNOSIS — L40.9 PSORIASIS: Primary | ICD-10-CM

## 2021-10-12 ENCOUNTER — HOSPITAL ENCOUNTER (OUTPATIENT)
Dept: MAMMOGRAPHY | Facility: CLINIC | Age: 46
Discharge: HOME | End: 2021-10-12
Attending: PHYSICIAN ASSISTANT | Admitting: PHYSICIAN ASSISTANT
Payer: COMMERCIAL

## 2021-10-12 DIAGNOSIS — Z12.31 VISIT FOR SCREENING MAMMOGRAM: ICD-10-CM

## 2021-10-12 PROCEDURE — 77063 BREAST TOMOSYNTHESIS BI: CPT

## 2021-10-21 ENCOUNTER — HOSPITAL ENCOUNTER (OUTPATIENT)
Dept: MAMMOGRAPHY | Facility: CLINIC | Age: 46
End: 2021-10-21
Attending: PHYSICIAN ASSISTANT
Payer: COMMERCIAL

## 2021-10-21 DIAGNOSIS — R92.8 ABNORMAL MAMMOGRAM: ICD-10-CM

## 2021-10-21 PROCEDURE — 76642 ULTRASOUND BREAST LIMITED: CPT | Mod: 50

## 2021-10-21 PROCEDURE — 77062 BREAST TOMOSYNTHESIS BI: CPT

## 2021-10-24 ENCOUNTER — HEALTH MAINTENANCE LETTER (OUTPATIENT)
Age: 46
End: 2021-10-24

## 2021-12-28 ENCOUNTER — OFFICE VISIT (OUTPATIENT)
Dept: FAMILY MEDICINE | Facility: CLINIC | Age: 46
End: 2021-12-28
Payer: COMMERCIAL

## 2021-12-28 VITALS
HEIGHT: 62 IN | SYSTOLIC BLOOD PRESSURE: 120 MMHG | DIASTOLIC BLOOD PRESSURE: 82 MMHG | RESPIRATION RATE: 18 BRPM | HEART RATE: 64 BPM | OXYGEN SATURATION: 97 % | TEMPERATURE: 97.9 F | BODY MASS INDEX: 29.53 KG/M2 | WEIGHT: 160.5 LBS

## 2021-12-28 DIAGNOSIS — Z23 NEED FOR VACCINATION: ICD-10-CM

## 2021-12-28 DIAGNOSIS — E55.9 VITAMIN D DEFICIENCY: ICD-10-CM

## 2021-12-28 DIAGNOSIS — Z71.89 ADVANCED CARE PLANNING/COUNSELING DISCUSSION: ICD-10-CM

## 2021-12-28 DIAGNOSIS — I10 BENIGN ESSENTIAL HYPERTENSION: ICD-10-CM

## 2021-12-28 DIAGNOSIS — N95.1 MENOPAUSAL SYNDROME (HOT FLASHES): ICD-10-CM

## 2021-12-28 DIAGNOSIS — F31.76 BIPOLAR DISORDER, IN FULL REMISSION, MOST RECENT EPISODE DEPRESSED (H): ICD-10-CM

## 2021-12-28 DIAGNOSIS — Z12.11 SCREENING FOR COLON CANCER: ICD-10-CM

## 2021-12-28 DIAGNOSIS — Z00.00 ROUTINE HISTORY AND PHYSICAL EXAMINATION OF ADULT: Primary | ICD-10-CM

## 2021-12-28 DIAGNOSIS — L40.8 OTHER PSORIASIS: ICD-10-CM

## 2021-12-28 LAB
ALBUMIN SERPL-MCNC: 3.4 G/DL (ref 3.4–5)
ALP SERPL-CCNC: 75 U/L (ref 40–150)
ALT SERPL W P-5'-P-CCNC: 23 U/L (ref 0–50)
ANION GAP SERPL CALCULATED.3IONS-SCNC: 6 MMOL/L (ref 3–14)
AST SERPL W P-5'-P-CCNC: 18 U/L (ref 0–45)
BILIRUB SERPL-MCNC: 0.5 MG/DL (ref 0.2–1.3)
BUN SERPL-MCNC: 13 MG/DL (ref 7–30)
CALCIUM SERPL-MCNC: 8.5 MG/DL (ref 8.5–10.1)
CHLORIDE BLD-SCNC: 107 MMOL/L (ref 94–109)
CHOLEST SERPL-MCNC: 177 MG/DL
CO2 SERPL-SCNC: 25 MMOL/L (ref 20–32)
CREAT SERPL-MCNC: 0.55 MG/DL (ref 0.52–1.04)
DEPRECATED CALCIDIOL+CALCIFEROL SERPL-MC: 29 UG/L (ref 20–75)
ERYTHROCYTE [DISTWIDTH] IN BLOOD BY AUTOMATED COUNT: 12.1 % (ref 10–15)
FASTING STATUS PATIENT QL REPORTED: NO
FSH SERPL-ACNC: 15.9 IU/L
GFR SERPL CREATININE-BSD FRML MDRD: >90 ML/MIN/1.73M2
GLUCOSE BLD-MCNC: 81 MG/DL (ref 70–99)
HCT VFR BLD AUTO: 43.6 % (ref 35–47)
HDLC SERPL-MCNC: 59 MG/DL
HGB BLD-MCNC: 14.1 G/DL (ref 11.7–15.7)
LDLC SERPL CALC-MCNC: 97 MG/DL
LH SERPL-ACNC: 30.5 IU/L
MCH RBC QN AUTO: 28.9 PG (ref 26.5–33)
MCHC RBC AUTO-ENTMCNC: 32.3 G/DL (ref 31.5–36.5)
MCV RBC AUTO: 89 FL (ref 78–100)
NONHDLC SERPL-MCNC: 118 MG/DL
PLATELET # BLD AUTO: 332 10E3/UL (ref 150–450)
POTASSIUM BLD-SCNC: 4.1 MMOL/L (ref 3.4–5.3)
PROT SERPL-MCNC: 7.9 G/DL (ref 6.8–8.8)
RBC # BLD AUTO: 4.88 10E6/UL (ref 3.8–5.2)
SODIUM SERPL-SCNC: 138 MMOL/L (ref 133–144)
TRIGL SERPL-MCNC: 106 MG/DL
WBC # BLD AUTO: 7.2 10E3/UL (ref 4–11)

## 2021-12-28 PROCEDURE — 83001 ASSAY OF GONADOTROPIN (FSH): CPT | Performed by: PHYSICIAN ASSISTANT

## 2021-12-28 PROCEDURE — 99396 PREV VISIT EST AGE 40-64: CPT | Mod: 25 | Performed by: PHYSICIAN ASSISTANT

## 2021-12-28 PROCEDURE — 82306 VITAMIN D 25 HYDROXY: CPT | Performed by: PHYSICIAN ASSISTANT

## 2021-12-28 PROCEDURE — 85027 COMPLETE CBC AUTOMATED: CPT | Performed by: PHYSICIAN ASSISTANT

## 2021-12-28 PROCEDURE — 80053 COMPREHEN METABOLIC PANEL: CPT | Performed by: PHYSICIAN ASSISTANT

## 2021-12-28 PROCEDURE — 90471 IMMUNIZATION ADMIN: CPT | Performed by: PHYSICIAN ASSISTANT

## 2021-12-28 PROCEDURE — 83002 ASSAY OF GONADOTROPIN (LH): CPT | Performed by: PHYSICIAN ASSISTANT

## 2021-12-28 PROCEDURE — 90686 IIV4 VACC NO PRSV 0.5 ML IM: CPT | Performed by: PHYSICIAN ASSISTANT

## 2021-12-28 PROCEDURE — 80061 LIPID PANEL: CPT | Performed by: PHYSICIAN ASSISTANT

## 2021-12-28 PROCEDURE — 36415 COLL VENOUS BLD VENIPUNCTURE: CPT | Performed by: PHYSICIAN ASSISTANT

## 2021-12-28 RX ORDER — CLOBETASOL PROPIONATE 0.5 MG/G
OINTMENT TOPICAL 2 TIMES DAILY PRN
Qty: 60 G | Refills: 11 | Status: CANCELLED | OUTPATIENT
Start: 2021-12-28

## 2021-12-28 RX ORDER — TRAZODONE HYDROCHLORIDE 50 MG/1
25-50 TABLET, FILM COATED ORAL
Qty: 90 TABLET | Refills: 3 | Status: CANCELLED | OUTPATIENT
Start: 2021-12-28

## 2021-12-28 RX ORDER — AMLODIPINE AND BENAZEPRIL HYDROCHLORIDE 5; 10 MG/1; MG/1
1 CAPSULE ORAL DAILY
Qty: 90 CAPSULE | Refills: 1 | Status: CANCELLED | OUTPATIENT
Start: 2021-12-28

## 2021-12-28 ASSESSMENT — ENCOUNTER SYMPTOMS
HEARTBURN: 0
DYSURIA: 0
DIZZINESS: 0
BREAST MASS: 0
WEAKNESS: 0
PALPITATIONS: 0
FREQUENCY: 0
HEADACHES: 0
JOINT SWELLING: 0
FEVER: 0
SORE THROAT: 0
PARESTHESIAS: 0
EYE PAIN: 0
CHILLS: 0
NERVOUS/ANXIOUS: 0
ARTHRALGIAS: 0
NAUSEA: 0
ABDOMINAL PAIN: 0
SHORTNESS OF BREATH: 0
COUGH: 0
HEMATURIA: 0
MYALGIAS: 0
HEMATOCHEZIA: 0
CONSTIPATION: 0
DIARRHEA: 0

## 2021-12-28 ASSESSMENT — MIFFLIN-ST. JEOR: SCORE: 1311.27
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
## 2021-12-29 NOTE — RESULT ENCOUNTER NOTE
Carson Akins,    I just wanted to let you know that your lab results have been reviewed and are attached.    - Your lab results look stable; everything is normal.  - Female hormones (FSH and LH) are in the normal range and show you are not menopausal.    Please let me know if you have any questions and have a great week!    Sincerely,    Qiana Schwartz PA-C    [facility]  [address] [place] [address]  Clinic Phone: [phone]

## 2022-01-21 ENCOUNTER — HOSPITAL ENCOUNTER (OUTPATIENT)
Facility: CLINIC | Age: 47
End: 2022-01-21
Attending: INTERNAL MEDICINE | Admitting: INTERNAL MEDICINE
Payer: COMMERCIAL

## 2022-01-21 ENCOUNTER — TELEPHONE (OUTPATIENT)
Dept: GASTROENTEROLOGY | Facility: CLINIC | Age: 47
End: 2022-01-21
Payer: COMMERCIAL

## 2022-01-21 DIAGNOSIS — Z11.59 ENCOUNTER FOR SCREENING FOR OTHER VIRAL DISEASES: Primary | ICD-10-CM

## 2022-01-21 NOTE — TELEPHONE ENCOUNTER
Screening Questions  Blue=prep questions Red=location Green=sedation   1. Are you active on mychart? y    2. What insurance is in the chart? Media    3.  Ordering/Referring Provider: Efren    4. BMI 28.3 If greater than 40 review exclusion criteria also will need EXTENDED PREP    5.  Respiratory Screening (If yes to any of the following HOSPITAL setting only):     Do you use daily home oxygen? n  Do you have mod to severe Obstructive Sleep Apnea? n (can be seen at [facility] or hospital setting)    Do you have Pulmonary Hypertension? n   Do you have UNCONTROLLED asthma? n    6. Have you had a heart or lung transplant? n  (If yes, please review exclusion criteria)    7. Are you currently on dialysis?n  (If yes, schedule in HOSPITAL setting only)(If yes, please send Golytely prep)    8. Do you have chronic kidney disease? n (If yes, please send Golytely prep)    9. Have you had a stroke or Transient ischemic attack (TIA) within 6 months? n (If yes, do not schedule at [facility])    10. In the past 6 months, have you had any heart related issues including cardiomyopathy or heart attack? n (If yes, please review exclusion criteria)           If yes, did it require cardiac stenting or other implantable device?n  (If yes, please review exclusion criteria)      11. Do you have any implantable devices in your body (pacemaker, defib, LVAD)? n (If yes, schedule at [facility])    12. Do you take nitroglycerin? If yes, how often? n (if yes, schedule at HOSPITAL setting)    13. Are you currently taking any blood thinners?n (If yes- inform patient to follow up with PCP or provider for follow up instructions)     14. Are you a diabetic? n (If yes, please send Golytely prep)    15. (Females) Are you currently pregnant?   If yes, how many weeks?      16. Are you taking any prescription pain medications on a routine schedule? n If yes, MAC sedation and patient will need EXTENDED PREP.    17. Do you have any chemical dependencies such as alcohol,  street drugs, or methadone? n If yes, MAC sedation     18. Do you have any history of post-traumatic stress syndrome, severe anxiety or history of psychosis? n  If yes, MAC sedation.     19. Do you transfer independently? y    20.  Do you have any issues with constipation? n   If yes, pt will need EXTENDED PREP     21. Preferred Pharmacy for Pre Prescription [id number] - [place], [place] - [address] AT [address] [place] [address]**    Scheduling Details    Which Colonoscopy Prep was Sent?: Miralax  Type of Procedure Scheduled: Colonoscopy  Surgeon: Nain  Date of Procedure: 2/1/22  Location:   Caller (Please ask for phone number if not scheduled by patient): Everette Mayen        Sedation Type: CS  Conscious Sedation- Needs  for 6 hours after the procedure  MAC/General-Needs  for 24 hours after procedure    Pre-op Required at [facility], [place], [place] and OR for MAC sedation: n  (if yes advise patient they will need a pre-op prior to procedure)      Informed patient they will need an adult  y  Cannot take any type of public or medical transportation alone    Pre-Procedure Covid test to be completed at [facility] or Externally: y    Confirmed Nurse will call to complete assessment y    Additional comments:  ([facility] PATIENTS NEED EXTENDED PREP)

## 2022-02-18 NOTE — TELEPHONE ENCOUNTER
Health Maintenance Due   Topic Date Due   • Shingles Vaccine (1 of 2) Never done   • Influenza Vaccine (1) 09/01/2021   • COVID-19 Vaccine (3 - Booster for Pfizer series) 09/05/2021   • Colorectal Cancer Screen-  10/28/2021       Patient is due for topics as listed above but is not proceeding with Immunizations at this time.   I released the orders.   Please let them know.

## 2022-03-25 ENCOUNTER — LAB REQUISITION (OUTPATIENT)
Dept: LAB | Facility: CLINIC | Age: 47
End: 2022-03-25
Payer: COMMERCIAL

## 2022-03-25 DIAGNOSIS — Z79.899 OTHER LONG TERM (CURRENT) DRUG THERAPY: ICD-10-CM

## 2022-03-25 DIAGNOSIS — L40.0 PSORIASIS VULGARIS: ICD-10-CM

## 2022-03-25 LAB
ALBUMIN SERPL-MCNC: 3.2 G/DL (ref 3.4–5)
ALP SERPL-CCNC: 79 U/L (ref 40–150)
ALT SERPL W P-5'-P-CCNC: 18 U/L (ref 0–50)
AST SERPL W P-5'-P-CCNC: 17 U/L (ref 0–45)
BASOPHILS # BLD AUTO: 0.1 10E3/UL (ref 0–0.2)
BASOPHILS NFR BLD AUTO: 1 %
BILIRUB DIRECT SERPL-MCNC: <0.1 MG/DL (ref 0–0.2)
BILIRUB SERPL-MCNC: 0.3 MG/DL (ref 0.2–1.3)
EOSINOPHIL # BLD AUTO: 0.2 10E3/UL (ref 0–0.7)
EOSINOPHIL NFR BLD AUTO: 3 %
ERYTHROCYTE [DISTWIDTH] IN BLOOD BY AUTOMATED COUNT: 12.1 % (ref 10–15)
HCT VFR BLD AUTO: 42.1 % (ref 35–47)
HGB BLD-MCNC: 13.1 G/DL (ref 11.7–15.7)
IMM GRANULOCYTES # BLD: 0 10E3/UL
IMM GRANULOCYTES NFR BLD: 0 %
LYMPHOCYTES # BLD AUTO: 2.1 10E3/UL (ref 0.8–5.3)
LYMPHOCYTES NFR BLD AUTO: 28 %
MCH RBC QN AUTO: 28.5 PG (ref 26.5–33)
MCHC RBC AUTO-ENTMCNC: 31.1 G/DL (ref 31.5–36.5)
MCV RBC AUTO: 92 FL (ref 78–100)
MONOCYTES # BLD AUTO: 0.7 10E3/UL (ref 0–1.3)
MONOCYTES NFR BLD AUTO: 9 %
NEUTROPHILS # BLD AUTO: 4.5 10E3/UL (ref 1.6–8.3)
NEUTROPHILS NFR BLD AUTO: 59 %
NRBC # BLD AUTO: 0 10E3/UL
NRBC BLD AUTO-RTO: 0 /100
PLATELET # BLD AUTO: 410 10E3/UL (ref 150–450)
PROT SERPL-MCNC: 7.6 G/DL (ref 6.8–8.8)
RBC # BLD AUTO: 4.6 10E6/UL (ref 3.8–5.2)
WBC # BLD AUTO: 7.6 10E3/UL (ref 4–11)

## 2022-03-25 PROCEDURE — 86481 TB AG RESPONSE T-CELL SUSP: CPT | Mod: ORL | Performed by: DERMATOLOGY

## 2022-03-25 PROCEDURE — 86803 HEPATITIS C AB TEST: CPT | Mod: ORL | Performed by: DERMATOLOGY

## 2022-03-25 PROCEDURE — 80076 HEPATIC FUNCTION PANEL: CPT | Mod: ORL | Performed by: DERMATOLOGY

## 2022-03-25 PROCEDURE — 85025 COMPLETE CBC W/AUTO DIFF WBC: CPT | Mod: ORL | Performed by: DERMATOLOGY

## 2022-03-25 PROCEDURE — 87340 HEPATITIS B SURFACE AG IA: CPT | Mod: ORL | Performed by: DERMATOLOGY

## 2022-03-25 PROCEDURE — 87389 HIV-1 AG W/HIV-1&-2 AB AG IA: CPT | Mod: ORL | Performed by: DERMATOLOGY

## 2022-03-25 PROCEDURE — 36415 COLL VENOUS BLD VENIPUNCTURE: CPT | Mod: ORL | Performed by: DERMATOLOGY

## 2022-03-28 LAB
GAMMA INTERFERON BACKGROUND BLD IA-ACNC: 0.57 IU/ML
HBV SURFACE AG SERPL QL IA: NONREACTIVE
HCV AB SERPL QL IA: NONREACTIVE
HIV 1+2 AB+HIV1 P24 AG SERPL QL IA: NONREACTIVE
M TB IFN-G BLD-IMP: NEGATIVE
M TB IFN-G CD4+ BCKGRND COR BLD-ACNC: 9.43 IU/ML
MITOGEN IGNF BCKGRD COR BLD-ACNC: -0.1 IU/ML
MITOGEN IGNF BCKGRD COR BLD-ACNC: -0.14 IU/ML
QUANTIFERON MITOGEN: 10 IU/ML
QUANTIFERON NIL TUBE: 0.57 IU/ML
QUANTIFERON TB1 TUBE: 0.43 IU/ML
QUANTIFERON TB2 TUBE: 0.47

## 2022-10-15 ENCOUNTER — HEALTH MAINTENANCE LETTER (OUTPATIENT)
Age: 47
End: 2022-10-15

## 2022-11-22 ENCOUNTER — OFFICE VISIT (OUTPATIENT)
Dept: FAMILY MEDICINE | Facility: CLINIC | Age: 47
End: 2022-11-22
Payer: COMMERCIAL

## 2022-11-22 VITALS
WEIGHT: 161.3 LBS | TEMPERATURE: 97.7 F | HEIGHT: 62 IN | BODY MASS INDEX: 29.68 KG/M2 | SYSTOLIC BLOOD PRESSURE: 121 MMHG | DIASTOLIC BLOOD PRESSURE: 83 MMHG | RESPIRATION RATE: 20 BRPM | HEART RATE: 77 BPM

## 2022-11-22 DIAGNOSIS — I10 BENIGN ESSENTIAL HYPERTENSION: ICD-10-CM

## 2022-11-22 DIAGNOSIS — Z41.9 ELECTIVE SURGICAL PROCEDURE: ICD-10-CM

## 2022-11-22 DIAGNOSIS — Z12.31 VISIT FOR SCREENING MAMMOGRAM: ICD-10-CM

## 2022-11-22 DIAGNOSIS — Z01.818 PREOP GENERAL PHYSICAL EXAM: Primary | ICD-10-CM

## 2022-11-22 DIAGNOSIS — Z23 HIGH PRIORITY FOR 2019-NCOV VACCINE: ICD-10-CM

## 2022-11-22 DIAGNOSIS — F31.76 BIPOLAR DISORDER, IN FULL REMISSION, MOST RECENT EPISODE DEPRESSED (H): ICD-10-CM

## 2022-11-22 DIAGNOSIS — L40.8 OTHER PSORIASIS: ICD-10-CM

## 2022-11-22 LAB
ERYTHROCYTE [DISTWIDTH] IN BLOOD BY AUTOMATED COUNT: 12.1 % (ref 10–15)
HCT VFR BLD AUTO: 41.6 % (ref 35–47)
HGB BLD-MCNC: 13.7 G/DL (ref 11.7–15.7)
MCH RBC QN AUTO: 29.1 PG (ref 26.5–33)
MCHC RBC AUTO-ENTMCNC: 32.9 G/DL (ref 31.5–36.5)
MCV RBC AUTO: 88 FL (ref 78–100)
PLATELET # BLD AUTO: 365 10E3/UL (ref 150–450)
RBC # BLD AUTO: 4.71 10E6/UL (ref 3.8–5.2)
WBC # BLD AUTO: 9.8 10E3/UL (ref 4–11)

## 2022-11-22 PROCEDURE — 90471 IMMUNIZATION ADMIN: CPT | Performed by: PHYSICIAN ASSISTANT

## 2022-11-22 PROCEDURE — 36415 COLL VENOUS BLD VENIPUNCTURE: CPT | Performed by: PHYSICIAN ASSISTANT

## 2022-11-22 PROCEDURE — 91313 COVID-19 VACCINE BIVALENT BOOSTER 18+ (MODERNA): CPT | Performed by: PHYSICIAN ASSISTANT

## 2022-11-22 PROCEDURE — 80048 BASIC METABOLIC PNL TOTAL CA: CPT | Performed by: PHYSICIAN ASSISTANT

## 2022-11-22 PROCEDURE — 0134A COVID-19 VACCINE BIVALENT BOOSTER 18+ (MODERNA): CPT | Performed by: PHYSICIAN ASSISTANT

## 2022-11-22 PROCEDURE — 85027 COMPLETE CBC AUTOMATED: CPT | Performed by: PHYSICIAN ASSISTANT

## 2022-11-22 PROCEDURE — 99214 OFFICE O/P EST MOD 30 MIN: CPT | Mod: 25 | Performed by: PHYSICIAN ASSISTANT

## 2022-11-22 PROCEDURE — 90686 IIV4 VACC NO PRSV 0.5 ML IM: CPT | Performed by: PHYSICIAN ASSISTANT

## 2022-11-22 ASSESSMENT — PAIN SCALES - GENERAL: PAINLEVEL: NO PAIN (0)

## 2022-11-22 NOTE — PATIENT INSTRUCTIONS
Stop all NSAIDs (motrin, ibuprofen, naproxen, aleve, advil, naprosyn)  for at least 5 days prior to surgery.    Go off humira 14 days prior to surgery.    Take  your amlodipine-benazepril after surgery the day of your procedure

## 2022-11-22 NOTE — PROGRESS NOTES
[address], [address]  [facility] [address]  Phone: [phone]  Primary Provider: Katelynn Schwartz  Pre-op Performing Provider: BELKYS RAMIREZ      PREOPERATIVE EVALUATION:  Today's date: 11/22/2022    Everette Mayen is a 47 year old female who presents for a preoperative evaluation.    Surgical Information:  Surgery/Procedure: Abdominoplasty   Surgery Location: [facility]  Surgeon: Dr. Meggan Coppola  Surgery Date: 12/20/22  Time of Surgery: 11:30 AM  Where patient plans to recover: At home with family  Fax number for surgical facility: [phone]    Type of Anesthesia Anticipated: to be determined    Assessment & Plan     The proposed surgical procedure is considered INTERMEDIATE risk.    (Z01.818) Preop general physical exam  (primary encounter diagnosis)  Comment: Not on blood thinners, no history of DM, able to do 4 METS  Plan: CBC with platelets, Basic metabolic panel  (Ca,        Cl, CO2, Creat, Gluc, K, Na, BUN)  Cleared for surgery    (Z41.9) Elective surgical procedure  Comment: Abdominoplasty  Plan:     (I10) Benign essential hypertension  Comment: Well-controlled on amlodipine benazepril  Plan: Hold the morning of surgery, take after the procedure    (L40.8)  PSORIASIS currently on Humira  Comment: On Humira  Plan: We will hold 14 days prior to surgery    (F31.76) Bipolar disorder, in full remission, most recent episode depressed (H)  Comment:   Plan:     (Z12.31) Visit for screening mammogram  Comment:   Plan: MA SCREENING DIGITAL BILAT - Future  (s+30)    (Z23) High priority for 2019-nCoV vaccine  Comment:   Plan: COVID-19,PF,MODERNA BIVALENT 18+Yrs    Risks and Recommendations:  The patient has the following additional risks and recommendations for perioperative complications:   - No identified additional risk factors other than previously addressed    Medication Instructions:  Stop all NSAIDs (motrin,  ibuprofen, naproxen, aleve, advil, naprosyn)  for at least 5 days prior to surgery.    Go off humira 14 days prior to surgery.    Take  your amlodipine-benazepril after surgery the day of your procedure    RECOMMENDATION:  APPROVAL GIVEN to proceed with proposed procedure, without further diagnostic evaluation.    Sandra Mayen PA-C  30 minutes on the day of the encounter doing chart review, history and exam, documentation and further activities as noted above.      Subjective     HPI related to upcoming procedure:  Mable is here for pre-op for abdominoplasty.  She will have surgery 12/20/22.    She takes humira for psoriasis.      She generally feels well.  She exercises regularly--she plays tennis, swims, walks and runs.  She denies chest pain or increased dyspnea with exercise.    She denies history of DM, clots, CVA or MI.  Preop Questions 11/22/2022   1. Have you ever had a heart attack or stroke? No   2. Have you ever had surgery on your heart or blood vessels, such as a stent placement, a coronary artery bypass, or surgery on an artery in your head, neck, heart, or legs? No   3. Do you have chest pain with activity? No   4. Do you have a history of  heart failure? No   5. Do you currently have a cold, bronchitis or symptoms of other infection? No   6. Do you have a cough, shortness of breath, or wheezing? No   7. Do you or anyone in your family have previous history of blood clots? No   8. Do you or does anyone in your family have a serious bleeding problem such as prolonged bleeding following surgeries or cuts? No   9. Have you ever had problems with anemia or been told to take iron pills? No   10. Have you had any abnormal blood loss such as black, tarry or bloody stools, or abnormal vaginal bleeding? No   11. Have you ever had a blood transfusion? No   12. Are you willing to have a blood transfusion if it is medically needed before, during, or after your surgery? Yes   13. Have you or any of  your relatives ever had problems with anesthesia? No   14. Do you have sleep apnea, excessive snoring or daytime drowsiness? No   15. Do you have any artifical heart valves or other implanted medical devices like a pacemaker, defibrillator, or continuous glucose monitor? No   16. Do you have artificial joints? No   17. Are you allergic to latex? No   18. Is there any chance that you may be pregnant? No     Health Care Directive:  Patient does not have a Health Care Directive or Living Will: Patient states has Advance Directive and will bring in a copy to clinic.    Review of Systems  CONSTITUTIONAL: NEGATIVE for fever, chills, change in weight  INTEGUMENTARY/SKIN: NEGATIVE for worrisome rashes, moles or lesions  EYES: NEGATIVE for vision changes or irritation  ENT/MOUTH: NEGATIVE for ear, mouth and throat problems  RESP: NEGATIVE for significant cough or SOB  CV: NEGATIVE for chest pain, palpitations or peripheral edema  GI: NEGATIVE for nausea, abdominal pain, heartburn, or change in bowel habits  : NEGATIVE for frequency, dysuria, or hematuria  MUSCULOSKELETAL: NEGATIVE for significant arthralgias or myalgia  NEURO: NEGATIVE for weakness, dizziness or paresthesias  HEME: NEGATIVE for bleeding problems    Patient Active Problem List    Diagnosis Date Noted     Gastritis without bleeding, unspecified chronicity, unspecified gastritis type 10/17/2016     Priority: Medium     Acute left-sided low back pain without sciatica since 10-15 10/17/2016     Priority: Medium     Bipolar disorder, in full remission, most recent episode depressed (H) 10/17/2016     Priority: Medium     Started in 2010, son found to have microcephaly during routine ultrasound, triggered severe depression. Managed by Dr Eli Kaur at [facility]. Sx improved following delivery. Developed another crisis in 2016, started on lithium and trazodone by Dr Kaur; entered remission and has been stable since then.       Benign essential hypertension 03/17/2016      Priority: Medium     Health Care Home 09/28/2015     Priority: Medium     State Tier Level:  unknown  Status:  Accepted  Care Coordinator:  Fransisca Lu    See Letters for [facility] Care Plan  Date:  September 16, 2015           Situational depression 08/31/2015     Priority: Medium     Vitamin D deficiency 06/22/2015     Priority: Medium      PSORIASIS currently on Humira 10/30/2007     Priority: Medium     Allergic rhinitis      Priority: Medium      Past Medical History:   Diagnosis Date     Allergic rhinitis, cause unspecified      Depressive disorder, not elsewhere classified      Hypertension 2008     Other psoriasis      Unspecified complication of pregnancy, unspecified as to episode of care 3/09    Willis's/2nd trimester termination     No past surgical history on file.  Current Outpatient Medications   Medication Sig Dispense Refill     adalimumab (HUMIRA) 40 MG/0.8ML prefilled syringe kit Inject 0.8 mLs (40 mg) Subcutaneous every 14 days 2 each 5     amLODIPine-benazepril (LOTREL) 5-10 MG capsule Take 1 capsule by mouth daily 90 capsule 3     clobetasol (TEMOVATE) 0.05 % external ointment Apply topically 2 times daily as needed (Psoriasis) 60 g 11       Allergies   Allergen Reactions     Sulfa Drugs Hives     Lupus Erythematous     Lamotrigine Rash        Social History     Tobacco Use     Smoking status: Never     Smokeless tobacco: Never   Substance Use Topics     Alcohol use: Yes     Alcohol/week: 0.0 standard drinks     Comment: < 1 drink / week     Family History   Problem Relation Age of Onset     Hypertension Mother      Allergies Mother      Arthritis Mother         psoriasis     Depression Mother      Genitourinary Problems Mother         urinary incontinence     Gynecology Mother         hyst age 48     Alcohol/Drug Father      Depression Father      Diabetes Maternal Grandmother      Eye Disorder Maternal Grandmother      Cerebrovascular Disease Maternal Grandfather      Cardiovascular Paternal  "Grandfather      Other Cancer Other      Genetic Disorder Son         No further genetic testing planned, microcephaly     Seizure Disorder Son          age 11     Melanoma No family hx of      Skin Cancer No family hx of      Coronary Artery Disease No family hx of      Hyperlipidemia No family hx of      Breast Cancer No family hx of      Colon Cancer No family hx of      Prostate Cancer No family hx of      Anxiety Disorder No family hx of      Substance Abuse No family hx of      Anesthesia Reaction No family hx of      Asthma No family hx of      Osteoporosis No family hx of      Thyroid Disease No family hx of      History   Drug Use No         Objective     /83 (BP Location: Left arm, Patient Position: Sitting, Cuff Size: Adult Regular)   Pulse 77   Temp 97.7  F (36.5  C) (Temporal)   Resp 20   Ht 1.575 m (5' 2\")   Wt 73.2 kg (161 lb 4.8 oz)   BMI 29.50 kg/m      LMP 2021    Physical Exam      GENERAL APPEARANCE: healthy, alert and no distress     EYES: no scleral icterus     HENT: OP clear mouth without ulcers or lesions     RESP: lungs clear to auscultation - no rales, rhonchi or wheezes     CV: regular rates and rhythm, normal S1 S2, no S3 or S4 and no murmur, click or rub     ABDOMEN:  soft, nontender, no HSM or masses and bowel sounds normal     MS: extremities normal- no gross deformities noted, no edema     NEURO: Normal mentation, gait and speechnormal     PSYCH: mentation appears normal. and affect normal/bright      Recent Labs   Lab Test 22  1229 21  1357   HGB 13.1 14.1    332   NA  --  138   POTASSIUM  --  4.1   CR  --  0.55        Diagnostics:  Labs pending at this time.  Results will be reviewed when available.   No EKG required, no history of coronary heart disease, significant arrhythmia, peripheral arterial disease or other structural heart disease.    Revised Cardiac Risk Index (RCRI):  The patient has the following serious cardiovascular risks for " perioperative complications:   - No serious cardiac risks = 0 points     RCRI Interpretation: 0 points: Class I (very low risk - 0.4% complication rate)           Signed Electronically by: Sandra Mayen PA-C  Copy of this evaluation report is provided to requesting physician.

## 2022-11-23 LAB
ANION GAP SERPL CALCULATED.3IONS-SCNC: 12 MMOL/L (ref 7–15)
BUN SERPL-MCNC: 14.7 MG/DL (ref 6–20)
CALCIUM SERPL-MCNC: 8.5 MG/DL (ref 8.6–10)
CHLORIDE SERPL-SCNC: 103 MMOL/L (ref 98–107)
CREAT SERPL-MCNC: 0.65 MG/DL (ref 0.51–0.95)
DEPRECATED HCO3 PLAS-SCNC: 23 MMOL/L (ref 22–29)
GFR SERPL CREATININE-BSD FRML MDRD: >90 ML/MIN/1.73M2
GLUCOSE SERPL-MCNC: 84 MG/DL (ref 70–99)
POTASSIUM SERPL-SCNC: 4.1 MMOL/L (ref 3.4–5.3)
SODIUM SERPL-SCNC: 138 MMOL/L (ref 136–145)

## 2022-11-23 NOTE — RESULT ENCOUNTER NOTE
Dear Mable,     Here are your recent basic metabolic panel results which are normal aside from a very slightly low calcium which I think you can safely recheck at your next office visit.    Please let us know if you have any questions or concerns.    Regards,  Sandra Mayen PA-C

## 2022-11-23 NOTE — RESULT ENCOUNTER NOTE
Dear Mable,     It was great to meet you this afternoon.    Here are your recent complete blood count results which are within the expected range.  I will let you know when I see the rest of your labs.  Please let us know if you have any questions or concerns.    Thanks,  Sandra Mayen PA-C

## 2023-02-19 ENCOUNTER — E-VISIT (OUTPATIENT)
Dept: FAMILY MEDICINE | Facility: CLINIC | Age: 48
End: 2023-02-19
Payer: COMMERCIAL

## 2023-02-19 DIAGNOSIS — R30.0 DYSURIA: Primary | ICD-10-CM

## 2023-02-19 PROCEDURE — 99421 OL DIG E/M SVC 5-10 MIN: CPT | Performed by: NURSE PRACTITIONER

## 2023-02-20 ENCOUNTER — OFFICE VISIT (OUTPATIENT)
Dept: URGENT CARE | Facility: URGENT CARE | Age: 48
End: 2023-02-20
Payer: COMMERCIAL

## 2023-02-20 VITALS
OXYGEN SATURATION: 98 % | HEART RATE: 70 BPM | SYSTOLIC BLOOD PRESSURE: 133 MMHG | TEMPERATURE: 97.4 F | DIASTOLIC BLOOD PRESSURE: 89 MMHG

## 2023-02-20 DIAGNOSIS — R30.0 DYSURIA: Primary | ICD-10-CM

## 2023-02-20 DIAGNOSIS — N30.00 ACUTE CYSTITIS WITHOUT HEMATURIA: ICD-10-CM

## 2023-02-20 LAB
ALBUMIN UR-MCNC: NEGATIVE MG/DL
APPEARANCE UR: CLEAR
BACTERIA #/AREA URNS HPF: ABNORMAL /HPF
BILIRUB UR QL STRIP: NEGATIVE
COLOR UR AUTO: YELLOW
GLUCOSE UR STRIP-MCNC: NEGATIVE MG/DL
HGB UR QL STRIP: ABNORMAL
KETONES UR STRIP-MCNC: NEGATIVE MG/DL
LEUKOCYTE ESTERASE UR QL STRIP: ABNORMAL
NITRATE UR QL: NEGATIVE
PH UR STRIP: 6.5 [PH] (ref 5–7)
RBC #/AREA URNS AUTO: ABNORMAL /HPF
SP GR UR STRIP: 1.01 (ref 1–1.03)
SQUAMOUS #/AREA URNS AUTO: ABNORMAL /LPF
UROBILINOGEN UR STRIP-ACNC: 0.2 E.U./DL
WBC #/AREA URNS AUTO: ABNORMAL /HPF

## 2023-02-20 PROCEDURE — 87186 SC STD MICRODIL/AGAR DIL: CPT

## 2023-02-20 PROCEDURE — 99213 OFFICE O/P EST LOW 20 MIN: CPT

## 2023-02-20 PROCEDURE — 81001 URINALYSIS AUTO W/SCOPE: CPT

## 2023-02-20 PROCEDURE — 87086 URINE CULTURE/COLONY COUNT: CPT

## 2023-02-20 RX ORDER — NITROFURANTOIN 25; 75 MG/1; MG/1
100 CAPSULE ORAL 2 TIMES DAILY
Qty: 20 CAPSULE | Refills: 0 | Status: SHIPPED | OUTPATIENT
Start: 2023-02-20 | End: 2023-03-02

## 2023-02-20 NOTE — PATIENT INSTRUCTIONS
Results for orders placed or performed in visit on 02/20/23   UA Macro with Reflex to Micro and Culture - lab collect     Status: Abnormal    Specimen: Urine, Midstream   Result Value Ref Range    Color Urine Yellow Colorless, Straw, Light Yellow, Yellow    Appearance Urine Clear Clear    Glucose Urine Negative Negative mg/dL    Bilirubin Urine Negative Negative    Ketones Urine Negative Negative mg/dL    Specific Gravity Urine 1.015 1.003 - 1.035    Blood Urine Moderate (A) Negative    pH Urine 6.5 5.0 - 7.0    Protein Albumin Urine Negative Negative mg/dL    Urobilinogen Urine 0.2 0.2, 1.0 E.U./dL    Nitrite Urine Negative Negative    Leukocyte Esterase Urine Small (A) Negative   Urine Microscopic     Status: Abnormal   Result Value Ref Range    Bacteria Urine Few (A) None Seen /HPF    RBC Urine 25-50 (A) 0-2 /HPF /HPF    WBC Urine 25-50 (A) 0-5 /HPF /HPF    Squamous Epithelials Urine Few (A) None Seen /LPF

## 2023-02-20 NOTE — PROGRESS NOTES
SUBJECTIVE: Everette Mayen is a 47 year old female who complains of urinary frequency, urgency and dysuria x 2 days, without flank pain, fever, chills, or abnormal vaginal discharge or bleeding. She had UTI 3 weeks and had 5 days of macrobid which worked well.  She thinks it has come back    OBJECTIVE:  /89 (BP Location: Left arm, Patient Position: Sitting, Cuff Size: Adult Large)   Pulse 70   Temp 97.4  F (36.3  C) (Tympanic)   LMP 12/20/2022 (Approximate)   SpO2 98%        Appears well, in no apparent distress.  Vital signs are normal. The abdomen is soft without tenderness, guarding, rebound  No CVA tenderness .     ASSESSMENT:    Diagnosis Comments   1. Dysuria  UA Macro with Reflex to Micro and Culture - lab collect, Urine Microscopic, Urine Culture       2. Acute cystitis without hematuria  nitroFURantoin macrocrystal-monohydrate (MACROBID) 100 MG capsule             PLAN: Treatment per orders - also push fluids, may use Pyridium OTC prn. Call or return to clinic prn if these symptoms worsen or fail to improve as anticipated.

## 2023-02-20 NOTE — PATIENT INSTRUCTIONS
Dear Everette Mayen,     After reviewing your responses, I would like you to come in for a urine test to make sure we treat you correctly. This urine test is to evaluate you for a possible urinary tract infection, and should be scheduled for today or tomorrow. Schedule a Lab Only appointment here.     Lab appointments are not available at most locations on the weekends. If no Lab Only appointment is available, you should be seen in any of our convenient Walk-in or Urgent Care Centers, which can be found on our website here.     You will receive instructions with your results in [employer] once they are available.     If your symptoms worsen, you develop pain in your back or stomach, develop fevers, or are not improving in 5 days, please contact your primary care provider for an appointment or visit a Walk-in or Urgent Care Center to be seen.     Thanks again for choosing us as your health care partner,     Bisi Licea NP

## 2023-02-22 LAB — BACTERIA UR CULT: ABNORMAL

## 2023-03-15 ENCOUNTER — OFFICE VISIT (OUTPATIENT)
Dept: MIDWIFE SERVICES | Facility: CLINIC | Age: 48
End: 2023-03-15
Payer: COMMERCIAL

## 2023-03-15 VITALS
WEIGHT: 143.6 LBS | SYSTOLIC BLOOD PRESSURE: 110 MMHG | DIASTOLIC BLOOD PRESSURE: 72 MMHG | HEIGHT: 62 IN | BODY MASS INDEX: 26.43 KG/M2

## 2023-03-15 DIAGNOSIS — N89.8 VAGINAL ODOR: ICD-10-CM

## 2023-03-15 DIAGNOSIS — N76.0 BACTERIAL VAGINOSIS: Primary | ICD-10-CM

## 2023-03-15 DIAGNOSIS — Z12.11 SCREEN FOR COLON CANCER: Primary | ICD-10-CM

## 2023-03-15 DIAGNOSIS — Z11.51 SCREENING FOR HPV (HUMAN PAPILLOMAVIRUS): ICD-10-CM

## 2023-03-15 DIAGNOSIS — Z12.4 SCREENING FOR CERVICAL CANCER: ICD-10-CM

## 2023-03-15 DIAGNOSIS — Z11.8 SCREENING FOR CHLAMYDIAL DISEASE: ICD-10-CM

## 2023-03-15 DIAGNOSIS — Z01.419 ENCOUNTER FOR GYNECOLOGICAL EXAMINATION WITHOUT ABNORMAL FINDING: ICD-10-CM

## 2023-03-15 DIAGNOSIS — B96.89 BACTERIAL VAGINOSIS: Primary | ICD-10-CM

## 2023-03-15 DIAGNOSIS — Z11.3 SCREEN FOR STD (SEXUALLY TRANSMITTED DISEASE): ICD-10-CM

## 2023-03-15 DIAGNOSIS — Z11.3 SCREENING FOR GONORRHEA: ICD-10-CM

## 2023-03-15 DIAGNOSIS — F99 MENTAL HEALTH DISORDER: ICD-10-CM

## 2023-03-15 LAB
CLUE CELLS: PRESENT
TRICHOMONAS, WET PREP: ABNORMAL
WBC'S/HIGH POWER FIELD, WET PREP: ABNORMAL
YEAST, WET PREP: ABNORMAL

## 2023-03-15 PROCEDURE — 86803 HEPATITIS C AB TEST: CPT | Performed by: ADVANCED PRACTICE MIDWIFE

## 2023-03-15 PROCEDURE — 36415 COLL VENOUS BLD VENIPUNCTURE: CPT | Performed by: ADVANCED PRACTICE MIDWIFE

## 2023-03-15 PROCEDURE — 99213 OFFICE O/P EST LOW 20 MIN: CPT | Mod: 25 | Performed by: ADVANCED PRACTICE MIDWIFE

## 2023-03-15 PROCEDURE — 86780 TREPONEMA PALLIDUM: CPT | Performed by: ADVANCED PRACTICE MIDWIFE

## 2023-03-15 PROCEDURE — 87491 CHLMYD TRACH DNA AMP PROBE: CPT | Performed by: ADVANCED PRACTICE MIDWIFE

## 2023-03-15 PROCEDURE — 87210 SMEAR WET MOUNT SALINE/INK: CPT | Performed by: ADVANCED PRACTICE MIDWIFE

## 2023-03-15 PROCEDURE — 99386 PREV VISIT NEW AGE 40-64: CPT | Performed by: ADVANCED PRACTICE MIDWIFE

## 2023-03-15 PROCEDURE — 87591 N.GONORRHOEAE DNA AMP PROB: CPT | Performed by: ADVANCED PRACTICE MIDWIFE

## 2023-03-15 PROCEDURE — 87624 HPV HI-RISK TYP POOLED RSLT: CPT | Performed by: ADVANCED PRACTICE MIDWIFE

## 2023-03-15 PROCEDURE — G0145 SCR C/V CYTO,THINLAYER,RESCR: HCPCS | Performed by: ADVANCED PRACTICE MIDWIFE

## 2023-03-15 RX ORDER — METRONIDAZOLE 500 MG/1
500 TABLET ORAL 2 TIMES DAILY
Qty: 14 TABLET | Refills: 0 | Status: SHIPPED | OUTPATIENT
Start: 2023-03-15 | End: 2023-03-22

## 2023-03-15 ASSESSMENT — ANXIETY QUESTIONNAIRES
2. NOT BEING ABLE TO STOP OR CONTROL WORRYING: MORE THAN HALF THE DAYS
GAD7 TOTAL SCORE: 16
GAD7 TOTAL SCORE: 16
1. FEELING NERVOUS, ANXIOUS, OR ON EDGE: NEARLY EVERY DAY
7. FEELING AFRAID AS IF SOMETHING AWFUL MIGHT HAPPEN: MORE THAN HALF THE DAYS
5. BEING SO RESTLESS THAT IT IS HARD TO SIT STILL: NEARLY EVERY DAY
IF YOU CHECKED OFF ANY PROBLEMS ON THIS QUESTIONNAIRE, HOW DIFFICULT HAVE THESE PROBLEMS MADE IT FOR YOU TO DO YOUR WORK, TAKE CARE OF THINGS AT HOME, OR GET ALONG WITH OTHER PEOPLE: VERY DIFFICULT
3. WORRYING TOO MUCH ABOUT DIFFERENT THINGS: MORE THAN HALF THE DAYS
6. BECOMING EASILY ANNOYED OR IRRITABLE: MORE THAN HALF THE DAYS

## 2023-03-15 ASSESSMENT — PATIENT HEALTH QUESTIONNAIRE - PHQ9
SUM OF ALL RESPONSES TO PHQ QUESTIONS 1-9: 17
5. POOR APPETITE OR OVEREATING: MORE THAN HALF THE DAYS

## 2023-03-15 NOTE — RESULT ENCOUNTER NOTE
Informed via mychart, wet prep positive for BV, recommend treatment with metronidazole, avoid etoh and intercourse for 7 days or use condoms. If patient desires alternative medication like metro gel she can reply to message.    FAYE Smith, CNM

## 2023-03-15 NOTE — PROGRESS NOTES
"Everette is a 47 year old  female who presents for annual exam.     Besides routine health maintenance, she brings concerns regarding her mood, vaginal odor and wanting STD screening   HPI:    Menses are irregular and heavy lasting 5 days.   Menses flow: heavy and with clots.  Patient's last menstrual period was 2022 (exact date). She had not had a period prior to December for a year and has not had a period in February or March of this year.   Using nothing for contraception.   She is not currently considering pregnancy.  She has had two sexual partners in the last 9 months. She is requesting STD screening today    REPRODUCTIVE/GYNECOLOGIC HISTORY:  Everette is sexually active with male partner(s) and is currently in monogamous relationship.   STI testing offered?  Accepted  History of abnormal Pap smear:  No   Reports slight vaginal odor noticed.   SOCIAL HISTORY  Abuse: does not report having previously been physical or sexually abused.    Do you feel safe in your environment? YES       She  reports that she has never smoked. She has never used smokeless tobacco.      Last PHQ-9 score on record =   PHQ-9 SCORE 3/15/2023   PHQ-9 Total Score -   PHQ-9 Total Score MyChart -   PHQ-9 Total Score 17     Last GAD7 score on record =   AVNI-7 SCORE 3/15/2023   Total Score -   Total Score 16     MOOD: States she has a hx of bipolar disorder and years ago was on Lithium. The provider that was managing her disorder and medication left her clinic and Mable's care was transferred to her PCP. Mable is unable to recall if she discontinued Lithium herself or if her PCP took her off of it.   She is interested in restarting medication. She feels she has recently experienced a  Hypomanic episode. \"I'm barely hanging on by a thread.\"  Teary eyed during today's visit.  Denies active or current thoughts of wanting to harm herself     HEALTH MAINTENANCE:  Cholesterol:   Recent Labs   Lab Test 21  1357 10/22/19  1334   CHOL 177 " 150   HDL 59 57   LDL 97 72   TRIG 106 107     Mammo:2022 . History of abnormal Mammo: No, Due this year.  Regular Self Breast Exams: No  TSH:   TSH   Date Value Ref Range Status   10/22/2019 1.34 0.40 - 4.00 mU/L Final      Pap;   Lab Results   Component Value Date    PAP NIL 10/27/2020    PAP NIL 2016    PAP NIL 2012      Immunizations up to date: yes  (Gardasil, Tdap, Flu)  Health maintenance updated:  yes    HEALTHY HABITS  Eating habits: eats regular meals and follows a balanced nutrition diet. Sometimes takes supplements  Multi-vitamin  Biotin   Vitamin  Calcium/Vitamin D intake: source: Does not think intake is adequate   Exercise: How often do you exercise? Tennis, swimming and dance Daily;  Have you had an eye exam in the last two years? YES     Do you routinely see the dentist once or twice yearly? No      HISTORY:  OB History    Para Term  AB Living   1 0 0 0 0 0   SAB IAB Ectopic Multiple Live Births   0 0 0 0 0      # Outcome Date GA Lbr Souleymane/2nd Weight Sex Delivery Anes PTL Lv   1                Birth Comments: System Generated. Please review and update pregnancy details.     Past Medical History:   Diagnosis Date     Allergic rhinitis, cause unspecified      Depressive disorder, not elsewhere classified      Hypertension      Other psoriasis      Unspecified complication of pregnancy, unspecified as to episode of care 3/09    Willis's/2nd trimester termination     No past surgical history on file.  Family History   Problem Relation Age of Onset     Hypertension Mother      Allergies Mother      Arthritis Mother         psoriasis     Depression Mother      Genitourinary Problems Mother         urinary incontinence     Gynecology Mother         hyst age 48     Alcohol/Drug Father      Depression Father      Diabetes Maternal Grandmother      Eye Disorder Maternal Grandmother      Cerebrovascular Disease Maternal Grandfather      Cardiovascular Paternal  Grandfather      Other Cancer Other      Genetic Disorder Son         No further genetic testing planned, microcephaly     Seizure Disorder Son          age 11     Melanoma No family hx of      Skin Cancer No family hx of      Coronary Artery Disease No family hx of      Hyperlipidemia No family hx of      Breast Cancer No family hx of      Colon Cancer No family hx of      Prostate Cancer No family hx of      Anxiety Disorder No family hx of      Substance Abuse No family hx of      Anesthesia Reaction No family hx of      Asthma No family hx of      Osteoporosis No family hx of      Thyroid Disease No family hx of      Social History     Socioeconomic History     Marital status:    Tobacco Use     Smoking status: Never     Smokeless tobacco: Never   Vaping Use     Vaping Use: Never used   Substance and Sexual Activity     Alcohol use: Yes     Alcohol/week: 0.0 standard drinks     Comment: < 1 drink / week     Drug use: No     Sexual activity: Not Currently     Partners: Male     Birth control/protection: None   Other Topics Concern     Parent/sibling w/ CABG, MI or angioplasty before 65F 55M? No   Social History Narrative    Cancer Warning Signs:  Patient states she has experienced none    Balanced Diet - No    Osteoporosis Prevention Measures - Dairy servings per day: 1    Regular Exercise -  Yes Describe biking    Dental Exam - YES - Date:     Eye Exam - YES - Date:     Self Breast Exam - No    Abuse: Current or Past (Physical, Sexual or Emotional)- No    Do you feel safe in your environment - Yes    Guns stored in the home - No    Sunscreen used - No    Seatbelts used - Yes    Lipids -  Has been a long time    Glucose -  Has been a long time    Colon Cancer Screening - No    Hemoccults - NO    Pap Test -  YES - Date:     Do you have any concerns about STD's -  Yes    Mammography - NO    DEXA - NO    Immunizations reviewed and up to date - No    LOU Chavez CMA    10/30/07       Current  "Outpatient Medications:      adalimumab (HUMIRA) 40 MG/0.8ML prefilled syringe kit, Inject 0.8 mLs (40 mg) Subcutaneous every 14 days, Disp: 2 each, Rfl: 5     amLODIPine-benazepril (LOTREL) 5-10 MG capsule, Take 1 capsule by mouth daily, Disp: 90 capsule, Rfl: 3     clobetasol (TEMOVATE) 0.05 % external ointment, Apply topically 2 times daily as needed (Psoriasis), Disp: 60 g, Rfl: 11     Allergies   Allergen Reactions     Sulfa Drugs Hives     Lupus Erythematous     Sulfacetamide Hives and Itching     Lupus Erythematous     Lamotrigine Rash     Lamotrigine Rash       Past medical, surgical, social and family history were reviewed and updated in EPIC.    ROS:   12 point review of systems negative other than symptoms noted below or in the HPI.    PHYSICAL EXAM:  /72   Ht 1.575 m (5' 2\")   Wt 65.1 kg (143 lb 9.6 oz)   LMP 12/20/2022 (Exact Date)   Breastfeeding No   BMI 26.26 kg/m     BMI: Body mass index is 26.26 kg/m .  Constitutional: Healthy, alert and no distress  Neck: symmetrical, thyroid normal size, no masses present, no lymphadenopathy present.   Cardiovascular: RRR, no murmurs present  Respiratory: Breathing unlabored, lungs CTA bilaterally  Breast:normal without masses, tenderness or nipple discharge and no palpable axillary masses or adenopathy  Gastrointestinal: Abdomen soft, non-tender, bowel sounds present  Abdominoplasty scar well healed  PELVIC EXAM:  Vulva: No lesions, no adenopathy, BUS WNL  Vagina: Moist, pink, discharge suspicious for BV, well rugated, no lesions  Cervix:Smooth, pink, no visible lesions  Uterus: Normal size, non-tender, mobile  Ovaries: No masses palpated  Rectal exam: deferred    ASSESSMENT/PLAN:    ICD-10-CM    1. Screen for colon cancer  Z12.11 Colonoscopy Screening  Referral      2. Encounter for gynecological examination without abnormal finding  Z01.419       3. Screening for cervical cancer  Z12.4 Pap screen with HPV - recommended age 30 - 65 years    "   4. Screening for gonorrhea  Z11.3       5. Screening for chlamydial disease  Z11.8       6. Screen for STD (sexually transmitted disease)  Z11.3 Treponema Abs w Reflex to RPR and Titer     Wet prep - Clinic Collect     Hepatitis C antibody     NEISSERIA GONORRHOEA PCR     CHLAMYDIA TRACHOMATIS PCR     Treponema Abs w Reflex to RPR and Titer     Hepatitis C antibody      7. Mental health disorder  F99 E-Consult to Behavioral Health     Adult Mental Health  Referral     CANCELED: E-Consult to Behavioral Health      8. Vaginal odor  N89.8       9. Screening for HPV (human papillomavirus)  Z11.51         No results found for any visits on 03/15/23.      COUNSELING:   Reviewed preventive health counseling, as reflected in patient instructions       Routine dental care   Referrals placed for urgent mental health evaluation. Advised to call clinic back if she does not hear from a  within the next few days.    Full STD screening offered, declined full panel and only the ones ordered  Pap done today per pt request  Declined any additional preventative lab work today (diabetes, thyroid, cholesterol, etc)  Discussed adequate daily calcium intake and vitamin D recommendations  Wet prep to r/o vaginal infection  Discussed gerber-criss pausal s/sx  Order placed for colonoscopy  Reports she gets her Mammos done elsewhere and knows she is due for a yearly exam.     In addition to the preventive visit, 15 minutes of the appointment were spent evaluating and developing a treatment plan for her additional concern(s).      Fabby MCKINNEY CNM

## 2023-03-15 NOTE — NURSING NOTE
Depression Response    Patient completed the PHQ-9 assessment for depression and scored >9? Yes  Question 9 on the PHQ-9 was positive for suicidality? No  Does patient have current mental health provider? Yes    Is this a virtual visit? No    I personally notified the following: visit provider     Flor Marques CMA on 3/15/2023 at 2:11 PM

## 2023-03-16 LAB
C TRACH DNA SPEC QL NAA+PROBE: NEGATIVE
HCV AB SERPL QL IA: NONREACTIVE
N GONORRHOEA DNA SPEC QL NAA+PROBE: NEGATIVE
T PALLIDUM AB SER QL: NONREACTIVE

## 2023-03-17 ENCOUNTER — E-CONSULT (OUTPATIENT)
Dept: PSYCHIATRY | Facility: CLINIC | Age: 48
End: 2023-03-17
Payer: COMMERCIAL

## 2023-03-17 NOTE — PROGRESS NOTES
3/17/2023     E-Consult has been denied due to: Doesn't meet criteria for E-Consult - Asked a logistical question (scheduling, referral request).    Interprofessional consultation requested by:  Fabby Levine APRN CNM      Clinical Question/Purpose: MY CLINICAL QUESTION IS: Patient has a hx of bipolar disorder and has been on Lithium in the past.     Patient assessment and information reviewed:     Recommendations: This looks like an ask for psychiatry evaluation and treatment referral.  Please place that referral.  We do not have the capability of getting an urgent referral, so it gives the option to do an e-consult.  E-consults are for providers who are going to prescribe and follow medications for mental health and they have questions on that.  If this is what was intended, please re send an e-consult.      The recommendations provided in this E-Consult are based on a review of clinical data pertinent to the clinical question presented, without a review of the patient's complete medical record or, the benefit of a comprehensive in-person or virtual patient evaluation. This consultation should not replace the clinical judgement and evaluation of the provider ordering this E-Consult. Any new clinical issues, or changes in patient status since the filing of this E-Consult will need to be taken into account when assessing these recommendations. Please contact me if you have further questions.    My total time spent reviewing clinical information and formulating assessment was 5 minutes.        Michel Randolph MD

## 2023-03-20 LAB
BKR LAB AP GYN ADEQUACY: NORMAL
BKR LAB AP GYN INTERPRETATION: NORMAL
BKR LAB AP HPV REFLEX: NORMAL
BKR LAB AP PREVIOUS ABNORMAL: NORMAL
PATH REPORT.COMMENTS IMP SPEC: NORMAL
PATH REPORT.COMMENTS IMP SPEC: NORMAL
PATH REPORT.RELEVANT HX SPEC: NORMAL

## 2023-03-22 LAB
HUMAN PAPILLOMA VIRUS 16 DNA: NEGATIVE
HUMAN PAPILLOMA VIRUS 18 DNA: NEGATIVE
HUMAN PAPILLOMA VIRUS FINAL DIAGNOSIS: ABNORMAL
HUMAN PAPILLOMA VIRUS OTHER HR: POSITIVE

## 2023-03-24 ENCOUNTER — PATIENT OUTREACH (OUTPATIENT)
Dept: OBGYN | Facility: CLINIC | Age: 48
End: 2023-03-24
Payer: COMMERCIAL

## 2023-03-26 ENCOUNTER — HEALTH MAINTENANCE LETTER (OUTPATIENT)
Age: 48
End: 2023-03-26

## 2023-03-27 ENCOUNTER — HOSPITAL ENCOUNTER (EMERGENCY)
Facility: CLINIC | Age: 48
Discharge: HOME | End: 2023-03-27
Attending: PSYCHIATRY & NEUROLOGY | Admitting: PSYCHIATRY & NEUROLOGY
Payer: COMMERCIAL

## 2023-03-27 VITALS
HEART RATE: 65 BPM | SYSTOLIC BLOOD PRESSURE: 120 MMHG | RESPIRATION RATE: 16 BRPM | DIASTOLIC BLOOD PRESSURE: 77 MMHG | OXYGEN SATURATION: 98 % | TEMPERATURE: 98 F

## 2023-03-27 DIAGNOSIS — F31.81 BIPOLAR 2 DISORDER (H): ICD-10-CM

## 2023-03-27 LAB — SARS-COV-2 RNA RESP QL NAA+PROBE: NEGATIVE

## 2023-03-27 PROCEDURE — 99285 EMERGENCY DEPT VISIT HI MDM: CPT | Mod: 25 | Performed by: PSYCHIATRY & NEUROLOGY

## 2023-03-27 PROCEDURE — C9803 HOPD COVID-19 SPEC COLLECT: HCPCS | Performed by: PSYCHIATRY & NEUROLOGY

## 2023-03-27 PROCEDURE — 99285 EMERGENCY DEPT VISIT HI MDM: CPT | Performed by: PSYCHIATRY & NEUROLOGY

## 2023-03-27 PROCEDURE — U0003 INFECTIOUS AGENT DETECTION BY NUCLEIC ACID (DNA OR RNA); SEVERE ACUTE RESPIRATORY SYNDROME CORONAVIRUS 2 (SARS-COV-2) (CORONAVIRUS DISEASE [COVID-19]), AMPLIFIED PROBE TECHNIQUE, MAKING USE OF HIGH THROUGHPUT TECHNOLOGIES AS DESCRIBED BY CMS-2020-01-R: HCPCS | Performed by: PSYCHIATRY & NEUROLOGY

## 2023-03-27 PROCEDURE — 90791 PSYCH DIAGNOSTIC EVALUATION: CPT

## 2023-03-27 RX ORDER — LURASIDONE HYDROCHLORIDE 20 MG/1
20 TABLET, FILM COATED ORAL
Qty: 30 TABLET | Refills: 0 | Status: SHIPPED | OUTPATIENT
Start: 2023-03-27 | End: 2023-04-21

## 2023-03-27 RX ORDER — TRAZODONE HYDROCHLORIDE 50 MG/1
50 TABLET, FILM COATED ORAL AT BEDTIME
COMMUNITY
End: 2023-03-27

## 2023-03-27 RX ORDER — LITHIUM CARBONATE 300 MG/1
300 TABLET, FILM COATED, EXTENDED RELEASE ORAL DAILY
COMMUNITY
Start: 2023-03-22 | End: 2023-04-21

## 2023-03-27 RX ORDER — TRAZODONE HYDROCHLORIDE 50 MG/1
50 TABLET, FILM COATED ORAL AT BEDTIME
Qty: 30 TABLET | Refills: 0 | Status: SHIPPED | OUTPATIENT
Start: 2023-03-27 | End: 2023-04-21

## 2023-03-27 ASSESSMENT — COLUMBIA-SUICIDE SEVERITY RATING SCALE - C-SSRS
TOTAL  NUMBER OF INTERRUPTED ATTEMPTS LIFETIME: NO
2. HAVE YOU ACTUALLY HAD ANY THOUGHTS OF KILLING YOURSELF?: NO
1. HAVE YOU WISHED YOU WERE DEAD OR WISHED YOU COULD GO TO SLEEP AND NOT WAKE UP?: NO
ATTEMPT LIFETIME: NO
6. HAVE YOU EVER DONE ANYTHING, STARTED TO DO ANYTHING, OR PREPARED TO DO ANYTHING TO END YOUR LIFE?: NO
TOTAL  NUMBER OF ABORTED OR SELF INTERRUPTED ATTEMPTS LIFETIME: NO

## 2023-03-27 ASSESSMENT — ACTIVITIES OF DAILY LIVING (ADL)
ADLS_ACUITY_SCORE: 35
ADLS_ACUITY_SCORE: 33

## 2023-03-27 NOTE — DISCHARGE SUMMARY
"Aftercare Plan  If I am feeling unsafe or I am in a crisis, I will:   Contact my established care providers   Call the National Suicide Prevention Lifeline: 988  Go to the nearest emergency room   Call 911     Warning signs that I or other people might notice when a crisis is developing for me: racing thoughts, agitation, rapid speech, \"lots of plans,\" hypersexual, and going on trips to \"escape.\"    Things I am able to do on my own to cope or help me feel better: exercise, homework, read, listen to music, etc.     Things that I am able to do with others to cope or help me better: spend time with friends, call therapist     Things I can use or do for distraction:   The box breathing method   1. Close your eyes. Breathe in through your nose while counting to four slowly. ...   2. Hold your breath inside while counting slowly to four. Try not to clamp your mouth or nose shut.   3. Begin to slowly exhale for 4 seconds.   4. Repeat steps 1 to 3 at least 10 times.     Use your five senses to help you move through distress.   1. Put your hands in cold water. ...   2.  or touch items near you. ...   3. Breathe deeply. ...   4. Savor a food or drink. ...   5. Take a short walk. ...   6. Hold a piece of ice. ...   7. Savor a scent. ...   8. Move your body.     5,4,3,2,1 Grounding Exercise   Grounding is a technique that helps us reorient to the here-and-now, to bring up into the present. They are a useful technique if you ever feel overwhelmed, intensely anxious, or dissociated from your environment. The  5,4,3,2,1  exercise is a common sensory awareness grounding exercise that many find helpful to relax or get through difficult moments.     1. Describe 5 things you can see in the room   2. Name 4 things you can feel ( my feet on the floor  or  the air in my nose )   3. Name 3 things you are hear right now ( traffic outside )   4. Name 2 things you can smell right now (or 2 smells that you like)   5. Name 1 thing you " like about yourself      5 4 3 2 1 CALM exercise to engage your senses:   5: Acknowledge FIVE things you see around you. Maybe it is a bird, maybe it is pencil, maybe it is a spot on the ceiling, however big or small, state 5 things you see.     4: Acknowledge FOUR things you can touch around you. Maybe this is your hair, hands, ground, grass, pillow, etc, whatever it may be, list out the 4 things you can feel.     3: Acknowledge THREE things you hear. This needs to be external, do not focus on your thoughts; maybe you can hear a clock, a car, a dog park. or maybe you hear your tummy rumbling, internal noises that make external sounds can count, what is audible in the moment is what you list.     2: Acknowledge TWO things you can smell: This one might be hard if you are not in a stimulating environment, if you cannot automatically sniff something out, walk nearby to find a scent. Maybe you walk to your bathroom to smell soap or outside to smell anything in nature, or even could be as simple as leaning over and smelling a pillow on the couch, or a pencil. Whatever it may be, take in the smells around you.     1. Acknowledge ONE thing you can taste. What does the inside of your mouth taste like, gum, coffee, or the sandwich from lunch? Focus on your mouth as the last step and take in what you can taste.       Changes I can make to support my mental health and wellness: take medications as prescribed, attend all appointments, create a calming space to relax in and reconnect to the present moment     People in my life that I can ask for help: friends, family, providers     Your [facility] has a mental health crisis team you can call 24/7: [facility] Mobile Crisis  [phone]     Other things that are important when I'm in crisis: being in a safe environment with people I trust     Additional resources and information:     Crisis Lines  Crisis Text Line  Text 669165  You will be connected with a trained live crisis  "counselor to provide support.    Por aniaanol, texto  CASANDRA a 820007 o texto a 442-AYUDAME en WhatsApp    The [employer] Project (LGBTQ Youth Crisis Line)  0.467.839.3371  text START to 294-828      Community Resources  Fast Tracker  Linking people to mental health and substance use disorder resources  [employer]     [place] Mental Health Warm Line  Peer to peer support  Monday thru Saturday, 12 pm to 10 pm  080.566.2532 or 1.799.035.2240  Text \"Support\" to 20274    National [place] on Mental Illness (PATY)  998.503.6569 or 1.888.PATY.HELPS      Mental Health Apps  My3  https://RewardSnap.org/    VirtualHopeBox  https://Sonicbids/apps/virtual-hope-box/      Additional Information  Today you were seen by a licensed mental health professional through Triage and Transition services, [facility] ([facility])  for a crisis assessment in the Emergency Department at [facility].  It is recommended that you follow up with your established providers (psychiatrist, mental health therapist, and/or primary care doctor - as relevant) as soon as possible. Coordinators from [facility] will be calling you in the next 24-48 hours to ensure that you have the resources you need.  You can also contact [facility] coordinators directly at [phone]. You may have been scheduled for or offered an appointment with a mental health provider. [facility] maintains an extensive network of licensed behavioral health providers to connect patients with the services they need.  We do not charge providers a fee to participate in our referral network.  We match patients with providers based on a patient's specific needs, insurance coverage, and location.  Our first effort will be to refer you to a provider within your care system, and will utilize providers outside your care system as needed.    "

## 2023-03-27 NOTE — CONSULTS
"Diagnostic Evaluation Consultation  Crisis Assessment    Patient was assessed: In Person  Patient location: [facility] EDH-D  Was a release of information signed: Yes. Providers included on the release: general AMANDA      Referral Data and Chief Complaint  Everette (\"Mable\") is a 47 year old, who uses she/her pronouns, and presents to the ED with family/friends. Patient is referred to the ED by family/friends. Patient is presenting to the ED for the following concerns: suicidal ideation.      Informed Consent and Assessment Methods     Patient is her own guardian. Writer met with patient and explained the crisis assessment process, including applicable information disclosures and limits to confidentiality, assessed understanding of the process, and obtained consent to proceed with the assessment. Patient was observed to be able to participate in the assessment as evidenced by her willingness to engage in conversation with [facility]. Assessment methods included conducting a formal interview with patient, review of medical records, collaboration with medical staff, and obtaining relevant collateral information from family and community providers when available..     Over the course of this crisis assessment provided reassurance, offered validation, engaged patient in problem solving and disposition planning and worked with patient on safety and aftercare planning. Patient's response to interventions was receptive.     Summary of Patient Situation  Pt came to the ED with her friends for evaluation of suicidal ideation. She states that she \"scared my friends\" by saying that she was \"fed up\" and \"wanted the pain to go away. Pt denied SI, HI, or NSSI. She is seeking connection to community mental health services, primarily psychiatry and programmatic care (IOP/day treatment).       Brief Psychosocial History  Pt lives alone and recently went through a difficult break-up. Her son passed away in 2021 at the age of 10 due to complications " associated with microcephaly and other special needs. Pt is a professor at the [employer].        Significant Clinical History  Pt is diagnosed with bipolar 2 disorder, depression, and anxiety. She currently sees a therapist and recently was connected to psychiatry through [facility]. The [facility] [place] prescribed pt lithium about a week ago but pt has not picked up thee medication, as she was not comfortable with the provider. No previous suicide attempts or psychiatric hospitalizations.        Collateral Information  Pt's friends were present during the assessment. They did not have additional information beyond what pt provided. However, they expressed concern that pt has not been able to connect with providers to manage her medications. Additionally, the friends feel that programmatic care, such as IOP, would be greatly beneficial for pt.       Risk Assessment  Sevier Suicide Severity Rating Scale Full Clinical Version: 03/27/2023  Suicidal Ideation  1. Wish to be Dead (Lifetime): (P) No  2. Non-Specific Active Suicidal Thoughts (Lifetime): (P) No     Suicidal Behavior  Actual Attempt (Lifetime): (P) No  Has subject engaged in non-suicidal self-injurious behavior? (Lifetime): (P) No  Interrupted Attempts (Lifetime): (P) No  Aborted or Self-Interrupted Attempt (Lifetime): (P) No  Preparatory Acts or Behavior (Lifetime): (P) No    C-SSRS Risk (Lifetime/Recent)  Calculated C-SSRS Risk Score (Lifetime/Recent): (P) No Risk Indicated    Validity of evaluation is not impacted by presenting factors during interview including frustration, emotional fatigue, and recent hypomanic episode.   Comments regarding subjective versus objective responses to Sevier tool: Pt was able to effectively answer questions regarding suicidality and self-harm.  Environmental or Psychosocial Events: loss of a loved one, loss of relationship due to divorce/separation and barriers to accessing healthcare  Chronic  "Risk Factors: serious, persistent mental illness   Warning Signs: talking or writing about death, dying, or suicide and acting reckless or engaging in risky activities  Protective Factors: lives in a responsibly safe and stable environment, good treatment engagement, help seeking, good problem-solving, coping, and conflict resolution skills and constructive use of leisure time, enjoyable activities, resilience  Interpretation of Risk Scoring, Risk Mitigation Interventions and Safety Plan:  Pt does not present as a risk of suicide or self-harm. Despite suicidality being the primary ED concern, pt denies suicidal ideation, stating that she only has been experiencing a desire to \"make the pain go away.\"     Does the patient have thoughts of harming others? No     Is the patient engaging in sexually inappropriate behavior?  no        Current Substance Abuse     Is there recent substance abuse? no     Was a urine drug screen or blood alcohol level obtained: No       Mental Status Exam     Affect: Flat   Appearance: Appropriate    Attention Span/Concentration: Attentive  Eye Contact: Variable   Fund of Knowledge: Appropriate    Language /Speech Content: Fluent   Language /Speech Volume: Normal    Language /Speech Rate/Productions: Normal    Recent Memory: Intact   Remote Memory: Intact   Mood: Sad    Orientation to Person: Yes    Orientation to Place: Yes   Orientation to Time of Day: Yes    Orientation to Date: Yes    Situation (Do they understand why they are here?): Yes    Psychomotor Behavior: Normal    Thought Content: Clear   Thought Form: Intact      History of commitment: No    Medication    Psychotropic medications: Yes. Pt is currently taking trazodone. Medication compliant: Yes. Recent medication changes: Yes pt was prescribed lithium but has not picked up the prescription yet.  Medication changes made in the last two weeks: Yes: lithium was prescribed about a week ago.       Current Care Team    Primary Care " "Provider: Yes. Location: [facility].   Psychiatrist: No. Pt recently saw a provider at [facility] but will not be following up with them.  Therapist: Yes. Name: Wade Savage PsyD, LMFT.   : No     CTSS or ARMHS: No  ACT Team: No  Other: No      Diagnosis    296.89 Bipolar II Disorder Hypomanic  296.30 (F33.9) Major Depressive Disorder, Recurrent Episode, Unspecified _  300.02 (F41.1) Generalized Anxiety Disorder          Clinical Summary and Substantiation of Recommendations    Pt present with flat affect and sad mood. She denies SI, HI, and NSSI but notes that she is \"fed up\" and \"wants the pain to go away.\" No drug or alcohol use. Pt expresses difficulties following a difficult break-up and a recent hypomanic episode. During her hypomanic episode, she experienced racing thoughts, rapid speech, creating \"lots of plans,\" and hypersexuality. Additionally, pt spontaneously took a flight to [place], which she describes as an \"escape.\" Pt indicates that she is mentally/emotionally tired and would like to get connected to outpatient psychiatry, as well as a day treatment/IOP program.   Pt does not present as an imminent risk of harm towards herself or others, nor does she meet inpatient criteria at this time. Recommendation is for pt to follow up with her therapist and get connected to additional outpatient services. She is [facility] for [facility] to 's IOP program. Pt will discharge home and Triage & Transition Services staff will contact pt tomorrow to set up a psychiatry appointment.    Disposition    Recommended disposition: Individual Therapy, Medication Management and Programmatic Care: IOP/day treatment       Reviewed case and recommendations with attending provider. Attending Name: Gama Cunningham MD       Attending concurs with disposition: Yes       Patient and/or validated legal guardian concurs with " "disposition: Yes       Final disposition: Individual therapy , Medication management and Programmatic care: [facility].     Outpatient Details (if applicable):   Aftercare plan and appointments placed in the AVS and provided to patient: Yes. Given to patient by ED staff    Was lethal means counseling provided as a part of aftercare planning? No;       Assessment Details    Patient interview started at: 15:11 and completed at: 15:32.     Total duration spent on the patient case in minutes: .75 hrs      CPT code(s) utilized: 11989 - Psychotherapy for Crisis - 60 (30-74*) min       Dana Grey, LICSW, MSW, LICSW, Psychotherapist  DEC - Triage & Transition Services  Callback: [phone]      Aftercare Plan  If I am feeling unsafe or I am in a crisis, I will:   Contact my established care providers   Call the National Suicide Prevention Lifeline: 988  Go to the nearest emergency room   Call 911     Warning signs that I or other people might notice when a crisis is developing for me: racing thoughts, agitation, rapid speech, \"lots of plans,\" hypersexual, and going on trips to \"escape.\"    Things I am able to do on my own to cope or help me feel better: exercise, homework, read, listen to music, etc.     Things that I am able to do with others to cope or help me better: spend time with friends, call therapist     Things I can use or do for distraction:   The box breathing method   1. Close your eyes. Breathe in through your nose while counting to four slowly. ...   2. Hold your breath inside while counting slowly to four. Try not to clamp your mouth or nose shut.   3. Begin to slowly exhale for 4 seconds.   4. Repeat steps 1 to 3 at least 10 times.     Use your five senses to help you move through distress.   1. Put your hands in cold water. ...   2.  or touch items near you. ...   3. Breathe deeply. ...   4. Savor a food or drink. ...   5. Take a short walk. ...   6. Hold a piece of ice. ...   7. " Savor a scent. ...   8. Move your body.     5,4,3,2,1 Grounding Exercise   Grounding is a technique that helps us reorient to the here-and-now, to bring up into the present. They are a useful technique if you ever feel overwhelmed, intensely anxious, or dissociated from your environment. The  5,4,3,2,1  exercise is a common sensory awareness grounding exercise that many find helpful to relax or get through difficult moments.     1. Describe 5 things you can see in the room   2. Name 4 things you can feel ( my feet on the floor  or  the air in my nose )   3. Name 3 things you are hear right now ( traffic outside )   4. Name 2 things you can smell right now (or 2 smells that you like)   5. Name 1 thing you like about yourself      5 4 3 2 1 CALM exercise to engage your senses:   5: Acknowledge FIVE things you see around you. Maybe it is a bird, maybe it is pencil, maybe it is a spot on the ceiling, however big or small, state 5 things you see.     4: Acknowledge FOUR things you can touch around you. Maybe this is your hair, hands, ground, grass, pillow, etc, whatever it may be, list out the 4 things you can feel.     3: Acknowledge THREE things you hear. This needs to be external, do not focus on your thoughts; maybe you can hear a clock, a car, a dog park. or maybe you hear your tummy rumbling, internal noises that make external sounds can count, what is audible in the moment is what you list.     2: Acknowledge TWO things you can smell: This one might be hard if you are not in a stimulating environment, if you cannot automatically sniff something out, walk nearby to find a scent. Maybe you walk to your bathroom to smell soap or outside to smell anything in nature, or even could be as simple as leaning over and smelling a pillow on the couch, or a pencil. Whatever it may be, take in the smells around you.     1. Acknowledge ONE thing you can taste. What does the inside of your mouth taste like, gum, coffee, or the  "sandwich from lunch? Focus on your mouth as the last step and take in what you can taste.       Changes I can make to support my mental health and wellness: take medications as prescribed, attend all appointments, create a calming space to relax in and reconnect to the present moment     People in my life that I can ask for help: friends, family, providers     Your [facility] has a mental health crisis team you can call 24/7: [facility] Mobile Crisis  [phone]     Other things that are important when I'm in crisis: being in a safe environment with people I trust     Additional resources and information:     Crisis Lines  Crisis Text Line  Text 721025  You will be connected with a trained live crisis counselor to provide support.    Por agata, texto  CASANDRA a 604295 o texto a 442-AYUDAME en WhatsApp    The [employer] Project (LGBTQ Youth Crisis Line)  7.443.248.9588  text START to 543-301      Community Resources  Fast Tracker  Linking people to mental health and substance use disorder resources  [facility].[employer]     [place] Mental Health Warm Line  Peer to peer support  Monday thru Saturday, 12 pm to 10 pm  845.872.7606 or 5.774.073.0246  Text \"Support\" to 55595    National [place] on Mental Illness (PATY)  585.301.6045 or 1.888.PATY.HELPS      Mental Health Apps  My3  https://myJacent Technologiespp.org/    VirtualHopeBox  https://CareWire.org/apps/virtual-hope-box/      Additional Information  Today you were seen by a licensed mental health professional through Triage and Transition services, [facility] ([facility])  for a crisis assessment in the Emergency Department at [facility].  It is recommended that you follow up with your established providers (psychiatrist, mental health therapist, and/or primary care doctor - as relevant) as soon as possible. Coordinators from [facility] will be calling you in the next 24-48 hours to ensure that you have the resources you need.  You can also contact " [facility] coordinators directly at [phone]. You may have been scheduled for or offered an appointment with a mental health provider. [facility] maintains an extensive network of licensed behavioral health providers to connect patients with the services they need.  We do not charge providers a fee to participate in our referral network.  We match patients with providers based on a patient's specific needs, insurance coverage, and location.  Our first effort will be to refer you to a provider within your care system, and will utilize providers outside your care system as needed.

## 2023-03-27 NOTE — ED PROVIDER NOTES
"ED Provider Note  [facility]      History     Chief Complaint   Patient presents with     Suicidal     SI with no plan after recent \"bad breakup\". Pt says she has \"chronic SI but this is an acute episode.\" Hx bipolar, not on any meds.     The history is provided by the patient and medical records.     Everette Mayen is a 47 year old female with history of bipolar II disorder presenting to the ED for suicidal ideation.  Patient reports that she scared her friends today because she told them she was \"fed up\".  Here she denies any suicidal ideation, homicidal ideation, or self-injurious behavior.  She has no history of prior attempts.  She states that she is emotionally tired and wants the pain to go away, but has no plan or intent of suicide.  She states that she has been experiencing hypomania for the last month with racing thoughts, speech, plans, and hypersexuality.  She went to  [place] a few weeks ago spontaneously to \"escape\".  She denies drug or alcohol use.  She does have a therapist.  She is currently on trazodone and was prescribed lithium 1 week ago, but has not picked this up because she does not trust the provider. She felt the provider was pushing pills and did not get a comprehensive evaluation  She is looking for a new psychiatrist.  She is open to trying day treatment.    Patient has no history of being on antipsychotics. She tried Lamictal previously and developed a rash. Patient denies drug use. She has no acute medical concerns.    Please see DEC crisis assessment on 3/27/2023 in epic for further details.    Past Medical History  Past Medical History:   Diagnosis Date     Allergic rhinitis, cause unspecified      Depressive disorder, not elsewhere classified      Hypertension 2008     Other psoriasis      Unspecified complication of pregnancy, unspecified as to episode of care 3/09    Willis's/2nd trimester termination     History reviewed. No pertinent surgical " history.  adalimumab (HUMIRA) 40 MG/0.8ML prefilled syringe kit  amLODIPine-benazepril (LOTREL) 5-10 MG capsule  lurasidone (LATUDA) 20 MG TABS tablet  traZODone (DESYREL) 50 MG tablet  clobetasol (TEMOVATE) 0.05 % external ointment  lithium ER (LITHOBID) 300 MG CR tablet      Allergies   Allergen Reactions     Sulfa Drugs Hives     Lupus Erythematous     Sulfacetamide Hives and Itching     Lupus Erythematous     Lamotrigine Rash     Lamotrigine Rash     Family History  Family History   Problem Relation Age of Onset     Hypertension Mother      Allergies Mother      Arthritis Mother         psoriasis     Depression Mother      Genitourinary Problems Mother         urinary incontinence     Gynecology Mother         hyst age 48     Alcohol/Drug Father      Depression Father      Diabetes Maternal Grandmother      Eye Disorder Maternal Grandmother      Cerebrovascular Disease Maternal Grandfather      Cardiovascular Paternal Grandfather      Other Cancer Other      Genetic Disorder Son         No further genetic testing planned, microcephaly     Seizure Disorder Son          age 11     Melanoma No family hx of      Skin Cancer No family hx of      Coronary Artery Disease No family hx of      Hyperlipidemia No family hx of      Breast Cancer No family hx of      Colon Cancer No family hx of      Prostate Cancer No family hx of      Anxiety Disorder No family hx of      Substance Abuse No family hx of      Anesthesia Reaction No family hx of      Asthma No family hx of      Osteoporosis No family hx of      Thyroid Disease No family hx of      Social History   Social History     Tobacco Use     Smoking status: Never     Smokeless tobacco: Never   Vaping Use     Vaping Use: Never used   Substance Use Topics     Alcohol use: Yes     Alcohol/week: 0.0 standard drinks     Comment: < 1 drink / week     Drug use: No         A medically appropriate review of systems was performed with pertinent positives and negatives noted  in the HPI, and all other systems negative.    Physical Exam   BP: 120/77  Pulse: 65  Temp: 98  F (36.7  C)  Resp: 16  SpO2: 98 %  Physical Exam  Vitals reviewed.   Constitutional:       Appearance: Normal appearance.   HENT:      Head: Normocephalic.   Eyes:      Pupils: Pupils are equal, round, and reactive to light.   Pulmonary:      Effort: Pulmonary effort is normal.   Musculoskeletal:         General: Normal range of motion.      Cervical back: Normal range of motion.   Neurological:      General: No focal deficit present.      Mental Status: She is alert.   Psychiatric:         Attention and Perception: Attention and perception normal. She does not perceive auditory or visual hallucinations.         Mood and Affect: Mood normal.         Speech: Speech normal.         Behavior: Behavior normal. Behavior is not agitated, aggressive, hyperactive or combative. Behavior is cooperative.         Thought Content: Thought content normal. Thought content is not paranoid or delusional. Thought content does not include homicidal or suicidal ideation.         Cognition and Memory: Cognition normal.         Judgment: Judgment normal.           ED Course, Procedures, & Data      Procedures       ED Course Selections: A consult was attained from the  DEC service. The case was discussed with the  from that service. The consulting service's recommendations were provided at 3:45 PM. 10 minutes spent discussing case, care and disposition.    10 minutes spent reviewing prior records, including care from Dr Eli Kaur and [place].     Mental Health Risk Assessment      PSS-3    Date and Time Over the past 2 weeks have you felt down, depressed, or hopeless? Over the past 2 weeks have you had thoughts of killing yourself? Have you ever attempted to kill yourself? When did this last happen? User   03/27/23 1247 yes yes no -- MEK      C-SSRS ([place])    Date and Time Q1 Wished to be Dead (Past Month) Q2 Suicidal Thoughts  (Past Month) Q3 Suicidal Thought Method Q4 Suicidal Intent without Specific Plan Q5 Suicide Intent with Specific Plan Q6 Suicide Behavior (Lifetime) Within the Past 3 Months? RETIRED: Level of Risk per Screen Screening Not Complete User   03/27/23 3647 yes yes no no no no -- -- -- MEK              Suicide assessment completed by mental health (D.E.C., LCSW, etc.)       Results for orders placed or performed during the hospital encounter of 03/27/23   Asymptomatic COVID-19 Virus (Coronavirus) by PCR Nasopharyngeal     Status: Normal    Specimen: Nasopharyngeal; Swab   Result Value Ref Range    SARS CoV2 PCR Negative Negative    Narrative    Testing was performed using the Xpert Xpress SARS-CoV-2 Assay on the Cepheid Gene-Xpert Instrument Systems. Additional information about this Emergency Use Authorization (EUA) assay can be found via the Lab Guide. This test should be ordered for the detection of SARS-CoV-2 in individuals who meet SARS-CoV-2 clinical and/or epidemiological criteria as well as from individuals without symptoms or other reasons to suspect COVID-19. Test performance for asymptomatic patients has only been established in anterior nasal swab specimens. This test is for in vitro diagnostic use under the FDA EUA for laboratories certified under CLIA to perform high complexity testing. This test has not been FDA cleared or approved. A negative result does not rule out the presence of PCR inhibitors in the specimen or target RNA concentration below the limit of detection for the assay. The possibility of a false negative should be considered if the patient's recent exposure or clinical presentation suggests COVID-19. This test was validated by the [facility]. This laboratory is certified under the Clinical Laboratory Improvement Amendments (CLIA) as qualified to perform high complexity laboratory testing.       Medications - No data to display  Labs Ordered and Resulted  from Time of ED Arrival to Time of ED Departure   COVID-19 VIRUS (CORONAVIRUS) BY PCR - Normal       Result Value    SARS CoV2 PCR Negative       No orders to display          Critical care was not performed.     Medical Decision Making  The patient's presentation was of high complexity (a chronic illness severe exacerbation, progression, or side effect of treatment).    The patient's evaluation involved:  an assessment requiring an independent historian (see separate area of note for details)  review of external note(s) from 3+ sources (see separate area of note for details)    The patient's management necessitated high risk (a decision regarding hospitalization).      Assessment & Plan    Patient is here for a mental health assessment. She has 2 friends accompanying her. She has history of bipolar 2 disorder and previously was seen by Dr Eli Kaur who prescribed Lithium and trazodone. Patient no longer sees Dr Kaur and been seeing her primary care provider. She only is taking trazodone to help manage sleep. She has been stressed recently, admits to poor sleep and making impulsive decisions. She saw a psychiatric provider a week ago who recommended she go back on Lithium but has since refused as she felt it was pushed on her without an adequate assessment. Meanwhile, she now feels depressed and passively suicidal. As she has no connected psychiatric services, she came here.     Patient denies acute SI nor feeling unsafe and needing admission. She would prefer discharge and following up with programmatic care and psychiatric provider.    I discussed at length about a need for mood stabilizer and since she is having sleep problems, then to consider an antipsychotic. She agrees to try Latuda. She was given a prescription for 20 mg daily. [facility] made referrals for programmatic care and med provider. She is encouraged to bridge her care with her primary care provider until she gets these services established. Patient can be  discharged. She is not at acute safety risk requiring a hold.    I have reviewed the nursing notes. I have reviewed the findings, diagnosis, plan and need for follow up with the patient.    Discharge Medication List as of 3/27/2023  4:47 PM      START taking these medications    Details   lurasidone (LATUDA) 20 MG TABS tablet Take 1 tablet (20 mg) by mouth daily with food, Disp-30 tablet, R-0, Local Print             Final diagnoses:   Bipolar 2 disorder (H)   I, Halle Lowery, am serving as a trained medical scribe to document services personally performed by Gama Cunningham MD, based on the provider's statements to me.     I, Gama Cunningham MD, was physically present and have reviewed and verified the accuracy of this note documented by Halle Lowery.    [facility] EMERGENCY DEPARTMENT  3/27/2023     Gama Cunningham MD  03/27/23 1809

## 2023-03-27 NOTE — DISCHARGE INSTRUCTIONS
Start trial of Latuda to address your mood concerns. Take trazodone as needed to manage sleep concerns  Follow-up with [facility]-referred programmatic care for intensive support. You were referred for a diagnostic assessment to determine the type of program.  Please follow-up with your established care provider for bridging care until you can see a psychiatric specialist.   Consider connecting to the Department of Psychiatry [phone]. They have residents who would enjoy working with you. The department also has clinics that specialize in Bipolar Diagnoses and management.

## 2023-03-29 ENCOUNTER — HOSPITAL ENCOUNTER (OUTPATIENT)
Dept: BEHAVIORAL HEALTH | Facility: CLINIC | Age: 48
Discharge: HOME | End: 2023-03-29
Attending: FAMILY MEDICINE | Admitting: FAMILY MEDICINE
Payer: COMMERCIAL

## 2023-03-29 ENCOUNTER — BEH TREATMENT PLAN (OUTPATIENT)
Dept: BEHAVIORAL HEALTH | Facility: CLINIC | Age: 48
End: 2023-03-29
Attending: PSYCHIATRY & NEUROLOGY

## 2023-03-29 ENCOUNTER — TELEPHONE (OUTPATIENT)
Dept: BEHAVIORAL HEALTH | Facility: CLINIC | Age: 48
End: 2023-03-29
Payer: COMMERCIAL

## 2023-03-29 DIAGNOSIS — F99 MENTAL HEALTH DISORDER: ICD-10-CM

## 2023-03-29 DIAGNOSIS — F31.81 HYPOMANIC BIPOLAR II DISORDER (H): Primary | ICD-10-CM

## 2023-03-29 PROCEDURE — 90791 PSYCH DIAGNOSTIC EVALUATION: CPT | Mod: GT | Performed by: COUNSELOR

## 2023-03-29 ASSESSMENT — COLUMBIA-SUICIDE SEVERITY RATING SCALE - C-SSRS
4. HAVE YOU HAD THESE THOUGHTS AND HAD SOME INTENTION OF ACTING ON THEM?: NO
5. HAVE YOU STARTED TO WORK OUT OR WORKED OUT THE DETAILS OF HOW TO KILL YOURSELF? DO YOU INTEND TO CARRY OUT THIS PLAN?: NO
3. HAVE YOU BEEN THINKING ABOUT HOW YOU MIGHT KILL YOURSELF?: NO
1. IN THE PAST MONTH, HAVE YOU WISHED YOU WERE DEAD OR WISHED YOU COULD GO TO SLEEP AND NOT WAKE UP?: YES
6. HAVE YOU EVER DONE ANYTHING, STARTED TO DO ANYTHING, OR PREPARED TO DO ANYTHING TO END YOUR LIFE?: NO
2. HAVE YOU ACTUALLY HAD ANY THOUGHTS OF KILLING YOURSELF IN THE PAST MONTH?: YES

## 2023-03-29 ASSESSMENT — PATIENT HEALTH QUESTIONNAIRE - PHQ9
SUM OF ALL RESPONSES TO PHQ QUESTIONS 1-9: 17
SUM OF ALL RESPONSES TO PHQ QUESTIONS 1-9: 17
10. IF YOU CHECKED OFF ANY PROBLEMS, HOW DIFFICULT HAVE THESE PROBLEMS MADE IT FOR YOU TO DO YOUR WORK, TAKE CARE OF THINGS AT HOME, OR GET ALONG WITH OTHER PEOPLE: VERY DIFFICULT

## 2023-03-29 ASSESSMENT — ANXIETY QUESTIONNAIRES
8. IF YOU CHECKED OFF ANY PROBLEMS, HOW DIFFICULT HAVE THESE MADE IT FOR YOU TO DO YOUR WORK, TAKE CARE OF THINGS AT HOME, OR GET ALONG WITH OTHER PEOPLE?: VERY DIFFICULT
7. FEELING AFRAID AS IF SOMETHING AWFUL MIGHT HAPPEN: SEVERAL DAYS
4. TROUBLE RELAXING: MORE THAN HALF THE DAYS
GAD7 TOTAL SCORE: 16
GAD7 TOTAL SCORE: 16
5. BEING SO RESTLESS THAT IT IS HARD TO SIT STILL: MORE THAN HALF THE DAYS
6. BECOMING EASILY ANNOYED OR IRRITABLE: MORE THAN HALF THE DAYS
1. FEELING NERVOUS, ANXIOUS, OR ON EDGE: NEARLY EVERY DAY
GAD7 TOTAL SCORE: 16
7. FEELING AFRAID AS IF SOMETHING AWFUL MIGHT HAPPEN: SEVERAL DAYS
2. NOT BEING ABLE TO STOP OR CONTROL WORRYING: NEARLY EVERY DAY
IF YOU CHECKED OFF ANY PROBLEMS ON THIS QUESTIONNAIRE, HOW DIFFICULT HAVE THESE PROBLEMS MADE IT FOR YOU TO DO YOUR WORK, TAKE CARE OF THINGS AT HOME, OR GET ALONG WITH OTHER PEOPLE: VERY DIFFICULT
3. WORRYING TOO MUCH ABOUT DIFFERENT THINGS: NEARLY EVERY DAY

## 2023-03-29 ASSESSMENT — PAIN SCALES - GENERAL: PAINLEVEL: NO PAIN (0)

## 2023-03-29 NOTE — PROGRESS NOTES
"Safety Plan:  Adult Long Safety Plan:      [facility]                                        Everette Mayen                SAFETY PLAN:  Step 1: Warning signs / cues (Thoughts, images, mood, situation, behavior) that a crisis may be developing:  ? Thoughts: I never have a positive relationship  ? Images: obsessive thoughts of death or dying: talking or writing about death, dying, or suicide and acting reckless or engaging in risky activities  ? Thinking Processes: ruminations (can't stop thinking about my problems), racing thoughts and highly critical and negative thoughts  ? Mood: worsening depression, hopelessness, helplessness, intense worry, disinhibited (not caring about things or consequences) and mood swings  ? Behaviors: can't stop crying, impulsive, reckless behaviors (acting without thinking): racing thoughts, agitation, rapid speech, \"lots of plans,\" hypersexual, and going on trips to \"escape.\" and not sleeping enough  ? Situations: changes in symptoms: hypomanic, serious, persistent mental illness   Step 2: Coping strategies - Things I can do to take my mind off of my problems without contacting another person (relaxation technique, physical activity):  ? Distress Tolerance Strategies:  intense exercise, exercise, homework, read, listen to music, etc.  and calling friends   ? Physical Activities: go for a walk, spending with friends and exercise.  ? Focus on helpful thoughts:  \"I will get through this\", \"It always passes\" and \"Ride the wave\"  Step 3: People and social settings that provide distraction:                 Name: A lot of friends, family, providers                Phone: NA  ? coffee shop, park, library, gym  and work         Step 4: Remind myself of people and things that are important to me and worth living for:  [place] an purpose in life.        Step 5: When I am in crisis, I can ask these people to help me use my safety plan:        "          Name: A lot foe friends, family, providers             Phone: KIANA  Step 6: Making the environment safe:   ? be around others  Step 7: Professionals or agencies I can contact during a crisis:  ? Suicide Prevention Lifeline: Call or Text 124   Local Crisis Services: [facility]  [phone]      Call 911 or go to my nearest emergency department.       I helped develop this safety plan and agree to use it when needed.  I have been given a copy of this plan.       Client signature _________________________________________________________________  Today s date:  3/29/2023  Completed by Provider Name/ Credentials:  Marty Blankenship, [facility]                    March 29, 2023  Adapted from Safety Plan Template 2008 Marisa Jones and Adrien Celestin is reprinted with the express permission of the authors.  No portion of the Safety Plan Template may be reproduced without the express, written permission.  You can contact the authors at [email or web address] or [email or web address].

## 2023-03-29 NOTE — PROGRESS NOTES
"RN Review of Medical History / Physical Health Screen  Outpatient Mental Health Programs - [facility]+ [facility]    PATIENT'S NAME: Everette Mayen  Preferred name: Mable   She/Her/Hers/Herself 47 year old  MRN:   [id number]  :   1975  ACCT. NUMBER: [id number]  CURRENT AGE:  47 year old    DATE OF DIAGNOSTIC ASSESSMENT: 3/29/2023    DATE OF ADMISSION: 3/30/23     Please see Diagnostic Assessment for additional Medical History.     General Health:   Have you had any exposure to any communicable disease in the past 2-3 weeks? no     Are you aware of safe sex practices? yes   Do you have a history of seizures?     If so, do you have a seizure plan? Known triggers?     Notify patient that we will call 911 (if virtual) or a code (if in-person), if we were to witness seizure during group. no    no  no    yes     Nutrition:    Are you on a special diet? If yes, please explain:  no   Do you have any concerns regarding your nutritional status? If yes, please explain:  no   Have you had any appetite changes in the last 3 months?  Yes, decreased, working on losing some weight, intentional     Have you had any weight loss or weight gain in the last 3 months?  Yes, how much? Lost about 10 pounds; had a tummy tuck in Dec 2022     Do you have a history of an eating disorder? no   Do you have a history of being in an eating disorder program? no         Height/Weight Review:  Patient reported height:  5'2\"      Patient reports weight:  Date last checked:  139 pounds   3/29/2023         Patient height and weight recorded by RN in epic flowsheet: No; Unable to measure  Programmatic Care currently provided via telehealth. All pt weights and heights will be collected through patient self-report and recorded in physical health screening progress note upon admission to the program.      BMI Review:  Was the patient informed of BMI? no      Findings See above         Fall Risk:   Have you had any falls in the " past 3 months? no     Do you currently use any assistive devices for mobility?   no      Does the patient have medication concerns? no     Was an MTM referral placed? no      Does the patient have any acute or chronic pain concerns that might impact participation in the program? no       Additional Comments/Assessment: Pt denies seizures (current/historical), dizziness, mobility concerns. No fall risk assessed; No safety concerns r/t falls.      Per completion of the Medical History / Physical Health Screen, is there a recommendation to see / follow up with a primary care physician/clinic or dentist?    No.          Fabby Gordon RN  3/29/2023

## 2023-03-29 NOTE — PROGRESS NOTES
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 calling friends     Physical Activities: go for a walk, spending with friends and exercise.    Focus on helpful thoughts:  \"I will get through this\", \"It always passes\" and \"Ride the wave\"  Step 3: People and social settings that provide distraction:   Name: A lot of friends, family, providers  Phone: NA    coffee shop, park, library, gym  and work   Step 4: Remind myself of people and things that are important to me and worth living for:  [place] an purpose in life.      Step 5: When I am in crisis, I can ask these people to help me use my safety plan:   Name: A lot foe friends, family, providers  Phone: NA  Step 6: Making the environment safe:     be around others  Step 7: Professionals or agencies I can contact during a crisis:    Suicide Prevention Lifeline: Call or Text 645   Local Crisis Services: [facility] Mobile Crisis  [phone]     Call 911 or go to my nearest emergency department.   I helped develop this " safety plan and agree to use it when needed.  I have been given a copy of this plan.      Client signature _________________________________________________________________  Today s date:  3/29/2023  Completed by Provider Name/ Credentials:  Marty Blankenship, [facility], [facility]  March 29, 2023  Adapted from Safety Plan Template 2008 Marisa Jones and Adrien Celestin is reprinted with the express permission of the authors.  No portion of the Safety Plan Template may be reproduced without the express, written permission.  You can contact the authors at bhs@[email or web address] or kristal@mail.[facility].        .  Patient consented to co-developed safety plan.  Safety and risk management plan was completed.  Patient agreed to use safety plan should any safety concerns arise.  A copy was given to the patient..          Report to child / adult protection services was NA.     2. Patient's identified no connie / Muslim / spiritual influences relevant to programming at this time.     3. Initial Treatment will focus on:    Depressed Mood -    Anxiety -    Mood Instability -    Risk Management / Safety Concerns related to: Suicidal ideation.     4. Resources/Service Plan:    services are not indicated.   Modifications to assist communication are not indicated.   Additional disability accommodations are not indicated.      5. Collaboration:   Collaboration / coordination of treatment will be initiated with the following  support professionals: outpatient therapist and Targeted Case Management (TCM).      6.  Referrals:   The following referral(s) will be initiated: Day Treatment. Next Scheduled Appointment: 3/30/23.      A Release of Information has been obtained for the following: Targeted Case Management (TCM).     Emergency Contact AMANDA was not obtained.      Clinical Substantiation/medical necessity for the above recommendations:  Patient is a 47-year-old heterosexual  single female who presents with a history of  "Bipolar, anxiety and depression.  Patient is seeking services due to \"Symptoms of bipolar disease and emotion regulation\". Patient expresses difficulties following a difficult break-up last finding that his partner had another woman, feeling used and worthless, and a recent hypomanic episode about 6 weeks ago. During her hypomanic episode, she experienced racing thoughts, sleep deprive, sleeping and no fatigue, calling irregularly, rapid speech talking fats, lack of sleep and attention creating \"lots of plans,\" and hypersexuality. Additionally, patient spontaneously took a flight to [place], which she describes as an \"escape\", quitting her job, sell her house, get of her boyfriend and finding a new purpose in life. Pt indicates that she is mentally/emotionally tired. Patient reports a couple psychiatric hospitalizations at [place] in 1995 and 2015, denies any substance use treatments or group therapy. Patient denies a history of civil commitment. Patient is currently attending psychotherapy with Wade Savage PsyD, LMFT as needed and is taking psychotropic medications as prescribed.    Patient endorses with Change in sleep, Lack of interest, Excessive or inappropriate guilt, change in energy level, Difficulties concentrating, change in appetite, Feelings of hopelessness, Feelings of helplessness, Low self-worth, Ruminations, Irritability, Feeling sad, down, or depressed and Frequent crying. Pt denied SI, HI, or NSSI. Elevated mood, Irritability, Racing thoughts, Increased activity, decreased need for sleep, Pressured speech, Hypersexual, Restlessness, Distractibility, and Impulsiveness. Excessive worry, Nervousness, Physical complaints, such as headaches, stomachaches, muscle tension, Sleep disturbance, Psychomotor agitation, Ruminations, Poor concentration and Irritability.     Patient is employed full-time and living alone. Patient agrees with referral to ADT at [facility] and is scheduled to begin " programming on 3/30/23. Patient's acute suicide risk was determined to be low due to the following factors: Admission of suicidal ideation/thoughts with no method/plan, or no history of suicide attempt. Patient is not currently under the influence of alcohol or illicit substances, denies experiencing command hallucinations, and has no direct access to firearms. Patient's acute risk could be higher if noncompliant with treatment plan, medications, follow-up appointments or using illicit substances or alcohol. Protective factors include dedication to family or friends; regular sleep; effectively controls impulses; regular physical activity; purpose; secure attachment; help seeking behaviors when distressed; adherence with prescribed medication; strong sense of self-worth or esteem. Patient endorses with suicidal ideation/thoughts with no method or plan, no past or recent suicide attempt, is not currently using illicit substances, however, a safety plan was developed. Patient instructed to present to her nearest emergency room if symptoms get exacerbate.    7. RACHEL:    RACHEL:  Discussed the general effects of drugs and alcohol on health and well-being. Provider gave patient printed information about the  effects of chemical use on her health and well being. Recommendations: none.     8. Records:   These were reviewed at time of assessment.   Information in this assessment was obtained from the medical record and  provided by patient who is a good historian. Patient will have open access to her mental health medical record.    9.   Interactive Complexity: No      Provider Name/ Credentials:  [facility]  Dual   Phone: [phone]  Fax: [phone]    March 29, 2023

## 2023-03-29 NOTE — PROGRESS NOTES
"    Admission SBAR NOTE  Adult  Outpatient Programs          SITUATION:     Admission Date: 3/30/2023    Provider verified identity through the following two step process.  Patient provided: verbal spelling of full first and last name and Patient     Patient name:  Everette Mayen  Preferred name: Mable She/Her/Hers/Herself 47 year old  Diagnosis/-es (copy from [employer], including ICD-10):    296.89 Bipolar II Disorder Hypomanic  296.32 (F33.1) Major Depressive Disorder, Recurrent Episode, Moderate _ and With melancholic features.  4. Other Diagnoses that is relevant to services:   300.02 (F41.1) Generalized Anxiety Disorder.      Assigned Program/Track: 3A    Reviewed patient's schedule and informed them of any variation due to holidays. yes    Does the patient have any planned absences and/or barriers to admission/treatment? yes Week of  - meetings;  - out of town  NOTE: impact of transportation, technology, childcare, work, or housing concerns.    Insurance: Payor: MEDICA / Plan: MEDICA ESSENTIAL / Product Type: Indemnity /  Changes/Concerns: no    Does patient need an appointment with the program provider? no  NOTE: If yes, please confirm/schedule provider visit.      BACKGROUND:     Patient's stated goal/reason for treatment (copy from [employer]; confirm with patient): \"\"Symptoms of bipolar disease and emotion regulation\". Patient expresses difficulties following a difficult break-up last finding that his partner had another woman, feeling used and worthless, and a recent hypomanic episode about 6 weeks ago. During her hypomanic episode, she experienced racing thoughts, sleep deprive, sleeping and no fatigue, calling irregularly, rapid speech talking fats, lack of sleep and attention creating \"lots of plans,\" and hypersexuality. Additionally, pt spontaneously took a flight to [place], which she describes as an \"escape\", quitting her job, sell her house, get of her boyfriend and finding a new " "purpose in life. Pt indicates that she is mentally/emotionally tired.  The problem(s) began 02/12/ \"      ASSESSMENT:     Please consult  if any of the following concerns may impact admission/participation in program:     PHQ, AVNI and PROMIS done within 7 days OR send upon admission if over 7 days.      [place] Suicide Severity Rating (select Lifetime/Recent):   [place] Suicide Severity Rating Scale (Lifetime/Recent)  [place] Suicide Severity Rating (Lifetime/Recent) 8/31/2015 3/27/2023 3/29/2023   Q1 Wished to be Dead (Past Month) - yes yes   Q2 Suicidal Thoughts (Past Month) - yes yes   Q3 Suicidal Thought Method - no no   Q4 Suicidal Intent without Specific Plan - no no   Q5 Suicide Intent with Specific Plan - no no   Q6 Suicide Behavior (Lifetime) - no no   Level of Risk per Screen - low risk low risk   Do you have guns available to you? No - -   1. Wish to be Dead (Lifetime) - 0 -   2. Non-Specific Active Suicidal Thoughts (Lifetime) - 0 -   Actual Attempt (Lifetime) - 0 -   Has subject engaged in non-suicidal self-injurious behavior? (Lifetime) - 0 -   Interrupted Attempts (Lifetime) - 0 -   Aborted or Self-Interrupted Attempt (Lifetime) - 0 -   Preparatory Acts or Behavior (Lifetime) - 0 -   Calculated C-SSRS Risk Score (Lifetime/Recent) - No Risk Indicated -       LOCUS completed for recommended level of care? yes       17  Composite Score    IOP: 17-19; PHP: 20-22     Copy/Paste current Safety Plan to the BEH TX PLAN ENCOUNTER. yes    Safety status/concerns: no     Substance use concerns: no     Pertinent Medical/Nutritional concerns: no    Review Tele-Health Requirements (including secure environment, confidentiality, in-state status, equipment needs and process - encourage MyChart): yes    Confirm Emergency Contact listed in the SnapShot/Demographics with patient and notify OBC if an update is required. yes Fanny Gardner - [phone]    Paper or Docusign requirements for ROIs, " e-AMANDA, emergency contact, etc have been completed? no Resent  If not, do upon admission.     Does patient have FMLA or Short-Term Disability requests/plans? no - directed to OP prov  NOTE: Whenever possible, FMLA or Short-Term Disability paperwork needs to be managed/completed by the patient's community provider.   Exceptions: Patient does not have a community provider AND request is specific to mental health and time off for the duration of the program participation.    Notify RN Triage as soon as possible.     Care Providers/Medication Management Needs:     Does patient have a current community or other [facility] provider prescribing medications for mental health? no  NOTE: Delete below if not applicable:         NOTE: Inform patients, program is temporary and we will not be transferring care. Patient's should continue to see their community provider.       Individual Therapist/Name:  Wade Savage PsyD, LMFT   Clinic name/location: Private  Last appointment:  3/28/23  Next appointment:  3/31/23     Patient will continue to see above provider while participating in program: yes        RECOMMENDATIONS:     Patient Admission Completed: yes    Care Team, referrals made/needed: yes  - Psychiatry; pt will also establish care with PCP  PCP: aKtelynn Schwartz  NOTE: Notify RN, as needed, to make internal referrals.                                                             Completed by: Fabby Gordon RN

## 2023-03-29 NOTE — PROGRESS NOTES
LOCUS Worksheet     Name: Everette Mayen MRN: [id number]    : 1975      Gender:  female    PMI:  NA   Provider Name: [facility]   Provider NPI:  [id number]    Actual level of Care Provided:  Therapy    Service(s) receiving or referred to:  ADT    Reason for Variance: Due to patient mental health symptoms worsening and suicidal ideation      Rating completed by: Marty Blankenship, ALEXC, LADC      I. Risk of Harm:   2      Low Risk of Harm    II. Functional Status:   2      Mild Impairment    III. Co-Morbidity:   1      No Co-Morbidity    IV - A. Recovery Environment - Level of Stress:   3      Moderately Stress Environment    IV - B. Recovery Environment - Level of Support:   3      Limited Support in Environment    V. Treatment and Recovery History:   3      Moderate to Equivocal Response to Treatment and Recovery Management    VI. Engagement and Recovery Project:   3      Limited Engagement and Recovery       17 Composite Score    Level of Care Recommendation:   17 to 19       High Intensity Community Based Services

## 2023-03-30 ENCOUNTER — TELEPHONE (OUTPATIENT)
Dept: BEHAVIORAL HEALTH | Facility: CLINIC | Age: 48
End: 2023-03-30

## 2023-03-30 ENCOUNTER — HOSPITAL ENCOUNTER (OUTPATIENT)
Dept: BEHAVIORAL HEALTH | Facility: CLINIC | Age: 48
Discharge: HOME | End: 2023-03-30
Attending: PSYCHIATRY & NEUROLOGY
Payer: COMMERCIAL

## 2023-03-30 PROCEDURE — 90853 GROUP PSYCHOTHERAPY: CPT | Mod: GT,95

## 2023-03-30 PROCEDURE — G0177 OPPS/PHP; TRAIN & EDUC SERV: HCPCS | Mod: GT,95

## 2023-03-30 NOTE — GROUP NOTE
"Process Group Note    PATIENT'S NAME: Everette Mayen  MRN:   [id number]  :   1975  ACCT. NUMBER: [id number]  DATE OF SERVICE: 3/30/23  START TIME:  9:00 AM  END TIME:  9:50 AM  FACILITATOR: Dorina Cruz LGSW3  TOPIC:  Process Group    Diagnoses:  296.89 Bipolar II Disorder Hypomanic  296.32 (F33.1) Major Depressive Disorder, Recurrent Episode, Moderate _ and With melancholic features.  300.02 (F41.1) Generalized Anxiety Disorder.      [facility] Mental Health Day Treatment  TRACK: 3A    NUMBER OF PARTICIPANTS: 4    Service Modality:  Video Visit     Telemedicine Visit: The patient's condition can be safely assessed and treated via synchronous audio and visual telemedicine encounter.      Reason for Telemedicine Visit: Services only offered telehealth    Originating Site (Patient Location): Patient's home    Distant Site (Provider Location): Provider Remote Setting- Home Office    Consent:  The patient/guardian has verbally consented to: the potential risks and benefits of telemedicine (video visit) versus in person care; bill my insurance or make self-payment for services provided; and responsibility for payment of non-covered services.     Patient would like the video invitation sent by:  My Chart    Mode of Communication:  Video Conference via Medical Zoom    As the provider I attest to compliance with applicable laws and regulations related to telemedicine.             Data:    Session content: At the start of this group, patients were invited to check in by identifying themselves, describing their current emotional status, and identifying issues to address in this group.   Area(s) of treatment focus addressed in this session included Symptom Management, Personal Safety and Community Resources/Discharge Planning.  Reported mood is \"well-rested.\"  Client denied safety concerns, including SI and SIB. Client denies any chemical use.  Client is taking medications as prescribed. Client " "talked about being in the ED recently and using new medication, Latuda for sleep. \"I am hopeful. I feel confident.\" Client's goal is catch up on work. Client will apply the following skills: mindfulness and patience. Client is grateful for the opportunity of group. Peers offered supportive feedback and validation.      Therapeutic Interventions/Treatment Strategies:  Psychotherapist offered support, feedback and validation. Treatment modalities used include Cognitive Behavioral Therapy. Interventions include Behavioral Activation: Reinforced benefits/challenges of change process through applying skills to replace unwanted behaviors and Mindfulness: Encouraged a plan to use mindfulness skills in daily life.    Assessment:    Patient response:   Patient responded to session by accepting feedback, giving feedback, listening and focusing on goals    Possible barriers to participation / learning include: and no barriers identified    Health Issues:   None reported       Substance Use Review:   Substance Use: No active concerns identified.    Mental Status/Behavioral Observations  Appearance:   Appropriate   Eye Contact:   Good   Psychomotor Behavior: Normal   Attitude:   Cooperative  Interested Friendly  Orientation:   All  Speech   Rate / Production: Normal    Volume:  Normal   Mood:    Anxious  Depressed   Affect:    Appropriate   Thought Content:   Clear and Safety denies any current safety concerns including suicidal ideation, self-harm, and homicidal ideation  Thought Form:  Coherent  Logical     Insight:    Good     Plan:     Safety Plan: No current safety concerns identified.  Recommended that patient call 911 or go to the local ED should there be a change in any of these risk factors.     Barriers to treatment: None identified    Patient Contracts (see media tab):  None    Substance Use: Not addressed in session     Continue or Discharge: Patient will continue in Adult Day Treatment (ADT)  as planned. Patient is " likely to benefit from learning and using skills as they work toward the goals identified in their treatment plan.      Dorina Cruz, [facility]  March 30, 2023

## 2023-03-30 NOTE — GROUP NOTE
Psychoeducation Group Note    PATIENT'S NAME: Everette Mayen  MRN:   [id number]  :   1975  ACCT. NUMBER: [id number]  DATE OF SERVICE: 3/30/23  START TIME: 11:00 AM  END TIME: 11:50 AM  FACILITATOR: Drew Del Rio OTR/L  TOPIC: MH Life Skills Group: Resiliency Development                                    Service Modality:  Video Visit     Telemedicine Visit: The patient's condition can be safely assessed and treated via synchronous audio and visual telemedicine encounter.      Reason for Telemedicine Visit: Patient has requested telehealth visit    Originating Site (Patient Location): Patient's home    Distant Site (Provider Location): Provider Remote Setting- Home Office    Consent:  The patient/guardian has verbally consented to: the potential risks and benefits of telemedicine (video visit) versus in person care; bill my insurance or make self-payment for services provided; and responsibility for payment of non-covered services.     Mode of Communication:  Video Conference via Medical Zoom    As the provider I attest to compliance with applicable laws and regulations related to telemedicine.      [facility] Mental Health Day Treatment  TRACK: 3A    NUMBER OF PARTICIPANTS: 4    Summary of Group / Topics Discussed:  Resiliency Development:  Values and Motivation: Patients were taught how to identify stressors, signs of stress, coping skills, and prevention strategies for overall stress management.  Patients were given the opportunity to identify both ongoing and acute mental health symptoms and how to effectively manage these symptoms by developing an effective aftercare plan.  Patients increased awareness of community based resources.    Patient Session Goals / Objectives:    Identified how using coping skills can be used for symptom and stress management       Improved awareness of individualed symptoms and stressors and how to effectively cope     Established a relapse prevention  plan to practice these skills in their own environments    Practiced and reflected on how to generalize taught skills to their everyday life        Patient Participation / Response:  Fully participated with the group by sharing personal reflections / insights and openly received / provided feedback with other participants.    Demonstrated understanding of content through handout, video education and group discussion , Verbalized understanding of content and Patient would benefit from additional opportunities to practice the content to be able to generalize it to their everyday life with increased intentionality, consistency, and efficacy in support of their psychiatric recovery    Treatment Plan:  Patient has a current master individualized treatment plan.  See Epic treatment plan for more information.    Drew Del Rio, OTR/L

## 2023-03-30 NOTE — TELEPHONE ENCOUNTER
----- Message from Marty Blankenship, [facility], LADC sent at 3/29/2023 11:59 AM CDT -----  Regarding: Referral to ADT  Adult Mental Health Programmatic Care Schedule Request    Patient Name: Everette Mayen  Location of programming: [facility]  Start Date: 3/30/23    Group:   Adult Program Group: Day Treatment 3A Mood / 45+ age group Track [[id number]]  Schedule: M, T, TH 9am-12noon  9 hours per week for 12 weeks  Number of visits to be scheduled: 36 days  Attending Provider (MD):  Dr Jt Winston.  Visit Type:  Virtual     Accommodations Needed: NA  Red Flags Identified/Substantiation: NA  Consulted with Supervisor: KIANA    Send to:   [BEH Outpatient UR ([id number]]       14 OBC's (BEH BEHAVIORAL OUTPATIENT ASSESSMENTS [[id number]])   Aziza Peralta (Day Tx & 55+)  Nel Pope (PHP & DDP)  Fredi Martin / Julio C Bernstein

## 2023-03-30 NOTE — TELEPHONE ENCOUNTER
----- Message from Shiraz Plascencia sent at 3/30/2023  9:08 AM CDT -----  Regarding: FW: Referral to ADT  Hi UR team,    Please add to BRYAN DOW. Thank you    Shiraz [facility]     ----- Message -----  From: Marty Blankenship, [facility], LADC  Sent: 3/29/2023  12:00 PM CDT  To: Riya Bernstein, [facility], Aziza ePralta, [facility], #  Subject: Referral to ADT                                  Adult Mental Health Programmatic Care Schedule Request    Patient Name: Everette Mayen  Location of programming: [facility]  Start Date: 3/30/23    Group:   Adult Program Group: Day Treatment 3A Mood / 45+ age group Track [[id number]]  Schedule: M, T, TH 9am-12noon  9 hours per week for 12 weeks  Number of visits to be scheduled: 36 days  Attending Provider (MD):  Dr Jt Winston.  Visit Type:  Virtual     Accommodations Needed: KIANA  Red Flags Identified/Substantiation: KIANA  Consulted with Supervisor: KIANA    Send to:   [BEH Outpatient UR ([id number]]       14 OBC's (BEH BEHAVIORAL OUTPATIENT ASSESSMENTS [[id number]])   Aziza Peralta (Day Tx & 55+)  Nel Pope (PHP & DDP)  Fredi Martin / Julio C Bernstein
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
## 2023-03-30 NOTE — GROUP NOTE
Psychoeducation Group Note    PATIENT'S NAME: Everette Mayen  MRN:   [id number]  :   1975  ACCT. NUMBER: [id number]  DATE OF SERVICE: 3/30/23  START TIME: 10:00 AM  END TIME: 10:50 AM  FACILITATOR: Fabby Gordon RN  TOPIC:  Wellness Group: [facility]                                    Service Modality:  Video Visit     Telemedicine Visit: The patient's condition can be safely assessed and treated via synchronous audio and visual telemedicine encounter.      Reason for Telemedicine Visit:  virtual    Originating Site (Patient Location): Patient's home    Distant Site (Provider Location): Provider Remote Setting- Home Office    Consent:  The patient/guardian has verbally consented to: the potential risks and benefits of telemedicine (video visit) versus in person care; bill my insurance or make self-payment for services provided; and responsibility for payment of non-covered services.     Patient would like the video invitation sent by:  My Chart    Mode of Communication:  Video Conference via Medical Zoom    As the provider I attest to compliance with applicable laws and regulations related to telemedicine.         [facility] 55+ Program  TRACK: 3A    NUMBER OF PARTICIPANTS: 4    Summary of Group / Topics Discussed:  Foundations of Health: Sleep: Pathophysiology of sleep disorders: The anatomy of sleep was reviewed including structures, stages, mechanisms, and the purpose that sleep has on the brain. Sleep disorders were discussed within the group with a focus on gaining knowledge about the etiology and pathophysiology of insomnia, sleep apnea, restless leg syndrome, narcolepsy, medications that can cause risk for sleeping disorders, and other symptoms/factors that may interfere with sleep. Risk factors for developing sleep disorders were discussed and treatments/pharmacological options were explored.     Patient Session Goals / Objectives:  ? Described the effect and purpose of  sleep on the brain and identify the amount of sleep needed  ? Identified differences between sleep disorders and risks associated with sleep disorders  ? Described the connection between sleep disturbances and mental illness    Increased knowledge about treatments for sleep disorders      Patient Participation / Response:  Fully participated with the group by sharing personal reflections / insights and openly received / provided feedback with other participants.    Demonstrated understanding of topics discussed through group discussion and participation and Identified / Expressed personal readiness to practice skills    Treatment Plan:  Patient has an initial individualized treatment plan that was created as part of their diagnostic assessment / admission process.  A master individualized treatment plan is in the process of being developed with the patient and multi-disciplinary care team.    Fabby Gordon RN

## 2023-03-30 NOTE — DISCHARGE SUMMARY
Adult Mental Health Intensive Outpatient Discharge Summary/Instructions      Patient: Everette Mayen MRN: [id number]   : 1975 Age: 47     Admission Date: 3/30/23  Discharge Date: 2023  Diagnosis: 296.89 Bipolar II Disorder Hypomanic  296.32 (F33.1) Major Depressive Disorder, Recurrent Episode, Moderate _ and With melancholic features.  300.02 (F41.1) Generalized Anxiety Disorder.    Focus of Treatment / Progress    Personal Safety: Mable denies safety concerns at time of check in.     * Follow your safety plan     * Call crisis lines as needed:    [facility] [phone] [facility] [phone]  [facility] [phone] Crisis Connection [phone]  [facility] [phone] [facility] COPE [phone]  [facility] [phone] National Suicide Prevention [phone]  [facility] [phone] Suicide Prevention [phone]  [facility] [phone]    Managing symptoms of:  Depression, Hypomania, Anxiety, Grief, Trauma    Community support/health:  Supportive friends, place of employment    PEER SUPPORT PROGRAMS:       [place] Mental Health Warmline  https://mentalhealthmn.org/support/[place]-warmline/   The [facility] is safe, anonymous, confidential, free, and here to help you when you need it.  Open Monday-Saturday, 12 PM to 10 PM   [phone]; Toll Free 855-WARMLINE or [phone] or text  Support  to 33122       The Peer Support Connection [facility]  The Peer Support Connection [facility] is a safe and free way for individuals to receive confidential and anonymous one on one peer support from trained Peers, Certified Peer Support Specialists, and Recovery Coaches.  https://[employer].org/william   Available from 5pm - 9am  (7 days a week/365 days a year)  [phone]     Mental Health Apps  My3  https://[employer].org/  VirtualHopeBox  https://[employer]/apps/virtual-hope-box/    Managing Symptoms and Preventing  "Relapse    * Go to all of your appointments    * Take all medications as directed.      * Carry a current list if medication with you    * Do not use illicit (street) drugs.  Avoid alcohol    * Report these symptoms to your care team. These are early signs of relapse:   Thoughts of suicide   Losing more sleep   Increased confusion   Mood getting worse   Other:  Increased impulsivity    *Use these skills daily:  Practice Mindfulness, Reality Checks, Opposite to Emotion, Identify thought distortions & feelings, distractions, express self, keep appointments, take medications daily, journal, use gratitude, self-compassion, Wise Mind, Use of the 5 Senses, grounding, Talk to someone you trust at least one time weekly, set boundaries and say \"no\", be assertive, act opposite of negative feelings, accept challenges with a positive attitude, exercise at least three times per week for 30 minutes,  get enough sleep, eat healthy foods, get into a good routine    Copy of summary sent to: In Epic My Chart    Follow up with psychiatrist / main caregiver: Psychiatry referral placed for [facility] psychiatry in April 2023; is also considering the [facility] resident clinic     Next visit: Mable will schedule  * She reports a discussion with Dr. Winston indicating she may not need psychiatry services at this time.    Follow up with your therapist: [facility]   Next visit: Early August  Recently established care with new individual therapist after previous one (Wade Savage PsyD, MIREYA) closed his private practice    Go to group therapy and / or support groups at: [facility] Connection ,DBSA (Depression Bipolar Support [place]) and VPSN  (Virtual Peer Support Network)    Consider [facility] support groups.   Listings for the free support groups are at https://[facility].org/support/[facility]-[place]-support-groups/ ([phone]) [phone]   free support groups for numerous group therapy topics & issues including: individual, family, couples, " LGBTQ, Young Adults, parenting, anxiety, grief and loss, depression, and  others    Virtual Peer Support Network by [facility]: Virtual Peer Support Network (VPSN) -- [facility]  Mental Health Advocacy (mnwitw.org) Each meeting (via Zoom) will provide tools and support to help you lead a healthier and happier life. We offer sessions that focus on specific topics such as recovery support, senior living, LGBTQ+ support and more. See our calendar here. Available from 10am - 4pm  (7 Days a Week)    To find more support groups: https://mentalhealthmn.org/what-we-do/peer-support/support-groups/    To find more community based mental health resources: https://mentalhealthmn.org/community-resources/     DBT: Dialectical Behavior Therapy (class that focuses on: Mindfulness, Emotion Regulation, Distress Tolerance, and Interpersonal Effectiveness) Dialectical Behavior Therapy Certified Providers https://[place].gov/.../dialectical-behavior-therapy/dbt-certified-providers    See your medical doctor about:  For an annual physical exam or any general wellness or illness as needed.    Other:  Your team appreciates the opportunity to work with you and wishes you the best of luck.  Please call [phone] with any further questions.    Client Signature:____Everette Mayen (signed copy will be uploaded to medical chart)  Date___7/6/2023    Staff Signature:  ____  JODY Linares, [facility] Clinical   Stacie Vidal D,  Licensed Clinical Psychologist  Date ___7/6/2023   time  ______1200 pm    Main office [facility], [facility], [facility]:  [phone]

## 2023-04-03 ENCOUNTER — HOSPITAL ENCOUNTER (OUTPATIENT)
Dept: BEHAVIORAL HEALTH | Facility: CLINIC | Age: 48
Discharge: HOME | End: 2023-04-03
Attending: PSYCHIATRY & NEUROLOGY
Payer: COMMERCIAL

## 2023-04-03 PROCEDURE — 90853 GROUP PSYCHOTHERAPY: CPT | Mod: GT | Performed by: PSYCHOLOGIST

## 2023-04-03 PROCEDURE — G0177 OPPS/PHP; TRAIN & EDUC SERV: HCPCS | Mod: GT | Performed by: OCCUPATIONAL THERAPIST

## 2023-04-03 PROCEDURE — G0177 OPPS/PHP; TRAIN & EDUC SERV: HCPCS | Mod: 95

## 2023-04-03 NOTE — GROUP NOTE
Psychoeducation Group Note    PATIENT'S NAME: Everette Mayen  MRN:   [id number]  :   1975  ACCT. NUMBER: [id number]  DATE OF SERVICE: 23  START TIME: 10:00 AM  END TIME: 10:50 AM  FACILITATOR: Drew Del Rio OTR/L  TOPIC:  Life Skills Group: Communication and Social Skills Development                                    Service Modality:  Video Visit     Telemedicine Visit: The patient's condition can be safely assessed and treated via synchronous audio and visual telemedicine encounter.      Reason for Telemedicine Visit: Patient has requested telehealth visit    Originating Site (Patient Location): Patient's home    Distant Site (Provider Location): Provider Remote Setting- Home Office    Consent:  The patient/guardian has verbally consented to: the potential risks and benefits of telemedicine (video visit) versus in person care; bill my insurance or make self-payment for services provided; and responsibility for payment of non-covered services.     Mode of Communication:  Video Conference via Medical Zoom    As the provider I attest to compliance with applicable laws and regulations related to telemedicine.        [facility] Adult Mental Health Day Treatment  TRACK: 3A    NUMBER OF PARTICIPANTS: 5 (3 Patients from Track 5A)    Summary of Group / Topics Discussed:  Communication and Social Skills Development: Communication Skills:Patients completed a self assessment of personal communication skills by identifying both strengths and opportunities for growth in areas of messages, emotions, assertiveness and listening skills.  Patients gained awareness of effective communication skills to improve overall communication and connection with other people.      Patient Session Goals / Objectives:    Identified strengths and opportunities for growth in communication skills and how these  impact their ability to communicate clearly with other people       Improved awareness of important aspects  of communication skills and how this relates to mental health recovery        Established a plan for practice of these skills in their own environments    Practiced and reflected on how to generalize taught skills to their everyday life      Patient Participation / Response:  Fully participated with the group by sharing personal reflections / insights and openly received / provided feedback with other participants.    Demonstrated understanding of content through handouts and group discussion , Verbalized understanding of content and Patient would benefit from additional opportunities to practice the content to be able to generalize it to their everyday life with increased intentionality, consistency, and efficacy in support of their psychiatric recovery    Treatment Plan:  Patient has a current master individualized treatment plan.  See Epic treatment plan for more information.    Drew Del Rio, OTR/L

## 2023-04-03 NOTE — GROUP NOTE
Psychoeducation Group Note    PATIENT'S NAME: Everette Mayen  MRN:   [id number]  :   1975  ACCT. NUMBER: [id number]  DATE OF SERVICE: 23  START TIME: 11:00 AM  END TIME: 11:50 AM  FACILITATOR: Jane Maxwell OTR/L  TOPIC: MH Life Skills Group: Sensory Approaches in Mental Health  [facility] Adult Mental Health Day Treatment  TRACK:  3A and 5A combined    NUMBER OF PARTICIPANTS: 6                                      Service Modality:  Video Visit     Telemedicine Visit: The patient's condition can be safely assessed and treated via synchronous audio and visual telemedicine encounter.      Reason for Telemedicine Visit: Patient has requested telehealth visit    Originating Site (Patient Location): Patient's home    Distant Site (Provider [facility]    Consent:  The patient/guardian has verbally consented to: the potential risks and benefits of telemedicine (video visit) versus in person care; bill my insurance or make self-payment for services provided; and responsibility for payment of non-covered services.     Patient would like the video invitation sent by:  My Chart    Mode of Communication:  Video Conference via Medical Zoom    As the provider I attest to compliance with applicable laws and regulations related to telemedicine.      Summary of Group / Topics Discussed:  Sensory Approaches in Mental Health:  Self Regulation Through Sensory Modulation:  Patients were provided with education on Autonomic Nervous System activation and taught skills that can be used immediately to help them calm down and regain self control.  Patients were taught how to recognize specific signs and symptoms of their individualized state of arousal and how to make changes when needed.  Patients explored body based skills (bottom up processing skills) and techniques to help manage symptoms and change level of arousal when needed to be in control and  comfortable so they are able to function in different environments.       Patient Session Goals / Objectives:    Identified specific and individualized neurosensory skills to help when distressed and for crisis management    Identified signs and symptoms of current level of arousal and ways to make changes in level of arousal when needed    Established a plan for practice of these skills in their own environments      Patient Participation / Response:  Fully participated with the group by sharing personal reflections / insights and openly received / provided feedback with other participants.    Patient presentation: congruent affect observed; active in group discussion sharing ideas, offering support to peers; and asking questions; demonstrated good insight into the topic, Verbalized understanding of content and Patient would benefit from additional opportunities to practice the content to be able to generalize it to their everyday life with increased intentionality, consistency, and efficacy in support of their psychiatric recovery    Treatment Plan:  Patient has an initial individualized treatment plan that was created as part of their diagnostic assessment / admission process.  A master individualized treatment plan is in the process of being developed with the patient and multi-disciplinary care team.    Jane Maxwell OTR/L

## 2023-04-03 NOTE — GROUP NOTE
Process Group Note                                    Service Modality:  Video Visit     Telemedicine Visit: The patient's condition can be safely assessed and treated via synchronous audio and visual telemedicine encounter.      Reason for Telemedicine Visit: Patient has requested telehealth visit, Patient unable to travel, Patient convenience (e.g. access to timely appointments / distance to available provider), Patient lives in a designated Health Professional Shortage Area (HPSA), and Services only offered telehealth    Originating Site (Patient Location): Patient's home    Distant Site (Provider Location): [facility]: [facility], [facility]    Consent:  The patient/guardian has verbally consented to: the potential risks and benefits of telemedicine (video visit) versus in person care; bill my insurance or make self-payment for services provided; and responsibility for payment of non-covered services.     Patient would like the video invitation sent by:  My Chart    Mode of Communication:  Video Conference via Medical Zoom    As the provider I attest to compliance with applicable laws and regulations related to telemedicine.        PATIENT'S NAME: Everette Mayen  MRN:   [id number]  :   1975  ACCT. NUMBER: [id number]  DATE OF SERVICE: 23  START TIME:  9:00 AM  END TIME:  9:50 AM  FACILITATOR: Azeb Emanuel PsyD  TOPIC:  Process Group    Diagnoses:  296.89 Bipolar II Disorder Hypomanic  296.32 (F33.1) Major Depressive Disorder, Recurrent Episode, Moderate _ and With melancholic features.  300.02 (F41.1) Generalized Anxiety Disorder.       [facility] Adult Mental Health Day Treatment  TRACK: 3A merged with 5A    NUMBER OF PARTICIPANTS: 2          Data:    Session content: At the start of this group, patients were invited to check in by identifying themselves, describing their current emotional status, and identifying issues to address in this group.   Area(s) of treatment  "focus addressed in this session included Symptom Management, Personal Safety and Community Resources/Discharge Planning.  Client reported being safe today.  Reported mood is \"good.\"   Goal for today is to attend group therapy online and talk with others.  The client talked to the group about how she has grief and loss issues, lost two children and  and this weekend is an anniversary of it. She reported suicidal ideation, and thought of using a knife to harm herself, but reported being safe today and put the knife away. She stated that she has a therapist but talked to the group about grief and loss and how she is thinking \"what if \" she had done this or that. The group encouraged her to get a therapist specifically for grief and loss. She reported taking medications and starting a new medication for Bipolar and feeling better. She reported talking to friends, paying bills, going to work, and playing tennis.       Therapeutic Interventions/Treatment Strategies:  Psychotherapist offered support, feedback and validation.    Assessment:    Patient response:   Patient responded to session by listening, focusing on goals and accepting support    Possible barriers to participation / learning include: severity of symptoms    Health Issues:   None reported       Substance Use Review:   Substance Use: No active concerns identified.    Mental Status/Behavioral Observations  Appearance:   Appropriate   Eye Contact:   Good   Psychomotor Behavior: Normal   Attitude:   Cooperative   Orientation:   All  Speech   Rate / Production: Normal    Volume:  Normal   Mood:    Anxious  Depressed  Sad   Affect:    Constricted   Thought Content:   Rumination  Thought Form:  Coherent  Logical     Insight:    Good  and Fair     Plan:     Safety Plan: Recommended that patient call 911 or go to the local ED should there be a change in any of these risk factors.     Barriers to treatment: None identified    Patient Contracts (see media " tab):  None    Substance Use: Provided encouragement towards sobriety     Continue or Discharge: Patient will continue in Adult Day Treatment (ADT)  as planned. Patient is likely to benefit from learning and using skills as they work toward the goals identified in their treatment plan.      Azeb Emanuel PsyD  April 3, 2023  Jesica Vidal., IMTIAZ,  Licensed Clinical Psychologist

## 2023-04-04 ENCOUNTER — HOSPITAL ENCOUNTER (OUTPATIENT)
Dept: BEHAVIORAL HEALTH | Facility: CLINIC | Age: 48
Discharge: HOME | End: 2023-04-04
Attending: PSYCHIATRY & NEUROLOGY
Payer: COMMERCIAL

## 2023-04-04 ENCOUNTER — TRANSCRIBE ORDERS (OUTPATIENT)
Dept: OTHER | Age: 48
End: 2023-04-04

## 2023-04-04 DIAGNOSIS — B97.7 HPV IN FEMALE: Primary | ICD-10-CM

## 2023-04-04 PROCEDURE — 90853 GROUP PSYCHOTHERAPY: CPT | Mod: 95

## 2023-04-04 PROCEDURE — G0177 OPPS/PHP; TRAIN & EDUC SERV: HCPCS | Mod: GT

## 2023-04-04 NOTE — GROUP NOTE
"Process Group Note    PATIENT'S NAME: Everette Mayen  MRN:   [id number]  :   1975  ACCT. NUMBER: [id number]  DATE OF SERVICE: 23  START TIME:  9:00 AM  END TIME:  9:50 AM  FACILITATOR: Celsa Arguello LGSW  TOPIC:  Process Group    Diagnoses:  296.89 Bipolar II Disorder Hypomanic  296.32 (F33.1) Major Depressive Disorder, Recurrent Episode, Moderate _ and With melancholic features.  300.02 (F41.1) Generalized Anxiety Disorder.      [facility] Mental Health Day Treatment  TRACK: 3A    NUMBER OF PARTICIPANTS: 3                                      Service Modality:  Video Visit     Telemedicine Visit: The patient's condition can be safely assessed and treated via synchronous audio and visual telemedicine encounter.      Reason for Telemedicine Visit: Services only offered telehealth    Originating Site (Patient Location): Patient's home    Distant Site (Provider Location): Provider Remote Setting- Home Office    Consent:  The patient/guardian has verbally consented to: the potential risks and benefits of telemedicine (video visit) versus in person care; bill my insurance or make self-payment for services provided; and responsibility for payment of non-covered services.     Patient would like the video invitation sent by:  My Chart    Mode of Communication:  Video Conference via Medical Zoom    As the provider I attest to compliance with applicable laws and regulations related to telemedicine.         Data:    Session content: At the start of this group, patients were invited to check in by identifying themselves, describing their current emotional status, and identifying issues to address in this group.   Area(s) of treatment focus addressed in this session included Symptom Management, Personal Safety and Community Resources/Discharge Planning.  Reported mood is euthymic.  Her goal is to do a \"deep dive\" into why she is feeling good today.   She shared that this past Saturday she was " "depressed and 8 days ago she went to the ED for SI. She would like to understand more about her mood lability and \"biochemistry\". Barriers to this goal includes her \"inner voice\". Writer suggested mood tracking to which client reports already doing through her employer's wellness program.  Writer guided client through a behavior chain to explore vulnerabilities, action urges and other variables for mood shifts.  Client denied safety concerns, including SI and SIB. Client reports having one mixed drink this weekend and is exploring the possibility of going \"dry\". She reports being on Latuda and Trazadone for 8 days. She is hopeful that her latuda is \"kicking in\". Client is taking medications as prescribed. Client reported using the following coping skills since last session:  Client talked about looking forward to seeing her family this weekend.  She is grateful for being surrounded by her friends.  Peers offered supportive feedback and validation.    Therapeutic Interventions/Treatment Strategies:  Psychotherapist offered support, feedback and validation and reinforced use of skills. Treatment modalities used include Cognitive Behavioral Therapy and Dialectical Behavioral Therapy. Interventions include Cognitive Restructuring:  Explored impact of ineffective thoughts / distortions on mood and activity and Assisted patient in formulating new neutral/positive alternatives to challenge less helpful / ineffective thoughts and Emotions Management:  Reinforced the purpose and biological basis of emotions, Increased awareness of daily mood patterns/changes and Conducted a behavior chain analysis.    Assessment:    Patient response:   Patient responded to session by accepting feedback, giving feedback, listening, focusing on goals, being attentive and accepting support    Possible barriers to participation / learning include: and no barriers identified    Health Issues:   None reported       Substance Use " Review:   Substance Use: No active concerns identified.    Mental Status/Behavioral Observations  Appearance:   Appropriate   Eye Contact:   Good   Psychomotor Behavior: Normal   Attitude:   Cooperative  Interested Friendly Pleasant  Orientation:   All  Speech   Rate / Production: Normal    Volume:  Normal   Mood:    Euthymic  Affect:    Appropriate   Thought Content:   Clear and Safety denies any current safety concerns including suicidal ideation, self-harm, and homicidal ideation  Thought Form:  Coherent  Logical     Insight:    Good     Plan:     Safety Plan: No current safety concerns identified.  Recommended that patient call 911 or go to the local ED should there be a change in any of these risk factors.     Barriers to treatment: None identified    Patient Contracts (see media tab):  None    Substance Use: Not addressed in session     Continue or Discharge: Patient will continue in Adult Day Treatment (ADT)  as planned. Patient is likely to benefit from learning and using skills as they work toward the goals identified in their treatment plan.      NICOLE Linares  April 4, 2023

## 2023-04-04 NOTE — GROUP NOTE
Psychoeducation Group Note    PATIENT'S NAME: Everette Mayen  MRN:   [id number]  :   1975  ACCT. NUMBER: [id number]  DATE OF SERVICE: 23  START TIME: 11:00 AM  END TIME: 11:50 AM  FACILITATOR: Drew Del Rio OTR/L  TOPIC: MH Life Skills Group: Resiliency Development                                    Service Modality:  Video Visit     Telemedicine Visit: The patient's condition can be safely assessed and treated via synchronous audio and visual telemedicine encounter.      Reason for Telemedicine Visit: Patient has requested telehealth visit    Originating Site (Patient Location): Patient's home    Distant Site (Provider Location): Provider Remote Setting- Home Office    Consent:  The patient/guardian has verbally consented to: the potential risks and benefits of telemedicine (video visit) versus in person care; bill my insurance or make self-payment for services provided; and responsibility for payment of non-covered services.     Mode of Communication:  Video Conference via Medical Zoom    As the provider I attest to compliance with applicable laws and regulations related to telemedicine.      [facility] Mental Health Day Treatment  TRACK: 3A    NUMBER OF PARTICIPANTS: 3    Summary of Group / Topics Discussed:  Resiliency Development:  Coping Skills- Personal Risk Taking: Patients were taught how to identify stressors, signs of stress, coping skills, and prevention strategies for overall stress management.  Patients were given the opportunity to identify both ongoing and acute mental health symptoms and how to effectively manage these symptoms by developing an effective aftercare plan.  Patients increased awareness of community based resources.    Patient Session Goals / Objectives:    Identified how using coping skills can be used for symptom and stress management       Improved awareness of individualed symptoms and stressors and how to effectively cope     Established a  relapse prevention plan to practice these skills in their own environments    Practiced and reflected on how to generalize taught skills to their everyday life        Patient Participation / Response:  Fully participated with the group by sharing personal reflections / insights and openly received / provided feedback with other participants.    Demonstrated understanding of content through handouts and group discussion , Verbalized understanding of content and Patient would benefit from additional opportunities to practice the content to be able to generalize it to their everyday life with increased intentionality, consistency, and efficacy in support of their psychiatric recovery    Treatment Plan:  Patient has a current master individualized treatment plan.  See Epic treatment plan for more information.    Drew Del Rio, OTR/L

## 2023-04-04 NOTE — PROGRESS NOTES
Acknowledgement of Current Treatment Plan       I have reviewed my treatment plan with my therapist on 4/4/23.   I agree with the plan as it is written in the electronic health record. (3A)    Name:      Signature:  Everette Mayen Unable to sign due to virtual but verbally agreed with the plan     Jt Winston MD  Psychiatrist/Medical Director    NICOLE Reynoso on 4/5/2023 at 8:41 AM     Souleymane Del Rio OTR/ALYCIA Del Rio OTR/L on 4/4/2023 at 1:45 PM

## 2023-04-04 NOTE — GROUP NOTE
Psychotherapy Group Note    PATIENT'S NAME: Everette Mayen  MRN:   [id number]  :   1975  ACCT. NUMBER: [id number]  DATE OF SERVICE: 23  START TIME: 10:00 AM  END TIME: 10:50 AM  FACILITATOR: Celsa Arguello LGSW  TOPIC: MH EBP Group: Symptom Awareness  [facility] Adult Mental Health Day Treatment  TRACK: ADT 3A    NUMBER OF PARTICIPANTS: 3    Summary of Group / Topics Discussed:  Symptom Awareness: Mood Disorders: Patients received a general overview of mood disorders including depressive disorders, anxiety disorders, and bipolar disorders and how it relates to their current symptoms. The purpose is to promote understanding of their diagnoses and how it impacts their functioning. Patients reviewed their current awareness of symptoms and diagnoses. Patients received information regarding diagnoses, etiology, cultural, and environmental factors as well as impact on functioning.     Patient Session Goals / Objectives:    Discussed patient individual symptoms and experiences    Reviewed diagnostic criteria and etiology of diagnoses                                       Service Modality:  Video Visit     Telemedicine Visit: The patient's condition can be safely assessed and treated via synchronous audio and visual telemedicine encounter.      Reason for Telemedicine Visit: Services only offered telehealth    Originating Site (Patient Location): Patient's home    Distant Site (Provider Location): Provider Remote Setting- Home Office    Consent:  The patient/guardian has verbally consented to: the potential risks and benefits of telemedicine (video visit) versus in person care; bill my insurance or make self-payment for services provided; and responsibility for payment of non-covered services.     Patient would like the video invitation sent by:  My Chart    Mode of Communication:  Video Conference via Medical Zoom    As the provider I attest to compliance with applicable laws and regulations related  to telemedicine.                               Patient Participation / Response:  Fully participated with the group by sharing personal reflections / insights and openly received / provided feedback with other participants.    Demonstrated understanding of topics discussed through group discussion and participation, Demonstrated understanding of how information regarding symptoms can assist in management of symptoms, Identified / Expressed personal readiness to increase awareness of symptoms and apply skills as necessary, Verbalized understanding of how awareness can benefit mental health symptoms  and Identified plan to address barriers to increase awareness and knowledge about diagnoses and symptoms     Treatment Plan:  Patient has a current master individualized treatment plan.  See Epic treatment plan for more information.    Celsa Arguello LGSW

## 2023-04-05 ENCOUNTER — TELEPHONE (OUTPATIENT)
Dept: BEHAVIORAL HEALTH | Facility: CLINIC | Age: 48
End: 2023-04-05
Payer: COMMERCIAL

## 2023-04-05 ENCOUNTER — PATIENT OUTREACH (OUTPATIENT)
Dept: ONCOLOGY | Facility: CLINIC | Age: 48
End: 2023-04-05
Payer: COMMERCIAL

## 2023-04-05 NOTE — PROGRESS NOTES
New Patient Hematology / Oncology Nurse Navigator Note     Referral Date: 4/4/23    Referring provider: Self-referral to Dr. Hernandez's dysplasia clinic     Referring Clinic/Organization: Self Referred     Referred to: GynOnc    Requested provider (if applicable): Dr. Hernandez    Evaluation for : Dysplasia     Clinical History (per Nurse review of records provided):      3/15 PAP/HPV:  Component Ref Range & Units      Other HR HPV Negative Positive Abnormal      HPV16 DNA Negative Negative     HPV18 DNA Negative Negative     FINAL DIAGNOSIS     This patient's sample is positive for other HR HPV DNA (types 31, 33, 35, 39, 45, 51, 52, 56, 58, 59, 66 or 68), not HPV 16 or HPV 18 DNA. This result requires clinical correlation with concurrent cytology findings.     Component Ref Range & Units      Other HR HPV Negative Positive Abnormal      HPV16 DNA Negative Negative     HPV18 DNA Negative Negative     FINAL DIAGNOSIS     This patient's sample is positive for other HR HPV DNA (types 31, 33, 35, 39, 45, 51, 52, 56, 58, 59, 66 or 68), not HPV 16 or HPV 18 DNA. This result requires clinical correlation with concurrent cytology findings.       3/15 visit with Midwife -- BOOKMARKED    Clinical Assessment / Barriers to Care (Per Nurse):  Pt lives in [place], [place]      Records Location: [facility]     Records Needed:   N/A    Additional testing needed prior to consult:   N/A    Referral updates and Plan:   OUTGOING CALL to pt:  Introdu[facility] role [facility] and that we have recd the self-referral to Dr. Hernandez's dysplasia clinic (HPV+)  Pt confirms they are aware of the referral and ready to schedule  Provided contact information if future questions arise.     -Transferred pt to NPS line [phone] to schedule appt per scheduling instructions. Will arrange consult on 5/18 Dysplasia clinic in [place] with Dr. David Mackey, BSN, RN, PHN, OCN  Hematology/Oncology Nurse  [facility]  [phone]

## 2023-04-06 ENCOUNTER — HOSPITAL ENCOUNTER (OUTPATIENT)
Dept: BEHAVIORAL HEALTH | Facility: CLINIC | Age: 48
Discharge: HOME | End: 2023-04-06
Attending: PSYCHIATRY & NEUROLOGY
Payer: COMMERCIAL

## 2023-04-06 PROCEDURE — 90853 GROUP PSYCHOTHERAPY: CPT | Mod: GT

## 2023-04-06 PROCEDURE — G0177 OPPS/PHP; TRAIN & EDUC SERV: HCPCS | Mod: 95

## 2023-04-06 NOTE — GROUP NOTE
Psychotherapy Group Note    PATIENT'S NAME: Everette Mayen  MRN:   [id number]  :   1975  ACCT. NUMBER: [id number]  DATE OF SERVICE: 23  START TIME: 11:00 AM  END TIME: 11:50 AM  FACILITATOR: Dorina Cruz LGSW  TOPIC: MH EBP Group: Relationship Skills  [facility] Adult Mental Health Day Treatment  TRACK: 5A    NUMBER OF PARTICIPANTS: 5    Summary of Group / Topics Discussed:  Relationship Skills: Assertive Communication: Patients were provided with a general overview of assertive communication skills and how practicing assertive communication skills will assist patients in developing healthier and more effective relationships. Patients reviewed their current awareness on ability to practice assertive communication, ways to increase assertive communication, and identified/problem solved barriers to assertive communication.     Patient Session Goals / Objectives:    Identified and discussed patient individual challenges with communication    Presented and practiced effective communication skills in session    Assisted patients in implementing more effective communication skills in their relationships  Service Modality:  Video Visit     Telemedicine Visit: The patient's condition can be safely assessed and treated via synchronous audio and visual telemedicine encounter.      Reason for Telemedicine Visit: Services only offered telehealth    Originating Site (Patient Location): Patient's home    Distant Site (Provider Location): Provider Remote Setting- Home Office    Consent:  The patient/guardian has verbally consented to: the potential risks and benefits of telemedicine (video visit) versus in person care; bill my insurance or make self-payment for services provided; and responsibility for payment of non-covered services.     Patient would like the video invitation sent by:  My Chart    Mode of Communication:  Video Conference via Medical Zoom    As the provider I attest to compliance with  applicable laws and regulations related to telemedicine.         Patient Participation / Response:  Fully participated with the group by sharing personal reflections / insights and openly received / provided feedback with other participants.    Demonstrated understanding of topics discussed through group discussion and participation, Demonstrated understanding of relationship skills and communication skills and Identified / Expressed personal readiness to incorporate effective communication skills    Treatment Plan:  Patient has a current master individualized treatment plan.  See Epic treatment plan for more information.    Dorina Cruz LGSW

## 2023-04-06 NOTE — GROUP NOTE
"Process Group Note    PATIENT'S NAME: Everette Mayen  MRN:   [id number]  :   1975  ACCT. NUMBER: [id number]  DATE OF SERVICE: 23  START TIME:  9:00 AM  END TIME:  9:50 AM  FACILITATOR: Dorina Cruz LGSW  TOPIC:  Process Group    Diagnoses:  296.89 Bipolar II Disorder Hypomanic  296.32 (F33.1) Major Depressive Disorder, Recurrent Episode, Moderate _ and With melancholic features.  300.02 (F41.1) Generalized Anxiety Disorder.      [facility] Day Treatment  TRACK: 3A    NUMBER OF PARTICIPANTS: 1 3A client joined 4 5A clients    Service Modality:  Video Visit     Telemedicine Visit: The patient's condition can be safely assessed and treated via synchronous audio and visual telemedicine encounter.      Reason for Telemedicine Visit: Services only offered telehealth    Originating Site (Patient Location): Patient's home    Distant Site (Provider Location): Provider Remote Setting- Home Office    Consent:  The patient/guardian has verbally consented to: the potential risks and benefits of telemedicine (video visit) versus in person care; bill my insurance or make self-payment for services provided; and responsibility for payment of non-covered services.     Patient would like the video invitation sent by:  My Chart    Mode of Communication:  Video Conference via Medical Zoom    As the provider I attest to compliance with applicable laws and regulations related to telemedicine.         Data:    Session content: At the start of this group, patients were invited to check in by identifying themselves, describing their current emotional status, and identifying issues to address in this group.   Area(s) of treatment focus addressed in this session included Symptom Management, Personal Safety and Community Resources/Discharge Planning.  Reported mood is \"centered and optimistic.\"  Client denied safety concerns, including SI and SIB. Client denies any chemical use.  Client is " "taking medications as prescribed. Client talked about feeling suicidal and in crisis on Saturday. She took a knife from the kitchen and put it beside her bed. Client reported she \"white knuckled\" through the feelings and went to sleep to numb or cope with them. Client reported she felt better each day following. Client reported Latuda and Trazodone are helping her sleep and maybe improving her Bipolar. Client reported she has not taken leave yet from work and needs to find balance and ask for help. 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 Client reported she plans to see parents in [place] and scatter some on son's ashes. Client's goal is to \"focus and find better balance.\" Client stated she will miss the next two weeks of group due to work. Client will apply the following skills: use support nerwork.  Client is proud of asking for support.  Client is grateful for feeling better. Peers offered supportive feedback and validation.      Therapeutic Interventions/Treatment Strategies:  Psychotherapist offered support, feedback and validation. Treatment modalities used include Cognitive Behavioral Therapy. Interventions include Behavioral Activation: Encouraged strategies to reduce individual procrastination and increase motivation by increasing goal-directed activities to enhance mood and reduce symptoms. and Relationship Skills: asking fro help from support system.    Assessment:    Patient response:   Patient responded to session by accepting feedback, giving feedback, listening and focusing on goals    Possible barriers to participation / learning include: and no barriers identified    Health Issues:   None reported       Substance Use Review:   Substance Use: No active concerns identified.    Mental Status/Behavioral Observations  Appearance:   Appropriate   Eye Contact:   Good   Psychomotor Behavior: Normal   Attitude:   Cooperative  Interested Friendly  Orientation:   All  Speech   Rate / " Production: Normal    Volume:  Normal   Mood:    Depressed   Affect:    Appropriate   Thought Content:   Clear and Safety denies any current safety concerns including suicidal ideation, self-harm, and homicidal ideation  Thought Form:  Coherent  Logical     Insight:    Good     Plan:     Safety Plan: No current safety concerns identified.  Recommended that patient call 911 or go to the local ED should there be a change in any of these risk factors.     Barriers to treatment: None identified    Patient Contracts (see media tab):  None    Substance Use: Not addressed in session     Continue or Discharge: Patient will continue in Adult Day Treatment (ADT)  as planned. Patient is likely to benefit from learning and using skills as they work toward the goals identified in their treatment plan.      Dorina Cruz, NICOLE  April 6, 2023

## 2023-04-06 NOTE — GROUP NOTE
Psychoeducation Group Note    PATIENT'S NAME: Everette Mayen  MRN:   [id number]  :   1975  ACCT. NUMBER: [id number]  DATE OF SERVICE: 23  START TIME: 10:00 AM  END TIME: 10:50 AM  FACILITATOR: Alvaro Alonzo RN  TOPIC: MH Wellness Group: Mental Health Maintenance                                    Service Modality:  Video Visit     Telemedicine Visit: The patient's condition can be safely assessed and treated via synchronous audio and visual telemedicine encounter.      Reason for Telemedicine Visit: Patient has requested telehealth visit    Originating Site (Patient Location): Patient's home    Distant Site (Provider Location): Provider Remote Setting- Home Office    Consent:  The patient/guardian has verbally consented to: the potential risks and benefits of telemedicine (video visit) versus in person care; bill my insurance or make self-payment for services provided; and responsibility for payment of non-covered services.     Patient would like the video invitation sent by:  My Chart    Mode of Communication:  Video Conference via Medical Zoom    As the provider I attest to compliance with applicable laws and regulations related to telemedicine.                           [facility] Adult Mental Health Day Treatment  TRACK: 5a/3a    NUMBER OF PARTICIPANTS: 5    Summary of Group / Topics Discussed:  Mental Health Maintenance:  Stigma: In this group patients explored stigma surrounding a mental health diagnosis.  The group discussed the way stigma impacts their own life, and discussed strategies to reduce. The relationship between physical and mental health were also explored in the context of healthcare access, treatment, and support.    Patient Session Goals / Objectives:  ? Patients identified the importance of practicing emotional hygiene  ? Patients identified ways to decrease the  impact of stigma in their own life        Patient Participation / Response:  Fully participated with the  group by sharing personal reflections / insights and openly received / provided feedback with other participants.    Demonstrated understanding of topics discussed through group discussion and participation    Treatment Plan:  Patient has a current master individualized treatment plan.  See Epic treatment plan for more information.    Alvaro Alonzo RN

## 2023-04-11 NOTE — TELEPHONE ENCOUNTER
RECORDS STATUS - ALL OTHER DIAGNOSIS      RECORDS RECEIVED FROM: Epic   DATE RECEIVED:    NOTES STATUS DETAILS   OFFICE NOTE from referring provider SELF    OFFICE NOTE from medical oncologist     OFFICE NOTE from other specialist Epic 06/02/16: Dr. Jud Thomson   MEDICATION LIST [facility]    LABS     PATHOLOGY REPORTS Reports in EPIC HPV: 03/15/23, 10/27/20, 06/02/16    03/15/23: [id number]   ANYTHING RELATED TO DIAGNOSIS Epic Most recent 03/27/23     
Ambulatory

## 2023-04-21 ENCOUNTER — TELEPHONE (OUTPATIENT)
Dept: BEHAVIORAL HEALTH | Facility: CLINIC | Age: 48
End: 2023-04-21
Payer: COMMERCIAL

## 2023-04-21 ENCOUNTER — HOSPITAL ENCOUNTER (OUTPATIENT)
Dept: BEHAVIORAL HEALTH | Facility: CLINIC | Age: 48
Discharge: HOME | End: 2023-04-21
Attending: PSYCHIATRY & NEUROLOGY
Payer: COMMERCIAL

## 2023-04-21 DIAGNOSIS — F31.81 BIPOLAR 2 DISORDER (H): Primary | ICD-10-CM

## 2023-04-21 DIAGNOSIS — F31.76 BIPOLAR DISORDER, IN FULL REMISSION, MOST RECENT EPISODE DEPRESSED (H): ICD-10-CM

## 2023-04-21 PROCEDURE — 99205 OFFICE O/P NEW HI 60 MIN: CPT | Mod: VID | Performed by: PSYCHIATRY & NEUROLOGY

## 2023-04-21 RX ORDER — LURASIDONE HYDROCHLORIDE 20 MG/1
20 TABLET, FILM COATED ORAL
Qty: 30 TABLET | Refills: 3 | Status: SHIPPED | OUTPATIENT
Start: 2023-04-21 | End: 2023-08-04

## 2023-04-21 RX ORDER — TRAZODONE HYDROCHLORIDE 50 MG/1
50 TABLET, FILM COATED ORAL AT BEDTIME
Qty: 30 TABLET | Refills: 3 | Status: SHIPPED | OUTPATIENT
Start: 2023-04-21 | End: 2023-08-04

## 2023-04-21 ASSESSMENT — ANXIETY QUESTIONNAIRES
4. TROUBLE RELAXING: SEVERAL DAYS
7. FEELING AFRAID AS IF SOMETHING AWFUL MIGHT HAPPEN: SEVERAL DAYS
GAD7 TOTAL SCORE: 8
5. BEING SO RESTLESS THAT IT IS HARD TO SIT STILL: SEVERAL DAYS
6. BECOMING EASILY ANNOYED OR IRRITABLE: SEVERAL DAYS
3. WORRYING TOO MUCH ABOUT DIFFERENT THINGS: SEVERAL DAYS
GAD7 TOTAL SCORE: 8
2. NOT BEING ABLE TO STOP OR CONTROL WORRYING: MORE THAN HALF THE DAYS
1. FEELING NERVOUS, ANXIOUS, OR ON EDGE: SEVERAL DAYS
7. FEELING AFRAID AS IF SOMETHING AWFUL MIGHT HAPPEN: SEVERAL DAYS
GAD7 TOTAL SCORE: 8

## 2023-04-21 ASSESSMENT — PATIENT HEALTH QUESTIONNAIRE - PHQ9
SUM OF ALL RESPONSES TO PHQ QUESTIONS 1-9: 9
SUM OF ALL RESPONSES TO PHQ QUESTIONS 1-9: 9
10. IF YOU CHECKED OFF ANY PROBLEMS, HOW DIFFICULT HAVE THESE PROBLEMS MADE IT FOR YOU TO DO YOUR WORK, TAKE CARE OF THINGS AT HOME, OR GET ALONG WITH OTHER PEOPLE: SOMEWHAT DIFFICULT

## 2023-04-21 NOTE — H&P
"[facility]   Adult Mental Health Outpatient Programs  Provider Intake Note    Program (track): Adult Day Treatment (track 5A)    Patient: Everette Mayen  MRN: [id number]  : 1975  Acct. No.: [id number]  Date of Service:  23  Session Start Time:  902  Session End Time:  955    Diagnostic Assessment Date: 3/29/2023    Outpatient Providers:  Current Outpatient Psychiatric Provider: none, previously Dr. Eli Kaur (retired/practice closed)   Current Outpatient Individual Psychotherapist: Wade Savage PsyD, LMFT    Primary Care Provider: Katelynn Schwartz     Identifying Data:  Everette Mayen, a 47 year old-year-old with reported history of an anxiety disorder; a bipolar disorder; depression, presents for initial visit to provide oversight of programmatic care. Patient attended the phone/video session alone, uses she/her pronouns, and prefers to be called: \"Mable\"    Presenting Concern:  \"I've been without a psychiatrist for quite some time.\"   Per diagnostic assessment: \"Symptoms of bipolar disease and emotion regulation\"    History of Present Illness:  Chart reviewed, history as documented reviewed with Mable. Patient endorses:    Diagnosed with bipolar 2 disorder  o \"Very mild, not many symptoms, even after the death of my son\"    Early this year noted increase in symptoms consistent with manic episode  o Decrease in sleep, concentration  o Recognized symptoms, was trying to avoid progression  o Met a couple of times with psychiatric providers  - Poor interactions; patient did not end up restarting lithium     \"Then full-on went into a hypomania in February this year\"  o \"Wanted to run away, move to [place], get rid of all the men in my life\"  o \"Did some of that, [then] realized I was in an episode\"    \"Then I fell off a alistair\"  o Friends took patient to ED in response to some of the things the patient was saying at work  - \"I was ideating\"  - Also " "was, \"feeling shame, fought going to the ER\"  o Was sleeping 3h/night  o \"Lost 2 months of work; just couldn't concentrate\"    Started lurasidone in ED  o \"That has been wonderful\"  - Helped with sleep  - Currently taking trazodone in combination with the lurasidone   - Still wake up around 3-4  - Generally able to get back to sleep without too much difficulty    Hospitalized 3 times in her life  o \"Each during transitions\"  - While in undergraduate, following a breakup  - While finishing graduate school and contemplating moving to another state  - 3rd a few years ago while her son was struggling and patient was making other transitions    Now in day treatment at [place], and, \"I am enjoying it so much\"  o Has missed groups for the past 2 weeks due to     Review of Symptoms   Review of systems as recorded in diagnostic assessment reviewed with patient.  Today notes:    Sleep: per above  o Note: patient was woken between 3 and 4 am a couple of years ago to be informed that her son had , so she often wakes at that time  o Can typically fall back to sleep without additional difficulty    \"When I get depressed it never manifests as not being able to do anything\"    Generally finding symptoms are under good control with current regimen    Activities of Daily Living and Related Systems:    Hygiene: no difficulties endorsed    Socialization: no difficulties endorsed    Chores and home upkeep: no difficulties endorsed    Shopping; no difficulties endorsed    Concerns related to work: no difficulties endorsed    Safety Assessment:    Suicidal ideation: denies current or recent suicidal ideation or behavior    Thoughts of non-suicidal self-injury: denied    Recent self-injurious behavior: denied    Homicidal ideation: denied    Other safety concerns: denied    Substance use:    None endorsed    Medications:  Current Outpatient Medications   Medication Sig Dispense Refill     adalimumab (HUMIRA) 40 MG/0.8ML prefilled " syringe kit Inject 0.8 mLs (40 mg) Subcutaneous every 14 days 2 each 5     amLODIPine-benazepril (LOTREL) 5-10 MG capsule Take 1 capsule by mouth daily 90 capsule 3     clobetasol (TEMOVATE) 0.05 % external ointment Apply topically 2 times daily as needed (Psoriasis) 60 g 11     lurasidone (LATUDA) 20 MG TABS tablet Take 1 tablet (20 mg) by mouth daily with food 30 tablet 3     traZODone (DESYREL) 50 MG tablet Take 1 tablet (50 mg) by mouth At Bedtime 30 tablet 3       The above list was reviewed and updated in [facility] with patient today.     Patient is taking medications as prescribed and denies adverse effects    Medical Review of Systems:  Pertinent: None    Recent Screenings and Metrics:       View : No data to display.                PHQ-9 scores:       10/27/2020     3:19 PM 3/15/2023     1:39 PM 3/29/2023     9:59 AM 4/21/2023     9:02 AM   PHQ-9 SCORE   PHQ-9 Total Score MyChart   17 (Moderately severe depression) 9 (Mild depression)   PHQ-9 Total Score 10 17 17 9       AVNI-7 scores:       10/27/2020     3:19 PM 3/15/2023     1:39 PM 3/29/2023    10:01 AM   AVNI-7 SCORE   Total Score   16 (severe anxiety)   Total Score 6 16 16       CSSR-S: [place] Suicide Severity Rating Scale (Lifetime/Recent)      8/31/2015     1:00 PM 3/27/2023    12:47 PM 3/27/2023     4:00 PM 3/29/2023    10:00 AM   [place] Suicide Severity Rating (Lifetime/Recent)   Q1 Wished to be Dead (Past Month)  yes  yes   Q2 Suicidal Thoughts (Past Month)  yes  yes   Q3 Suicidal Thought Method  no  no   Q4 Suicidal Intent without Specific Plan  no  no   Q5 Suicide Intent with Specific Plan  no  no   Q6 Suicide Behavior (Lifetime)  no  no   Level of Risk per Screen  low risk  low risk   Do you have guns available to you? No      1. Wish to be Dead (Lifetime)   N    2. Non-Specific Active Suicidal Thoughts (Lifetime)   N    Actual Attempt (Lifetime)   N    Has subject engaged in non-suicidal self-injurious behavior? (Lifetime)   N    Interrupted  "Attempts (Lifetime)   N    Aborted or Self-Interrupted Attempt (Lifetime)   N    Preparatory Acts or Behavior (Lifetime)   N    Calculated C-SSRS Risk Score (Lifetime/Recent)   No Risk Indicated         Psychiatric History:   Outpatient providers listed above.    Past medication trials include:    Lithium     Otherwise as noted above or in diagnostic assessment.     Substance Use History:  As noted above and/or in diagnostic assessment.     Past Medical History:  As noted above or in diagnostic assessment.     Vital Signs:  None since this is a phone/video visit.     Labs:  Most recent labs reviewed. Pertinent updates/findings: None.     Family History:   As noted above or in diagnostic assessment.     Social History:   As noted above or in diagnostic assessment.     Legal History:  As noted above or in diagnostic assessment.     Significant Losses / Trauma / Abuse / Neglect Issues:  As noted above or in diagnostic assessment.     Mental Status Examination (limited due to video virtual visit format):  Vital Signs: There were no vitals taken for this virtual visit.  Appearance: well groomed, appears stated age, and in no apparent distress.  Attitude: cooperative   Eye Contact: good to the extent that can be determined in a video visit  Muscle Strength and Tone: no gross abnormalities based on remote observation  Psychomotor Behavior: Appropriate and Calm; no evidence of tardive dyskinesia, dystonia, or tics based on remote observation  Gait and Station: normal, no gross abnormalities based on remote observation  Speech: normal rate, production, volume, and rhythm of  Associations: No loosening of associations  Thought Process: coherent and goal directed  Thought Content: no evidence of suicidal ideation or homicidal ideation, no evidence of psychotic thought, no auditory hallucinations present and no visual hallucinations present  Mood: \"better\"  Affect: mood congruent, intensity is normal and reactive  Insight: " excellent  Judgment: intact, adequate for safety  Impulse Control: intact  Oriented to: time, place, person and situation  Attention Span and Concentration: normal  Language: Intact  Recent and Remote Memory: intact to interview. Not formally assessed. No amnesia.  Fund of Knowledge/Assessment of Intelligence: Above average  Capacity of Activities of Daily Living: Independent, able to participate in programmatic care services.    DSM5 Diagnosis/es:    ICD-10-CM    1. Bipolar 2 disorder (H)  F31.81         Have not ruled out  300.02 (F41.1) Generalized Anxiety Disorder per diagnostic assessment     Assessment/Plan:  Mable presents today for initial provider visit as part of program intake, coordination, and supervision.     Symptoms have largely remitted since starting lurasidone and patient is making good progress in therapy with continuing to identify stressors and triggers. Patient identifies shame and past trauma with ongoing difficulty and it appears that some of the sexual activity driven by the recent manic episode also served as trauma triggers. Patient is exploring EMDR and will benefit from continued group therapy as she continues to develop a healthier sense of self. Continue IOP.     Given ongoing improvement, no change to medication is indicated today. New refills sent. Referral sent for outpatient psychiatry today. Patient furnished with number for resident clinic at [facility] as well. Follow up with me in one month.       Bipolar disorder   o Improved  o No changes to medication today  o Continue day treatment/intensive outpatient    Safety Discussion:    Today Mable did not endorse any suicidal ideation, and endorses neither non-suicidal self-injury nor thoughts thereof    Mable is future-oriented and engaged in treatment planning     I do not feel that Mable meets criteria for a 72-hour involuntary hold and remains appropriate for an outpatient level of care.     Continue therapy as planned:    Enrolled in  adult day treatment     Patient continues to meet criteria for recommended level of care.    Patient is expected to make a timely and significant improvement in the presenting acute symptoms as a result of participation in this program.    Patient would be at reasonable risk of requiring a higher level of care in the absence of current services.    Continue with individual therapist as appropriate    Safety plan reviewed.     To the Emergency Department as needed or call after hours crisis line at [phone] or [phone]. [place] Crisis Text Line: Text [place] to 152812  or  Suicide LifeLine Chat: suicideSportsCrunch.org/chat    Follow-up:     schedule an appointment with me or another program provider in approximately in 4 week(s) or sooner if needed.  Can speak with a staff member or call the appropriate program number (see below) to schedule    Follow up with outpatient provider(s) as planned or sooner if needed for acute medical concerns.    Questions or concerns:    Call program line with questions or concerns (see below)    AdRockethart may be used to communicate with your provider, but this is not intended to be used for emergencies.    [facility] Adult Mental Health Program lines:  [facility]: [phone]  Dual Disorder: [phone]  Adult Day Treatment:  [phone]  55+/Intensive Outpatient: [phone]    Community Resources:    National Suicide Prevention Lifeline: 988 from any phone, or 272-754-0874 (TTY: 403.652.5829). Call anytime for help.  (www.suicidepreventionlifeline.org)  [employer] [place] Mental Illness (www.tiny.org): 135.990.2515 or 374-882-6721.   Mental Health Association (www.mentalhealth.org): 303.459.4514 or 849-454-6010.  [place] Crisis Text Line: Text [place] to 538338  Suicide LifeLine Chat: suicideSportsCrunch.org/chat    Treatment Objective(s) Addressed in This Session:  One purpose of today's call is for this writer to provide oversight of patient's care  while receiving program services. Specific treatment goals addressed included personal safety, symptoms stabilization and management, wellness and mental health, and community resources/discharge planning.     Patient agrees with the current plan of care.    Signed:   Jt Winston MD   April 21, 2023      Visit Details:  Type of service:  Video Visit    Start/End Time: see above    Originating Location (pt. Location): Home in [place]    Distant Location (provider location): Provider Remote Setting- Home Office    Platform used for Video Visit: Zoom    Physician has received verbal consent for a Video Visit from the patient? Yes    80 minutes spent on the date of the encounter doing chart review, patient visit, documentation and discussion with other provider(s)     This document completed in part using Dragon Medical One dictation software.  Please excuse any inadvertent word or phrase substitutions.

## 2023-04-21 NOTE — TELEPHONE ENCOUNTER
----- Message from NICOLE Riggins sent at 4/20/2023  3:56 PM CDT -----  Regarding: Transfer patient from 3A to 5A  Patient: ANTONINO ADAM BA [[id number]]  Location of programming: virtual  Start Date: April / 24 / 2023  Group: [facility] on Monday, Tuesday, and Thursday at 9:00 AM to 12:00 PM  Attending Provider (MD): Catrachito  Number of visits to be scheduled: 27  Duration of Appointment in minutes: 180  Visit Type: Zoom - 2659

## 2023-04-21 NOTE — TELEPHONE ENCOUNTER
----- Message from FLAKITA Luna sent at 4/20/2023  3:47 PM CDT -----  Regarding: Patient was placed in ADT, do they need IOP  Hi,    Not sure if patient had different insurance when they did intake with you Marty- she was placed in ADT 3A but looks like has IOP insurance.     UR, can you please verify?    Thanks,    FLAKITA Adames on 4/20/2023 at 3:48 PM

## 2023-04-21 NOTE — TELEPHONE ENCOUNTER
Writer contacted patient to initiated track change from DT to IOP. Requested a call back to [phone].    FLAKITA Adames on 4/21/2023 at 10:02 AM

## 2023-04-24 NOTE — TELEPHONE ENCOUNTER
----- Message from FLAKITA Luna sent at 2023  9:18 AM CDT -----  Regarding: Patient transferring from 5A to 2A  Scheduling Request    Patient Name: Everette Mayen  Location of programminA  Start Date:   Group: ADT 2A on Monday, Tuesday, and Thursday at 09:00 AM to 12:00 PM  Attending Provider (MD): Dr. Winston  Number of visits to be scheduled: 24  Duration of Appointment in minutes: 180  Visit Type: Zoom - [id number] through , then switch to in-person programming    Additional notes: please note change from virtual to in-person next month.    Thanks,    FLAKITA Adames on 2023 at 9:20 AM

## 2023-04-25 ENCOUNTER — HOSPITAL ENCOUNTER (OUTPATIENT)
Dept: BEHAVIORAL HEALTH | Facility: CLINIC | Age: 48
Discharge: HOME | End: 2023-04-25
Attending: PSYCHIATRY & NEUROLOGY
Payer: COMMERCIAL

## 2023-04-25 PROCEDURE — 90853 GROUP PSYCHOTHERAPY: CPT | Mod: GT

## 2023-04-25 NOTE — GROUP NOTE
Psychotherapy Group Note    PATIENT'S NAME: Everette Mayen  MRN:   [id number]  :   1975  ACCT. NUMBER: [id number]  DATE OF SERVICE: 23  START TIME: 11:00 AM  END TIME: 11:50 AM  FACILITATOR: Tayla Moran LICSW  TOPIC: MH EBP Group: Cognitive Restructuring  [facility] Adult Mental Health Day Treatment  TRACK: 2A                                    Service Modality:  Video Visit     Telemedicine Visit: The patient's condition can be safely assessed and treated via synchronous audio and visual telemedicine encounter.      Reason for Telemedicine Visit: Services only offered telehealth    Originating Site (Patient Location): Patient's home    Distant Site (Provider Location): [facility] Outpatient Setting: [facility]     Consent:  The patient/guardian has verbally consented to: the potential risks and benefits of telemedicine (video visit) versus in person care; bill my insurance or make self-payment for services provided; and responsibility for payment of non-covered services.     Patient would like the video invitation sent by:  My Chart    Mode of Communication:  Video Conference via Medical Zoom    As the provider I attest to compliance with applicable laws and regulations related to telemedicine.         NUMBER OF PARTICIPANTS: 6    Summary of Group / Topics Discussed:  Cognitive Restructuring: Distortions: Patients received an overview of how to identify common cognitive distortions. Patients will explore alternatives to cognitive distortions and practice challenging their negative thought patterns. The goal is to help patients target modify ineffective thought patterns.     Patient Session Goals / Objectives:    Familiarized self with ineffective / unhealthy thoughts and how they develop.      Explored impact of ineffective thoughts / distortions on mood and activity    Formulated new neutral/positive alternatives to challenge less helpful / ineffective thoughts.    Practiced and  plan to apply in daily life               Patient Participation / Response:  Fully participated with the group by sharing personal reflections / insights and openly received / provided feedback with other participants.    Demonstrated understanding of topics discussed through group discussion and participation, Expressed understanding of the relationship between behaviors, thoughts, and feelings and Verbalized understanding of patient's own thought patterns and how they impact their mood and behaviors    Treatment Plan:  Patient has a current master individualized treatment plan.  See Epic treatment plan for more information.    Tayla Carey, TERESITASW

## 2023-04-25 NOTE — GROUP NOTE
Psychotherapy Group Note    PATIENT'S NAME: Everette Mayen  MRN:   [id number]  :   1975  ACCT. NUMBER: [id number]  DATE OF SERVICE: 23  START TIME: 10:00 AM  END TIME: 10:50 AM  FACILITATOR: Nirmala Jordan LMFT  TOPIC: MH EBP Group: Cognitive Restructuring  [facility] Adult Mental Health Day Treatment  TRACK: 2A    NUMBER OF PARTICIPANTS: 7                                      Service Modality:  Video Visit     Telemedicine Visit: The patient's condition can be safely assessed and treated via synchronous audio and visual telemedicine encounter.      Reason for Telemedicine Visit: Patient has requested telehealth visit    Originating Site (Patient Location): Patient's home    Distant Site (Provider Location): Provider Remote Setting- Home Office    Consent:  The patient/guardian has verbally consented to: the potential risks and benefits of telemedicine (video visit) versus in person care; bill my insurance or make self-payment for services provided; and responsibility for payment of non-covered services.     Patient would like the video invitation sent by:  My Chart    Mode of Communication:  Video Conference via Medical Zoom    As the provider I attest to compliance with applicable laws and regulations related to telemedicine.             Summary of Group / Topics Discussed:  Cognitive Restructuring: Core Beliefs: Patients received an overview of what a core belief is, and how they develop. Patients then began to identify their negative core beliefs. Patients worked to modify core beliefs with the goal of improved self-image and functioning.     Patient Session Goals / Objectives:    Familiarize self with the concept of core beliefs and how they develop.      Explore personal core beliefs (positive and negative)    Develop / advance recognition of the connection between negative thoughts and negative core beliefs.    Formulate new neutral/positive core beliefs               Patient  Participation / Response:  Fully participated with the group by sharing personal reflections / insights and openly received / provided feedback with other participants.    Demonstrated understanding of topics discussed through group discussion and participation, Expressed understanding of the relationship between behaviors, thoughts, and feelings and Demonstrated knowledge of personal thought patterns and how they impact their mood and behavior.    Treatment Plan:  Patient has a current master individualized treatment plan.  See Epic treatment plan for more information.    Nirmaal Jordan LMFT

## 2023-04-25 NOTE — GROUP NOTE
Process Group Note    PATIENT'S NAME: Everette Mayen  MRN:   [id number]  :   1975  ACCT. NUMBER: [id number]  DATE OF SERVICE: 23  START TIME:  9:00 AM  END TIME:  9:50 AM  FACILITATOR: Nirmala Jordan LMFT  TOPIC:  Process Group    Diagnoses:  296.89 Bipolar II Disorder Hypomanic  296.32 (F33.1) Major Depressive Disorder, Recurrent Episode, Moderate _ and With melancholic features.  300.02 (F41.1) Generalized Anxiety Disorder.      [facility] Mental Health Day Treatment  TRACK: 2A    NUMBER OF PARTICIPANTS: 7                                      Service Modality:  Video Visit     Telemedicine Visit: The patient's condition can be safely assessed and treated via synchronous audio and visual telemedicine encounter.      Reason for Telemedicine Visit: Patient has requested telehealth visit    Originating Site (Patient Location): Patient's home    Distant Site (Provider Location): Provider Remote Setting- Home Office    Consent:  The patient/guardian has verbally consented to: the potential risks and benefits of telemedicine (video visit) versus in person care; bill my insurance or make self-payment for services provided; and responsibility for payment of non-covered services.     Patient would like the video invitation sent by:  My Chart    Mode of Communication:  Video Conference via Medical Zoom    As the provider I attest to compliance with applicable laws and regulations related to telemedicine.                                  Data:    Session content: At the start of this group, patients were invited to check in by identifying themselves, describing their current emotional status, and identifying issues to address in this group.   Area(s) of treatment focus addressed in this session included Symptom Management, Personal Safety, Develop / Improve Independent Living Skills and Develop Socialization / Interpersonal Relationship Skills.    Patient reported feeling good today.  Savannah reported sleeping well last night. Patient stated she is currently working on reducing shame. Skills are journaling and meditation. Savannah stated barriers are feeling well and not thinking she needs to continue working on things. Patient stated another skill is she made a decision to work hard on mental health. Patient denied safety concerns or chemical use. Savannah reported having a good day at work today and is excited to welcome a new group member.     Therapeutic Interventions/Treatment Strategies:  Psychotherapist offered support, feedback and validation and reinforced use of skills. Treatment modalities used include Motivational Interviewing, Cognitive Behavioral Therapy and Dialectical Behavioral Therapy. Interventions include Behavioral Activation: Explored how behaviors effect mood and interact with thoughts and feelings and Emotions Management:  Discussed barriers to emotional regulation and Increased awareness of daily mood patterns/changes.    Assessment:    Patient response:   Patient responded to session by accepting feedback, listening, being attentive and accepting support    Possible barriers to participation / learning include: and no barriers identified    Health Issues:   None reported       Substance Use Review:   Substance Use: No active concerns identified.    Mental Status/Behavioral Observations  Appearance:   Appropriate   Eye Contact:   Good   Psychomotor Behavior: Normal   Attitude:   Cooperative   Orientation:   All  Speech   Rate / Production: Normal    Volume:  Normal   Mood:    Normal  Affect:    Appropriate   Thought Content:   Clear  Thought Form:  Coherent  Logical     Insight:    Good     Plan:     Safety Plan: No current safety concerns identified.  Recommended that patient call 911 or go to the local ED should there be a change in any of these risk factors.     Barriers to treatment: None identified    Patient Contracts (see media tab):  None    Substance Use: Not  addressed in session     Continue or Discharge: Patient will continue in Adult Day Treatment (ADT)  as planned. Patient is likely to benefit from learning and using skills as they work toward the goals identified in their treatment plan.      MIREYA Perez  April 25, 2023

## 2023-04-27 ENCOUNTER — HOSPITAL ENCOUNTER (OUTPATIENT)
Dept: BEHAVIORAL HEALTH | Facility: CLINIC | Age: 48
Discharge: HOME | End: 2023-04-27
Attending: PSYCHIATRY & NEUROLOGY
Payer: COMMERCIAL

## 2023-04-27 ENCOUNTER — OFFICE VISIT (OUTPATIENT)
Dept: URGENT CARE | Facility: URGENT CARE | Age: 48
End: 2023-04-27
Payer: COMMERCIAL

## 2023-04-27 VITALS
HEART RATE: 70 BPM | HEIGHT: 62 IN | DIASTOLIC BLOOD PRESSURE: 88 MMHG | BODY MASS INDEX: 26.31 KG/M2 | SYSTOLIC BLOOD PRESSURE: 149 MMHG | RESPIRATION RATE: 16 BRPM | TEMPERATURE: 97.3 F | WEIGHT: 143 LBS | OXYGEN SATURATION: 97 %

## 2023-04-27 DIAGNOSIS — B37.31 CANDIDIASIS OF VAGINA: ICD-10-CM

## 2023-04-27 DIAGNOSIS — N30.01 ACUTE CYSTITIS WITH HEMATURIA: ICD-10-CM

## 2023-04-27 DIAGNOSIS — N89.8 VAGINAL DISCHARGE: ICD-10-CM

## 2023-04-27 DIAGNOSIS — R30.0 DYSURIA: Primary | ICD-10-CM

## 2023-04-27 LAB
ALBUMIN UR-MCNC: NEGATIVE MG/DL
APPEARANCE UR: CLEAR
BACTERIA #/AREA URNS HPF: ABNORMAL /HPF
BILIRUB UR QL STRIP: NEGATIVE
CLUE CELLS: ABNORMAL
COLOR UR AUTO: YELLOW
GLUCOSE UR STRIP-MCNC: NEGATIVE MG/DL
HGB UR QL STRIP: ABNORMAL
KETONES UR STRIP-MCNC: NEGATIVE MG/DL
LEUKOCYTE ESTERASE UR QL STRIP: ABNORMAL
MUCOUS THREADS #/AREA URNS LPF: PRESENT /LPF
NITRATE UR QL: NEGATIVE
PH UR STRIP: 6 [PH] (ref 5–7)
RBC #/AREA URNS AUTO: ABNORMAL /HPF
SP GR UR STRIP: 1.01 (ref 1–1.03)
SQUAMOUS #/AREA URNS AUTO: ABNORMAL /LPF
TRICHOMONAS, WET PREP: ABNORMAL
UROBILINOGEN UR STRIP-ACNC: 0.2 E.U./DL
WBC #/AREA URNS AUTO: ABNORMAL /HPF
WBC CLUMPS #/AREA URNS HPF: PRESENT /HPF
WBC'S/HIGH POWER FIELD, WET PREP: ABNORMAL
YEAST #/AREA URNS HPF: ABNORMAL /HPF
YEAST, WET PREP: PRESENT

## 2023-04-27 PROCEDURE — 81001 URINALYSIS AUTO W/SCOPE: CPT | Performed by: PHYSICIAN ASSISTANT

## 2023-04-27 PROCEDURE — 90853 GROUP PSYCHOTHERAPY: CPT | Mod: 95

## 2023-04-27 PROCEDURE — 87591 N.GONORRHOEAE DNA AMP PROB: CPT | Performed by: PHYSICIAN ASSISTANT

## 2023-04-27 PROCEDURE — 99213 OFFICE O/P EST LOW 20 MIN: CPT | Performed by: PHYSICIAN ASSISTANT

## 2023-04-27 PROCEDURE — 87210 SMEAR WET MOUNT SALINE/INK: CPT | Performed by: PHYSICIAN ASSISTANT

## 2023-04-27 PROCEDURE — 87491 CHLMYD TRACH DNA AMP PROBE: CPT | Performed by: PHYSICIAN ASSISTANT

## 2023-04-27 PROCEDURE — 90853 GROUP PSYCHOTHERAPY: CPT | Mod: GT

## 2023-04-27 PROCEDURE — 87086 URINE CULTURE/COLONY COUNT: CPT | Performed by: PHYSICIAN ASSISTANT

## 2023-04-27 RX ORDER — NITROFURANTOIN 25; 75 MG/1; MG/1
100 CAPSULE ORAL 2 TIMES DAILY
Qty: 14 CAPSULE | Refills: 0 | Status: SHIPPED | OUTPATIENT
Start: 2023-04-27 | End: 2023-05-04

## 2023-04-27 RX ORDER — FLUCONAZOLE 150 MG/1
150 TABLET ORAL
Qty: 3 TABLET | Refills: 0 | Status: SHIPPED | OUTPATIENT
Start: 2023-04-27 | End: 2023-05-04

## 2023-04-27 NOTE — GROUP NOTE
Psychotherapy Group Note    PATIENT'S NAME: Everette Mayen  MRN:   [id number]  :   1975  ACCT. NUMBER: [id number]  DATE OF SERVICE: 23  START TIME: 11:00 AM  END TIME: 11:50 AM  FACILITATOR: Tayla Moran LICSW  TOPIC: MH EBP Group: Cognitive Restructuring  [facility] Adult Mental Health Day Treatment  TRACK: 2A                                    Service Modality:  Video Visit     Telemedicine Visit: The patient's condition can be safely assessed and treated via synchronous audio and visual telemedicine encounter.      Reason for Telemedicine Visit: Services only offered telehealth    Originating Site (Patient Location): Patient's home    Distant Site (Provider Location): [facility] Outpatient Setting: [facility]     Consent:  The patient/guardian has verbally consented to: the potential risks and benefits of telemedicine (video visit) versus in person care; bill my insurance or make self-payment for services provided; and responsibility for payment of non-covered services.     Patient would like the video invitation sent by:  My Chart    Mode of Communication:  Video Conference via Medical Zoom    As the provider I attest to compliance with applicable laws and regulations related to telemedicine.         NUMBER OF PARTICIPANTS: 6    Summary of Group / Topics Discussed:  Cognitive Restructuring: Distortions part 2: Patients received an overview of how to identify common cognitive distortions. Patients will explore alternatives to cognitive distortions and practice challenging their negative thought patterns. The goal is to help patients target modify ineffective thought patterns.     Patient Session Goals / Objectives:    Familiarized self with ineffective / unhealthy thoughts and how they develop.      Explored impact of ineffective thoughts / distortions on mood and activity    Formulated new neutral/positive alternatives to challenge less helpful / ineffective  thoughts.    Practiced and plan to apply in daily life               Patient Participation / Response:  Fully participated with the group by sharing personal reflections / insights and openly received / provided feedback with other participants.    Demonstrated understanding of topics discussed through group discussion and participation, Expressed understanding of the relationship between behaviors, thoughts, and feelings, Identified / Expressed personal readiness to practice CBT and Verbalized understanding of patient's own thought patterns and how they impact their mood and behaviors    Treatment Plan:  Patient has a current master individualized treatment plan.  See Epic treatment plan for more information.    Tayla Carey, LICSW

## 2023-04-27 NOTE — GROUP NOTE
Process Group Note    PATIENT'S NAME: Everette Mayen  MRN:   [id number]  :   1975  ACCT. NUMBER: [id number]  DATE OF SERVICE: 23  START TIME:  9:00 AM  END TIME:  9:50 AM  FACILITATOR: Nirmala Jordan LMFT  TOPIC:  Process Group    Diagnoses:  296.89 Bipolar II Disorder Hypomanic  296.32 (F33.1) Major Depressive Disorder, Recurrent Episode, Moderate _ and With melancholic features.  300.02 (F41.1) Generalized Anxiety Disorder.      [facility] Mental Health Day Treatment  TRACK: 2A    NUMBER OF PARTICIPANTS: 6                                      Service Modality:  Video Visit     Telemedicine Visit: The patient's condition can be safely assessed and treated via synchronous audio and visual telemedicine encounter.      Reason for Telemedicine Visit: Patient has requested telehealth visit    Originating Site (Patient Location): Patient's home    Distant Site (Provider Location): Provider Remote Setting- Home Office    Consent:  The patient/guardian has verbally consented to: the potential risks and benefits of telemedicine (video visit) versus in person care; bill my insurance or make self-payment for services provided; and responsibility for payment of non-covered services.     Patient would like the video invitation sent by:  My Chart    Mode of Communication:  Video Conference via Medical Zoom    As the provider I attest to compliance with applicable laws and regulations related to telemedicine.                                  Data:    Session content: At the start of this group, patients were invited to check in by identifying themselves, describing their current emotional status, and identifying issues to address in this group.   Area(s) of treatment focus addressed in this session included Symptom Management, Personal Safety, Develop / Improve Independent Living Skills and Develop Socialization / Interpersonal Relationship Skills.    Patient reported feeling confident today  but stated it is a fleeting feeling. Patient stated she is unaware of what was making her feel that way. Patient stated she has been successful in getting things done and is currently coming out of a hypomanic episode. Patient also reported exercising which helps her mental health. 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 Patient shared her story with the group about why she joined group. Patient stated she had a difficult relationship and is attempting to get back on track. Patient reported getting a divorce in 2020. Patient stated she and her  live 2 minutes away so they could coparent. Patient reported her son passed away 2 years ago. Patient stated her son had many disabilities. Patient discussed her grief with the group. Patient stated during her bipolar episode she had a lot of shame about a relationship she was in. Patient discussed being a friend to this former partner and how he dropped off after he his life came  crashing in.  Patient stated she feels done with the partner but is not done with shame. Patient denied safety concerns or chemical use. Patient reported feeling proud of herself for being here in group today.     Therapeutic Interventions/Treatment Strategies:  Psychotherapist offered support, feedback and validation and reinforced use of skills. Treatment modalities used include Motivational Interviewing, Cognitive Behavioral Therapy and Dialectical Behavioral Therapy. Interventions include Emotions Management:  Discussed barriers to emotional regulation and Increased awareness of daily mood patterns/changes, Other: Assisted patient  in finding ways to adapt functioning in light of past traumatic experiences and Relationship Skills: Assisted patients in implementing more effective communication skills in their relationships and Discussed strategies to promote healthier understanding of interpersonal relationships.    Assessment:    Patient response:   Patient  responded to session by accepting feedback, giving feedback, listening, focusing on goals, being attentive and accepting support    Possible barriers to participation / learning include: and no barriers identified    Health Issues:   None reported       Substance Use Review:   Substance Use: No active concerns identified.    Mental Status/Behavioral Observations  Appearance:   Appropriate   Eye Contact:   Good   Psychomotor Behavior: Normal   Attitude:   Cooperative   Orientation:   All  Speech   Rate / Production: Normal    Volume:  Normal   Mood:    Depressed   Affect:    Appropriate   Thought Content:   Clear  Thought Form:  Coherent  Logical     Insight:    Good     Plan:     Safety Plan: No current safety concerns identified.  Recommended that patient call 911 or go to the local ED should there be a change in any of these risk factors.     Barriers to treatment: None identified    Patient Contracts (see media tab):  None    Substance Use: Not addressed in session     Continue or Discharge: Patient will continue in Adult Day Treatment (ADT)  as planned. Patient is likely to benefit from learning and using skills as they work toward the goals identified in their treatment plan.      MIREYA Perez  April 27, 2023

## 2023-04-27 NOTE — GROUP NOTE
Psychotherapy Group Note    PATIENT'S NAME: Everette Mayen  MRN:   [id number]  :   1975  ACCT. NUMBER: [id number]  DATE OF SERVICE: 23  START TIME: 10:00 AM  END TIME: 10:50 AM  FACILITATOR: Nirmala Jordan LMFT  TOPIC:  EBP Group: Specialty Awareness  [facility] Adult Mental Health Day Treatment  TRACK: 2A    NUMBER OF PARTICIPANTS: 5                                      Service Modality:  Video Visit     Telemedicine Visit: The patient's condition can be safely assessed and treated via synchronous audio and visual telemedicine encounter.      Reason for Telemedicine Visit: Patient has requested telehealth visit    Originating Site (Patient Location): Patient's home    Distant Site (Provider Location): Provider Remote Setting- Home Office    Consent:  The patient/guardian has verbally consented to: the potential risks and benefits of telemedicine (video visit) versus in person care; bill my insurance or make self-payment for services provided; and responsibility for payment of non-covered services.     Patient would like the video invitation sent by:  My Chart    Mode of Communication:  Video Conference via Medical Zoom    As the provider I attest to compliance with applicable laws and regulations related to telemedicine.          Summary of Group / Topics Discussed:  Specialty Topics: Grief/Transitions: Patients received an overview of the grief process.  Patients explored their relationship to loss and how that has affected their mental health symptoms.  Strategies for recognizing loss and ideas for engaging in the grief process was presented and discussed.  Patients identified needs for support and coping skills to manage loss.  The purpose of this specialty topic is to help patients identify the aspects of change due to loss that individuals experience in addition to mental health symptoms to better cope with the grief, loss, and life transitions.      Patient Session Goals /  Objectives:    Identified grief, loss, and life transitions     Discussed how grief impacts mental health symptoms and disrupts usual functioning    Identified needs for support and coping with grief and planned further action for coping      Patient Participation / Response:  Fully participated with the group by sharing personal reflections / insights and openly received / provided feedback with other participants.    Demonstrated understanding of topics discussed through group discussion and participation and Identified / Expressed readiness to act on skill suggestions discussed in topic    Treatment Plan:  Patient has a current master individualized treatment plan.  See Epic treatment plan for more information.    MIREYA Perez

## 2023-04-28 LAB
C TRACH DNA SPEC QL NAA+PROBE: NEGATIVE
N GONORRHOEA DNA SPEC QL NAA+PROBE: NEGATIVE

## 2023-04-28 NOTE — PROGRESS NOTES
SUBJECTIVE:   Everette Mayen is a 47 year old female presenting with a chief complaint of   Chief Complaint   Patient presents with     Urgent Care     UTI symptoms, new partner       She is an established patient of [facility].  Patient presents with complaints of dysuria for a few hours.  New partner and has concerns  Of STD.          Review of Systems    Past Medical History:   Diagnosis Date     Allergic rhinitis, cause unspecified      Depressive disorder, not elsewhere classified      Hypertension      Other psoriasis      Unspecified complication of pregnancy, unspecified as to episode of care 3/09    Willis's/2nd trimester termination     Family History   Problem Relation Age of Onset     Hypertension Mother      Allergies Mother      Arthritis Mother         psoriasis     Depression Mother      Genitourinary Problems Mother         urinary incontinence     Gynecology Mother         hyst age 48     Substance Abuse Father      Alcohol/Drug Father      Depression Father      Diabetes Maternal Grandmother      Eye Disorder Maternal Grandmother      Cerebrovascular Disease Maternal Grandfather      Cardiovascular Paternal Grandfather      Genetic Disorder Son         No further genetic testing planned, microcephaly     Seizure Disorder Son          age 11     Other Cancer Other      Melanoma No family hx of      Skin Cancer No family hx of      Coronary Artery Disease No family hx of      Hyperlipidemia No family hx of      Breast Cancer No family hx of      Colon Cancer No family hx of      Prostate Cancer No family hx of      Anxiety Disorder No family hx of      Anesthesia Reaction No family hx of      Asthma No family hx of      Osteoporosis No family hx of      Thyroid Disease No family hx of      Current Outpatient Medications   Medication Sig Dispense Refill     fluconazole (DIFLUCAN) 150 MG tablet Take 1 tablet (150 mg) by mouth every 3 days for 3 doses 3 tablet 0     nitroFURantoin  "macrocrystal-monohydrate (MACROBID) 100 MG capsule Take 1 capsule (100 mg) by mouth 2 times daily for 7 days 14 capsule 0     adalimumab (HUMIRA) 40 MG/0.8ML prefilled syringe kit Inject 0.8 mLs (40 mg) Subcutaneous every 14 days 2 each 5     amLODIPine-benazepril (LOTREL) 5-10 MG capsule Take 1 capsule by mouth daily 90 capsule 3     clobetasol (TEMOVATE) 0.05 % external ointment Apply topically 2 times daily as needed (Psoriasis) 60 g 11     lurasidone (LATUDA) 20 MG TABS tablet Take 1 tablet (20 mg) by mouth daily with food 30 tablet 3     traZODone (DESYREL) 50 MG tablet Take 1 tablet (50 mg) by mouth At Bedtime 30 tablet 3     Social History     Tobacco Use     Smoking status: Never     Smokeless tobacco: Never   Vaping Use     Vaping status: Never Used   Substance Use Topics     Alcohol use: Yes     Alcohol/week: 0.0 standard drinks of alcohol     Comment: < 1 drink / week       OBJECTIVE  BP (!) 149/88   Pulse 70   Temp 97.3  F (36.3  C)   Resp 16   Ht 1.575 m (5' 2\")   Wt 64.9 kg (143 lb)   LMP 12/20/2022 (Exact Date)   SpO2 97%   BMI 26.16 kg/m      Physical Exam  Vitals and nursing note reviewed.   Constitutional:       Appearance: Normal appearance. She is obese.   Eyes:      Extraocular Movements: Extraocular movements intact.      Conjunctiva/sclera: Conjunctivae normal.   Cardiovascular:      Rate and Rhythm: Normal rate and regular rhythm.      Pulses: Normal pulses.      Heart sounds: Normal heart sounds.   Pulmonary:      Effort: Pulmonary effort is normal.      Breath sounds: Normal breath sounds.   Abdominal:      Tenderness: There is no right CVA tenderness or left CVA tenderness.   Skin:     General: Skin is warm and dry.   Neurological:      General: No focal deficit present.      Mental Status: She is alert.   Psychiatric:         Mood and Affect: Mood normal.         Behavior: Behavior normal.         Labs:  Results for orders placed or performed in visit on 04/27/23 (from the past 24 " hour(s))   UA Macroscopic with reflex to Microscopic and Culture    Specimen: Urine, Clean Catch   Result Value Ref Range    Color Urine Yellow Colorless, Straw, Light Yellow, Yellow    Appearance Urine Clear Clear    Glucose Urine Negative Negative mg/dL    Bilirubin Urine Negative Negative    Ketones Urine Negative Negative mg/dL    Specific Gravity Urine 1.015 1.003 - 1.035    Blood Urine Moderate (A) Negative    pH Urine 6.0 5.0 - 7.0    Protein Albumin Urine Negative Negative mg/dL    Urobilinogen Urine 0.2 0.2, 1.0 E.U./dL    Nitrite Urine Negative Negative    Leukocyte Esterase Urine Moderate (A) Negative   Wet prep - Clinic Collect    Specimen: Vagina; Swab   Result Value Ref Range    Trichomonas Absent Absent    Yeast Present (A) Absent    Clue Cells Absent Absent    WBCs/high power field 3+ (A) None   Urine Microscopic Exam   Result Value Ref Range    Bacteria Urine Few (A) None Seen /HPF    RBC Urine 25-50 (A) 0-2 /HPF /HPF    WBC Urine  (A) 0-5 /HPF /HPF    Squamous Epithelials Urine Few (A) None Seen /LPF    WBC Clumps Urine Present (A) None Seen /HPF    Hyphal Yeast Urine Few (A) None Seen /HPF    Mucus Urine Present (A) None Seen /LPF       X-Ray was not done.    ASSESSMENT:      ICD-10-CM    1. Dysuria  R30.0 UA Macroscopic with reflex to Microscopic and Culture     Urine Microscopic Exam     Urine Culture      2. Vaginal discharge  N89.8 Wet prep - Clinic Collect     NEISSERIA GONORRHOEA PCR     CHLAMYDIA TRACHOMATIS PCR     NEISSERIA GONORRHOEA PCR     CHLAMYDIA TRACHOMATIS PCR      3. Candidiasis of vagina  B37.31 fluconazole (DIFLUCAN) 150 MG tablet      4. Acute cystitis with hematuria  N30.01 nitroFURantoin macrocrystal-monohydrate (MACROBID) 100 MG capsule           Medical Decision Making:    Differential Diagnosis:  UTI, STD    Serious Comorbid Conditions:  Adult:  reviewed    PLAN:    Rx for macrobid and diflucan.  Urine culture pending.  Increase fluid intake. Discussed reasons to  seek immediate medical attention - specifically, fevers or vomiting.  Finish all medications.          Followup:    If not improving or if condition worsens, follow up with your Primary Care Provider, If not improving or if conditions worsens over the next 12-24 hours, go to the Emergency Department    There are no Patient Instructions on file for this visit.

## 2023-04-29 LAB — BACTERIA UR CULT: NORMAL

## 2023-05-01 ENCOUNTER — HOSPITAL ENCOUNTER (OUTPATIENT)
Dept: BEHAVIORAL HEALTH | Facility: CLINIC | Age: 48
Discharge: HOME | End: 2023-05-01
Attending: PSYCHIATRY & NEUROLOGY
Payer: COMMERCIAL

## 2023-05-01 PROCEDURE — 90853 GROUP PSYCHOTHERAPY: CPT | Mod: 95

## 2023-05-01 NOTE — GROUP NOTE
Psychotherapy Group Note    PATIENT'S NAME: Everette Mayen  MRN:   [id number]  :   1975  ACCT. NUMBER: [id number]  DATE OF SERVICE: 23  START TIME: 10:00 AM  END TIME: 10:50 AM  FACILITATOR: Nirmala Jordan LMFT  TOPIC: MH EBP Group: Specialty Awareness  [facility] Mental Health Day Treatment  TRACK: 2A    NUMBER OF PARTICIPANTS: 8                                      Service Modality:  Video Visit     Telemedicine Visit: The patient's condition can be safely assessed and treated via synchronous audio and visual telemedicine encounter.      Reason for Telemedicine Visit: Patient has requested telehealth visit    Originating Site (Patient Location): Patient's home    Distant Site (Provider Location): Provider Remote Setting- Home Office    Consent:  The patient/guardian has verbally consented to: the potential risks and benefits of telemedicine (video visit) versus in person care; bill my insurance or make self-payment for services provided; and responsibility for payment of non-covered services.     Patient would like the video invitation sent by:  My Chart    Mode of Communication:  Video Conference via Medical Zoom    As the provider I attest to compliance with applicable laws and regulations related to telemedicine.               Summary of Group / Topics Discussed:  Specialty Topics: Trauma and PTSD: Patients were provided with information to understand the types and origins of trauma, the relationship between trauma and PTSD, the scope of Trauma-Informed Care, and treatment options. Patients were able to explore how trauma may have impacted their functioning directly or indirectly, with reference to the the complexity of trauma and associated treatment options. Patients reviewed their current awareness of this topic and relevance to their functioning. Individual experiences with symptoms and treatment options were discussed.     Patient Session Goals /  Objectives:    Discussed definitions of trauma, Trauma-Informed Care, PTSD    Discussed how traumatic experiences affect the individual and their relationships (directly, indirectly, brain development and function)    Identified how a history of trauma or exposure to trauma may impact group work    Assisted patients to find ways to adapt functioning in light of past traumatic experience(s), and to identify suitable treatment options and community resources      Patient Participation / Response:  Fully participated with the group by sharing personal reflections / insights and openly received / provided feedback with other participants.    Demonstrated understanding of topics discussed through group discussion and participation, Identified / Expressed readiness to act on skill suggestions discussed in topic and Verbalized understanding of ways to proactively manage illness    Treatment Plan:  Patient has a current master individualized treatment plan.  See Epic treatment plan for more information.    Nirmala Jordan LMFT

## 2023-05-01 NOTE — GROUP NOTE
Process Group Note    PATIENT'S NAME: Everette Mayen  MRN:   [id number]  :   1975  ACCT. NUMBER: [id number]  DATE OF SERVICE: 23  START TIME:  9:00 AM  END TIME:  9:50 AM  FACILITATOR: Nirmala Jordan LMFT  TOPIC:  Process Group    Diagnoses:  296.89 Bipolar II Disorder Hypomanic  296.32 (F33.1) Major Depressive Disorder, Recurrent Episode, Moderate _ and With melancholic features.  300.02 (F41.1) Generalized Anxiety Disorder.      [facility] Mental Health Day Treatment  TRACK: 2A    NUMBER OF PARTICIPANTS: 8                                      Service Modality:  Video Visit     Telemedicine Visit: The patient's condition can be safely assessed and treated via synchronous audio and visual telemedicine encounter.      Reason for Telemedicine Visit: Patient has requested telehealth visit    Originating Site (Patient Location): Patient's home    Distant Site (Provider Location): Provider Remote Setting- Home Office    Consent:  The patient/guardian has verbally consented to: the potential risks and benefits of telemedicine (video visit) versus in person care; bill my insurance or make self-payment for services provided; and responsibility for payment of non-covered services.     Patient would like the video invitation sent by:  My Chart    Mode of Communication:  Video Conference via Medical Zoom    As the provider I attest to compliance with applicable laws and regulations related to telemedicine.                                  Data:    Session content: At the start of this group, patients were invited to check in by identifying themselves, describing their current emotional status, and identifying issues to address in this group.   Area(s) of treatment focus addressed in this session included Symptom Management, Personal Safety, Develop / Improve Independent Living Skills and Develop Socialization / Interpersonal Relationship Skills.    Patient reported having a quick weekend.  Patient reported seeing her therapist on Friday afternoon and did EMDR that day. Patient stated she had been questioning starting to date again. Patient stated she was unsure if she was wanting to date for the right reasons. Patient also reported feeling hopeless about dating. Savannah shared her experience with EMDR with the group. Patient reported feeling relief from the therapy. Savannah denied safety concerns. Patient denied chemical use. Patient reported feeling grateful for getting through the weekend.     Therapeutic Interventions/Treatment Strategies:  Psychotherapist offered support, feedback and validation and reinforced use of skills. Treatment modalities used include Motivational Interviewing, Cognitive Behavioral Therapy and Dialectical Behavioral Therapy. Interventions include Mindfulness: Encouraged a plan to use mindfulness skills in daily life and Emotions Management:  Discussed barriers to emotional regulation and Increased awareness of daily mood patterns/changes.    Assessment:    Patient response:   Patient responded to session by accepting feedback, giving feedback, listening, focusing on goals, being attentive and accepting support    Possible barriers to participation / learning include: and no barriers identified    Health Issues:   None reported       Substance Use Review:   Substance Use: No active concerns identified.    Mental Status/Behavioral Observations  Appearance:   Appropriate   Eye Contact:   Good   Psychomotor Behavior: Normal   Attitude:   Cooperative   Orientation:   All  Speech   Rate / Production: Normal    Volume:  Normal   Mood:    Anxious   Affect:    Appropriate   Thought Content:   Clear  Thought Form:  Coherent  Logical     Insight:    Good     Plan:     Safety Plan: No current safety concerns identified.  Recommended that patient call 911 or go to the local ED should there be a change in any of these risk factors.     Barriers to treatment: None identified    Patient  Contracts (see media tab):  None    Substance Use: Not addressed in session     Continue or Discharge: Patient will continue in Adult Day Treatment (ADT)  as planned. Patient is likely to benefit from learning and using skills as they work toward the goals identified in their treatment plan.      Nirmala Jordan, LMFT  May 1, 2023

## 2023-05-02 ENCOUNTER — HOSPITAL ENCOUNTER (OUTPATIENT)
Dept: BEHAVIORAL HEALTH | Facility: CLINIC | Age: 48
Discharge: HOME | End: 2023-05-02
Attending: PSYCHIATRY & NEUROLOGY
Payer: COMMERCIAL

## 2023-05-02 PROCEDURE — 90853 GROUP PSYCHOTHERAPY: CPT | Mod: 95

## 2023-05-02 NOTE — GROUP NOTE
Psychoeducation Group Note    PATIENT'S NAME: Everette Mayen  MRN:   [id number]  :   1975  ACCT. NUMBER: [id number]  DATE OF SERVICE: 23  START TIME: 11:00 AM  END TIME: 11:50 AM  FACILITATOR: Tayla Moran LICSW  TOPIC: MH Life Skills Group: Lifestyle Balance and Structure  [facility] Adult Mental Health Day Treatment  TRACK: 2A                                    Service Modality:  Video Visit     Telemedicine Visit: The patient's condition can be safely assessed and treated via synchronous audio and visual telemedicine encounter.      Reason for Telemedicine Visit: Services only offered telehealth    Originating Site (Patient Location): Patient's home    Distant Site (Provider Location): [facility] Outpatient Setting: [facility]     Consent:  The patient/guardian has verbally consented to: the potential risks and benefits of telemedicine (video visit) versus in person care; bill my insurance or make self-payment for services provided; and responsibility for payment of non-covered services.     Patient would like the video invitation sent by:  My Chart    Mode of Communication:  Video Conference via Medical Zoom    As the provider I attest to compliance with applicable laws and regulations related to telemedicine.         NUMBER OF PARTICIPANTS: 7    Summary of Group / Topics Discussed:  Lifestyle Balance and Strucure:  Benefits of Leisure on Mental Health: Patients explored and learned about the benefits and possibilities of leisure activity to create lifestyle balance that supports their mental and physical wellbeing.  Patients were assisted to identify individualized leisure values and interests, recognized the benefits of leisure activity on mental health, and problem solved barriers to leisure engagement and strategies to overcome.  Patient engaged in an experiential leisure activity to gain self-awareness and build milieu social aspects and reflected on the impact the  Subjective    Chief Complaint   Patient presents with   • Pre-Op Exam     surg 4/14- right cataract, 4/28- left cataract, Dr Mcrae, [facility]       HPI: Mark Rivera is a 75 year old male who presents for a preop physical prior to his bilateral cataract surgeries (R: 4/14; L: 4/28). Pt has a past medical history of aortic valve replacement (2015), CHF - NYHA class I and Afib which are managed through cardiology. Pt reports taking no medications.     Pt has been complaining of scant sputum production and runny nose for 2-3 months. Pt expectorates sputum 1-2 times a day, primarily in the AM. Color is whitish-yellow. Pt denies any SOB, cough, wheezing. Pt does report a nose whistle on occasion when he lies on his L side. Pt is very active, he does heavy yard work including running a chainsaw and hauling logs without any dyspnea or chest pain. Pt does have multiple wood burning fireplaces in his home for which he cleans regularly. He wonders if some soot inhalation could explain his symptoms.     PROS:   Review of Systems   Constitutional: Negative for chills, diaphoresis, fever and malaise/fatigue.   HENT: Negative for congestion, sinus pain and sore throat.    Eyes: Positive for blurred vision.   Respiratory: Positive for sputum production. Negative for cough, shortness of breath and wheezing.    Cardiovascular: Negative for chest pain, palpitations, orthopnea and leg swelling.   Gastrointestinal: Negative for abdominal pain, constipation, diarrhea, nausea and vomiting.   Genitourinary: Negative for dysuria, hematuria and urgency.   Musculoskeletal: Negative for joint pain.   Skin: Negative for rash.   Neurological: Negative for dizziness, tingling, weakness and headaches.   Endo/Heme/Allergies: Negative for polydipsia.        Past Medical History:  Mild Aorctic stenosis is atrial fibrillation not on any current medications states was told he did not need a anticoagulant and he sees Cardiology  experiential activity had on their mood.       Patient Session Goals / Objectives:    Increased awareness of the importance of engagement in leisure activities to support lifestyle balance and perceived quality of life    Identified strategies to recognize and challenge barriers to leisure participation     Facilitated exploration of meaningful leisure interests and values    Practiced and reflected on how to generalize taught skills to their everyday life      Patient Participation / Response:  Fully participated with the group by sharing personal reflections / insights and openly received / provided feedback with other participants.    Verbalized understanding of content    Treatment Plan:  Patient has a current master individualized treatment plan.  See Epic treatment plan for more information.    Tayla Carey, LICSW       up-to-date in care as of February  Chronic right knee pain and degenerative joint disease affecting this joint  Gout  Eczema  Macular degeneration  History of prostate cancer      Past Surgical History:  Past Surgical History:   Procedure Laterality Date   • Aortic valve replacement     • Appendectomy      at age 33   • Cdl cath poss ptca poss stent  2015   • Colonoscopy  2017    radiation proctitis    Dr Mccann   • Colonoscopy diagnostic  2012   • Colonoscopy w/ polypectomy  2009   • Joint replacement Bilateral     knee   • Prostate gold seed implant  2010   • Tonsillectomy and adenoidectomy         Allergies:  ALLERGIES:  No Known Allergies    Medications:  Current Outpatient Medications   Medication Sig Dispense Refill   • vitamin D, Cholecalciferol, 25 mcg (1,000 units) capsule Take 1 capsule by mouth daily. 60 capsule    • amoxicillin (AMOXIL) 500 MG capsule Take 4 capsules by mouth once as needed (Take 1 hour prior to dental procedure). 4 capsule 1   • triamcinolone (ARISTOCORT) 0.1 % cream Tid prn rash 15 g 5   • allopurinol (ZYLOPRIM) 300 MG tablet TAKE 1 TABLET BY MOUTH  DAILY (Patient taking differently: Take 300 mg by mouth daily. Is taking one tablet every other day) 90 tablet 0     No current facility-administered medications for this visit.       Social History:  Social History     Substance and Sexual Activity   Alcohol Use Yes   • Alcohol/week: 24.0 standard drinks   • Types: 24 Cans of beer per week    Comment: 3-4 cans beers/day      Social History     Tobacco Use   Smoking Status Former Smoker   • Packs/day: 1.00   • Years: 4.00   • Pack years: 4.00   • Types: Cigarettes   • Quit date: 1969   • Years since quittin.2   Smokeless Tobacco Never Used      Social History     Substance and Sexual Activity   Drug Use No        Family History:  Family History     Relation Problem Comments    Brother Patient is unaware of any medical problems        Daughter Patient is  unaware of any medical problems        Daughter Patient is unaware of any medical problems        Father Cancer brain       Maternal Grandfather Patient is unaware of any medical problems        Maternal Grandmother Patient is unaware of any medical problems        Mother Arthritis RA       Paternal Grandfather Patient is unaware of any medical problems        Paternal Grandmother Patient is unaware of any medical problems        Sister Patient is unaware of any medical problems        Sister Patient is unaware of any medical problems        Son Patient is unaware of any medical problems        Son Patient is unaware of any medical problems                Objective    General: Patient is alert and comfortable in the room in no acute distress.    Vital Signs:  /60 (BP Location: LUE - Left upper extremity, Patient Position: Sitting, Cuff Size: Large Adult)   Pulse 87   Temp 97.1 °F (36.2 °C) (Temporal)   Ht 6' 3\" (1.905 m)   Wt 103.8 kg   BMI 28.60 kg/m²   BSA 2.32 m²     Physical Exam:   Physical Exam   Constitutional: He is oriented to person, place, and time and well-developed, well-nourished, and in no distress.   HENT:   Head: Normocephalic and atraumatic.   Right Ear: Tympanic membrane and ear canal normal.   Left Ear: Tympanic membrane and ear canal normal.   Eyes: Pupils are equal, round, and reactive to light. Conjunctivae are normal.   Cardiovascular: Normal rate, regular rhythm and intact distal pulses.   Murmur (aortic flow murmur likely 2/2 valve replacement) heard.  Pulmonary/Chest: Effort normal and breath sounds normal. No respiratory distress. He has no wheezes. He has no rales.   Abdominal: Soft. Bowel sounds are normal. He exhibits no distension and no mass. There is no abdominal tenderness. There is no guarding.   Musculoskeletal:         General: Normal range of motion.      Cervical back: Normal range of motion and neck supple.   Lymphadenopathy:     He has no cervical adenopathy.    Neurological: He is alert and oriented to person, place, and time.   Skin: Skin is warm and dry. No rash noted. No erythema.   Nursing note and vitals reviewed.         Assessment and Plan  1. Preop examination  - No acute cardiopulmonary concerns at this time. Pt is cleared for surgery and pre-op assessment will be sent to his surgeon.   - CBC, CMP, and CXR to be obtained today.   - With consideration of rhinorrhea, sputum production, and sneezing, there may be an allergic component to his symptoms. Pt may try OTC Claritin if he desires.     2. Elevated blood sugar  - Hx/o elevated BG, most recent C 2018. Will update today.     3. Congestive heart failure, NYHA class 1, unspecified congestive heart failure type (CMS/HCC)  - Stable; Pt is followed by cardiology      Thang Quijano, S-III    Teaching Attestation:  I have personally interviewed and examined the patient via face-to-face. I confirmed the findings listed below:    • Preop examination  (primary encounter diagnosis)  • Elevated blood sugar  • Congestive heart failure, NYHA class 1, unspecified congestive heart failure type (CMS/HCC)     This was discussed with the student and I agree with the assessment and plan as documented. The plan of care was discussed with the patient.

## 2023-05-02 NOTE — GROUP NOTE
Process Group Note    PATIENT'S NAME: Everette Mayen  MRN:   [id number]  :   1975  ACCT. NUMBER: [id number]  DATE OF SERVICE: 23  START TIME:  9:00 AM  END TIME:  9:50 AM  FACILITATOR: Nirmala Jordan LMFT  TOPIC:  Process Group    Diagnoses:  296.89 Bipolar II Disorder Hypomanic  296.32 (F33.1) Major Depressive Disorder, Recurrent Episode, Moderate _ and With melancholic features.  300.02 (F41.1) Generalized Anxiety Disorder.      [facility] Mental Health Day Treatment  TRACK: 2A    NUMBER OF PARTICIPANTS: 8                                      Service Modality:  Video Visit     Telemedicine Visit: The patient's condition can be safely assessed and treated via synchronous audio and visual telemedicine encounter.      Reason for Telemedicine Visit: Patient has requested telehealth visit    Originating Site (Patient Location): Patient's home    Distant Site (Provider Location): Provider Remote Setting- Home Office    Consent:  The patient/guardian has verbally consented to: the potential risks and benefits of telemedicine (video visit) versus in person care; bill my insurance or make self-payment for services provided; and responsibility for payment of non-covered services.     Patient would like the video invitation sent by:  My Chart    Mode of Communication:  Video Conference via Medical Zoom    As the provider I attest to compliance with applicable laws and regulations related to telemedicine.                                  Data:    Session content: At the start of this group, patients were invited to check in by identifying themselves, describing their current emotional status, and identifying issues to address in this group.   Area(s) of treatment focus addressed in this session included Symptom Management, Personal Safety, Develop / Improve Independent Living Skills and Develop Socialization / Interpersonal Relationship Skills.    Patient reported feeling mixed emotions  today. Patient stated while coming home yesterday she was notified she did not get a big project awarded that she was hopeful for. Patient reported feeling disappointed and stressed about not getting the project. Patient stated it seemed like a project would be easy to get and so began comparing self to others. Patient stated this has reinforced some negative core beliefs. Patient stated barriers are feeling tired of having to pick herself up. 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 Patient stated feeling the stress of keeping their salary going and is sad about loosing her team. Patient reported this could be an opportunity to do something different and find more of her passions in job. Patient reported winning a championship tennis match yesterday which was good. Patient discussed insights about being able to shift her brain when feeling better. Patient denied safety concerns or chemical use.          Therapeutic Interventions/Treatment Strategies:  Psychotherapist offered support, feedback and validation and reinforced use of skills. Treatment modalities used include Motivational Interviewing, Cognitive Behavioral Therapy and Dialectical Behavioral Therapy.    Assessment:    Patient response:   Patient responded to session by accepting feedback, giving feedback, listening, focusing on goals, being attentive and accepting support    Possible barriers to participation / learning include: and no barriers identified    Health Issues:   None reported       Substance Use Review:   Substance Use: No active concerns identified.    Mental Status/Behavioral Observations  Appearance:   Appropriate   Eye Contact:   Good   Psychomotor Behavior: Normal   Attitude:   Cooperative   Orientation:   All  Speech   Rate / Production: Normal    Volume:  Normal   Mood:    Depressed   Affect:    Appropriate   Thought Content:   Clear  Thought Form:  Coherent  Logical      Insight:    Good     Plan:     Safety Plan: No current safety concerns identified.  Recommended that patient call 911 or go to the local ED should there be a change in any of these risk factors.     Barriers to treatment: None identified    Patient Contracts (see media tab):  None    Substance Use: Not addressed in session     Continue or Discharge: Patient will continue in Adult Day Treatment (ADT)  as planned. Patient is likely to benefit from learning and using skills as they work toward the goals identified in their treatment plan.      Nirmala Jordan, DANYFT  May 2, 2023

## 2023-05-02 NOTE — GROUP NOTE
Psychotherapy Group Note    PATIENT'S NAME: Everette Mayen  MRN:   [id number]  :   1975  ACCT. NUMBER: [id number]  DATE OF SERVICE: 23  START TIME: 10:00 AM  END TIME: 10:50 AM  FACILITATOR: Nirmala Jordan LMFT  TOPIC: MH EBP Group: Specialty Awareness  [facility] Mental Health Day Treatment  TRACK: 2A    NUMBER OF PARTICIPANTS: 8                                      Service Modality:  Video Visit     Telemedicine Visit: The patient's condition can be safely assessed and treated via synchronous audio and visual telemedicine encounter.      Reason for Telemedicine Visit: Patient has requested telehealth visit    Originating Site (Patient Location): Patient's home    Distant Site (Provider Location): Provider Remote Setting- Home Office    Consent:  The patient/guardian has verbally consented to: the potential risks and benefits of telemedicine (video visit) versus in person care; bill my insurance or make self-payment for services provided; and responsibility for payment of non-covered services.     Patient would like the video invitation sent by:  My Chart    Mode of Communication:  Video Conference via Medical Zoom    As the provider I attest to compliance with applicable laws and regulations related to telemedicine.                            Summary of Group / Topics Discussed:  Specialty Topics: Trauma and PTSD: Patients were provided with information to understand the types and origins of trauma, the relationship between trauma and PTSD, the scope of Trauma-Informed Care, and treatment options. Patients were able to explore how trauma may have impacted their functioning directly or indirectly, with reference to the the complexity of trauma and associated treatment options. Patients reviewed their current awareness of this topic and relevance to their functioning. Individual experiences with symptoms and treatment options were discussed.     Patient Session Goals /  Objectives:    Discussed definitions of trauma, Trauma-Informed Care, PTSD    Discussed how traumatic experiences affect the individual and their relationships (directly, indirectly, brain development and function)    Identified how a history of trauma or exposure to trauma may impact group work    Assisted patients to find ways to adapt functioning in light of past traumatic experience(s), and to identify suitable treatment options and community resources      Patient Participation / Response:  Fully participated with the group by sharing personal reflections / insights and openly received / provided feedback with other participants.    Demonstrated understanding of topics discussed through group discussion and participation, Identified / Expressed readiness to act on skill suggestions discussed in topic and Verbalized understanding of ways to proactively manage illness    Treatment Plan:  Patient has a current master individualized treatment plan.  See Epic treatment plan for more information.    Nirmala Jordan LMFT

## 2023-05-04 ENCOUNTER — HOSPITAL ENCOUNTER (OUTPATIENT)
Dept: BEHAVIORAL HEALTH | Facility: CLINIC | Age: 48
Discharge: HOME | End: 2023-05-04
Attending: PSYCHIATRY & NEUROLOGY
Payer: COMMERCIAL

## 2023-05-04 PROCEDURE — 90853 GROUP PSYCHOTHERAPY: CPT | Mod: GT

## 2023-05-04 NOTE — GROUP NOTE
Process Group Note    PATIENT'S NAME: Everette Mayen  MRN:   [id number]  :   1975  ACCT. NUMBER: [id number]  DATE OF SERVICE: 23  START TIME:  9:00 AM  END TIME:  9:50 AM  FACILITATOR: Nirmala Jordan LMFT  TOPIC:  Process Group    Diagnoses:  296.89 Bipolar II Disorder Hypomanic  296.32 (F33.1) Major Depressive Disorder, Recurrent Episode, Moderate _ and With melancholic features.  300.02 (F41.1) Generalized Anxiety Disorder.      [facility] Mental Health Day Treatment  TRACK: 2A    NUMBER OF PARTICIPANTS: 6                                      Service Modality:  Video Visit     Telemedicine Visit: The patient's condition can be safely assessed and treated via synchronous audio and visual telemedicine encounter.      Reason for Telemedicine Visit: Patient has requested telehealth visit    Originating Site (Patient Location): Patient's home    Distant Site (Provider Location): Provider Remote Setting- Home Office    Consent:  The patient/guardian has verbally consented to: the potential risks and benefits of telemedicine (video visit) versus in person care; bill my insurance or make self-payment for services provided; and responsibility for payment of non-covered services.     Patient would like the video invitation sent by:  My Chart    Mode of Communication:  Video Conference via Medical Zoom    As the provider I attest to compliance with applicable laws and regulations related to telemedicine.                                  Data:    Session content: At the start of this group, patients were invited to check in by identifying themselves, describing their current emotional status, and identifying issues to address in this group.   Area(s) of treatment focus addressed in this session included Symptom Management, Personal Safety, Develop / Improve Independent Living Skills and Develop Socialization / Interpersonal Relationship Skills.    Patient reported feeling tired today and  stated she has been wondering if she should scale back on her medications. Patient received feedback from group. Patient reported feeling grateful she has been sleeping well this week. Patient stated her goal for today is to not make major decisions while feeling in a flight mode. Patient reported wanting to quit her job, move houses, and leave town. [place] discussed burn out in her work life and in relationships. Patient denied safety concerns or chemical use.     Therapeutic Interventions/Treatment Strategies:  Psychotherapist offered support, feedback and validation and reinforced use of skills. Treatment modalities used include Motivational Interviewing, Cognitive Behavioral Therapy and Dialectical Behavioral Therapy. Interventions include Cognitive Restructuring:  Assisted patient in formulating new neutral/positive alternatives to challenge less helpful / ineffective thoughts and Facilitated recognition of the connection between negative thoughts and negative core beliefs and Emotions Management:  Discussed barriers to emotional regulation and Increased awareness of daily mood patterns/changes.    Assessment:    Patient response:   Patient responded to session by accepting feedback, giving feedback, listening, focusing on goals, being attentive and accepting support    Possible barriers to participation / learning include: and no barriers identified    Health Issues:   None reported       Substance Use Review:   Substance Use: No active concerns identified.    Mental Status/Behavioral Observations  Appearance:   Appropriate   Eye Contact:   Good   Psychomotor Behavior: Normal   Attitude:   Cooperative   Orientation:   All  Speech   Rate / Production: Normal    Volume:  Normal   Mood:    Anxious   Affect:    Appropriate   Thought Content:   Rumination  Thought Form:  Coherent  Logical     Insight:    Good     Plan:     Safety Plan: No current safety concerns identified.  Recommended that patient call 911 or go  to the local ED should there be a change in any of these risk factors.     Barriers to treatment: None identified    Patient Contracts (see media tab):  None    Substance Use: Not addressed in session     Continue or Discharge: Patient will continue in Adult Day Treatment (ADT)  as planned. Patient is likely to benefit from learning and using skills as they work toward the goals identified in their treatment plan.      Nirmala Jordan, MIREYA  May 4, 2023

## 2023-05-04 NOTE — GROUP NOTE
Psychotherapy Group Note    PATIENT'S NAME: Eveertte Mayen  MRN:   [id number]  :   1975  ACCT. NUMBER: [id number]  DATE OF SERVICE: 23  START TIME: 10:00 AM  END TIME: 10:50 AM  FACILITATOR: Nirmala Jordan LMFT  TOPIC:  EBP Group: [facility] Adult Mental Health Day Treatment  TRACK: 2A    NUMBER OF PARTICIPANTS: 5                                      Service Modality:  Video Visit     Telemedicine Visit: The patient's condition can be safely assessed and treated via synchronous audio and visual telemedicine encounter.      Reason for Telemedicine Visit: Patient has requested telehealth visit    Originating Site (Patient Location): Patient's home    Distant Site (Provider Location): Provider Remote Setting- Home Office    Consent:  The patient/guardian has verbally consented to: the potential risks and benefits of telemedicine (video visit) versus in person care; bill my insurance or make self-payment for services provided; and responsibility for payment of non-covered services.     Patient would like the video invitation sent by:  My Chart    Mode of Communication:  Video Conference via Medical Zoom    As the provider I attest to compliance with applicable laws and regulations related to telemedicine.        Summary of Group / Topics Discussed:  Mindfulness: Mindfulness Experiential: Patients received an overview on what mindfulness is and how mindfulness can benefit general health, mental health symptoms, and stressors. The history of mindfulness, its application to mental health therapies, and key concepts were also discussed. Patients discussed current awareness, knowledge, and practice of mindfulness skills. Patients also discussed barriers to mindfulness practice.    Patient Session Goals / Objectives:    Demonstrated and verbalized understanding of key mindfulness concepts    Identified when/how to use mindfulness skills    Resolved barriers to practicing mindfulness  skills    Identified plan to use mindfulness skills in daily life       Patient Participation / Response:  Fully participated with the group by sharing personal reflections / insights and openly received / provided feedback with other participants.    Demonstrated understanding of topics discussed through group discussion and participation, Demonstrated understanding of mindfulness skills and benefits of practice and Practiced skills in session    Treatment Plan:  Patient has a current master individualized treatment plan.  See Epic treatment plan for more information.    Nirmala Jordan LMFT

## 2023-05-07 ENCOUNTER — OFFICE VISIT (OUTPATIENT)
Dept: URGENT CARE | Facility: URGENT CARE | Age: 48
End: 2023-05-07
Payer: COMMERCIAL

## 2023-05-07 ENCOUNTER — E-VISIT (OUTPATIENT)
Dept: URGENT CARE | Facility: CLINIC | Age: 48
End: 2023-05-07
Payer: COMMERCIAL

## 2023-05-07 VITALS
SYSTOLIC BLOOD PRESSURE: 126 MMHG | HEART RATE: 68 BPM | WEIGHT: 145.2 LBS | OXYGEN SATURATION: 97 % | TEMPERATURE: 98.7 F | DIASTOLIC BLOOD PRESSURE: 81 MMHG | RESPIRATION RATE: 18 BRPM | BODY MASS INDEX: 26.56 KG/M2

## 2023-05-07 DIAGNOSIS — R30.0 DYSURIA: ICD-10-CM

## 2023-05-07 DIAGNOSIS — N39.0 URINARY TRACT INFECTION WITH HEMATURIA, SITE UNSPECIFIED: Primary | ICD-10-CM

## 2023-05-07 DIAGNOSIS — R31.9 URINARY TRACT INFECTION WITH HEMATURIA, SITE UNSPECIFIED: Primary | ICD-10-CM

## 2023-05-07 DIAGNOSIS — R39.15 URINARY URGENCY: ICD-10-CM

## 2023-05-07 DIAGNOSIS — R39.9 URINARY SYMPTOM OR SIGN: Primary | ICD-10-CM

## 2023-05-07 LAB
ALBUMIN UR-MCNC: NEGATIVE MG/DL
APPEARANCE UR: CLEAR
BACTERIA #/AREA URNS HPF: ABNORMAL /HPF
BILIRUB UR QL STRIP: NEGATIVE
COLOR UR AUTO: YELLOW
GLUCOSE UR STRIP-MCNC: NEGATIVE MG/DL
HGB UR QL STRIP: ABNORMAL
KETONES UR STRIP-MCNC: NEGATIVE MG/DL
LEUKOCYTE ESTERASE UR QL STRIP: ABNORMAL
MUCOUS THREADS #/AREA URNS LPF: PRESENT /LPF
NITRATE UR QL: NEGATIVE
PH UR STRIP: 6 [PH] (ref 5–7)
RBC #/AREA URNS AUTO: ABNORMAL /HPF
SP GR UR STRIP: 1.02 (ref 1–1.03)
TRANS CELLS #/AREA URNS HPF: ABNORMAL /HPF
UROBILINOGEN UR STRIP-ACNC: 0.2 E.U./DL
WBC #/AREA URNS AUTO: ABNORMAL /HPF

## 2023-05-07 PROCEDURE — 99213 OFFICE O/P EST LOW 20 MIN: CPT | Performed by: FAMILY MEDICINE

## 2023-05-07 PROCEDURE — 99207 PR NON-BILLABLE SERV PER CHARTING: CPT | Performed by: PHYSICIAN ASSISTANT

## 2023-05-07 PROCEDURE — 81001 URINALYSIS AUTO W/SCOPE: CPT | Performed by: FAMILY MEDICINE

## 2023-05-07 PROCEDURE — 87086 URINE CULTURE/COLONY COUNT: CPT | Performed by: FAMILY MEDICINE

## 2023-05-07 RX ORDER — CEFDINIR 300 MG/1
300 CAPSULE ORAL 2 TIMES DAILY
Qty: 14 CAPSULE | Refills: 0 | Status: SHIPPED | OUTPATIENT
Start: 2023-05-07 | End: 2023-05-14

## 2023-05-07 NOTE — PATIENT INSTRUCTIONS
Dear Everette Mayen,    We are sorry you are not feeling well. Based on the responses you provided, it is recommended that you be seen in-person in urgent care so we can better evaluate your symptoms. The urine culture did not specify that a particular antibiotic would help.  Using macrobid for 7 days is longer than we recommend for a urinary tract infection normally.  Please click here to find the nearest urgent care location to you. You will not be charged for this Visit. I would also recommend seeing urology for a long term management plan with how many urinary tract infections you have had recently.  Thank you for trusting us with your care.    Bernadette Parish PA-C, PAAlysonC

## 2023-05-07 NOTE — PROGRESS NOTES
SUBJECTIVE: Everette Mayen is a 47 year old female who  presents today for a possible UTI.   Symptoms of dysuria and frequency have been going on for 1day(s).        Past Medical History:   Diagnosis Date     Allergic rhinitis, cause unspecified      Depressive disorder, not elsewhere classified      Hypertension 2008     Other psoriasis      Unspecified complication of pregnancy, unspecified as to episode of care 3/09    Willis's/2nd trimester termination     Allergies   Allergen Reactions     Sulfa Antibiotics Hives     Lupus Erythematous     Sulfacetamide Hives and Itching     Lupus Erythematous     Lamotrigine Rash     Lamotrigine Rash     Social History     Tobacco Use     Smoking status: Never     Smokeless tobacco: Never   Vaping Use     Vaping status: Never Used   Substance Use Topics     Alcohol use: Yes     Alcohol/week: 0.0 standard drinks of alcohol     Comment: < 1 drink / week       ROS: CONSTITUTIONAL:NEGATIVE for fever, chills, change in weight    OBJECTIVE:  /81 (BP Location: Left arm, Patient Position: Sitting, Cuff Size: Adult Regular)   Pulse 68   Temp 98.7  F (37.1  C) (Oral)   Resp 18   Wt 65.9 kg (145 lb 3.2 oz)   LMP 12/20/2022 (Exact Date)   SpO2 97%   BMI 26.56 kg/m      No Flank/abd pain      ICD-10-CM    1. Urinary tract infection with hematuria, site unspecified  N39.0 cefdinir (OMNICEF) 300 MG capsule    R31.9       2. Dysuria  R30.0 UA with Microscopic reflex to Culture - Clinic Collect     UA Microscopic with Reflex to Culture     Urine Culture      3. Urinary urgency  R39.15           Drink plenty of fluids.  Prevention and treatment of UTI's discussed.Signs and symptoms of pyelonephritis mentioned.  Follow up with primary care physician if not improving

## 2023-05-08 ENCOUNTER — HOSPITAL ENCOUNTER (OUTPATIENT)
Dept: BEHAVIORAL HEALTH | Facility: CLINIC | Age: 48
Discharge: HOME | End: 2023-05-08
Attending: PSYCHIATRY & NEUROLOGY
Payer: COMMERCIAL

## 2023-05-08 PROCEDURE — 90853 GROUP PSYCHOTHERAPY: CPT | Mod: 95

## 2023-05-08 PROCEDURE — 90853 GROUP PSYCHOTHERAPY: CPT | Mod: 95 | Performed by: MARRIAGE & FAMILY THERAPIST

## 2023-05-08 PROCEDURE — 90853 GROUP PSYCHOTHERAPY: CPT | Mod: GT

## 2023-05-08 NOTE — GROUP NOTE
Psychotherapy Group Note    PATIENT'S NAME: Everette Mayen  MRN:   [id number]  :   1975  ACCT. NUMBER: [id number]  DATE OF SERVICE: 23  START TIME: 11:00 AM  END TIME: 11:50 AM  FACILITATOR: Tayla Moran LICSW  TOPIC: MH EBP Group: Coping Skills  [facility] Adult Mental Health Day Treatment  TRACK: 2A                                    Service Modality:  Video Visit     Telemedicine Visit: The patient's condition can be safely assessed and treated via synchronous audio and visual telemedicine encounter.      Reason for Telemedicine Visit: Services only offered telehealth    Originating Site (Patient Location): Patient's home    Distant Site (Provider Location): [facility] Outpatient Setting: [facility]     Consent:  The patient/guardian has verbally consented to: the potential risks and benefits of telemedicine (video visit) versus in person care; bill my insurance or make self-payment for services provided; and responsibility for payment of non-covered services.     Patient would like the video invitation sent by:  My Chart    Mode of Communication:  Video Conference via Medical Zoom    As the provider I attest to compliance with applicable laws and regulations related to telemedicine.         NUMBER OF PARTICIPANTS: 5    Summary of Group / Topics Discussed:  Coping Skills: Self-Soothe: Patients learned to apply self-soothe as a way to decrease heightened stress in the moment.  Patients identified situations that necessitate self-soothe strategies.  They focused on ways to manage physical symptoms of distress using the senses. They discussed how to distinguish when this can be useful in their lives when other strategies are more relevant or helpful.    Patient Session Goals / Objectives:    Understand the purpose of using the senses to decrease distress    Process what happens in the body when using self-soothe strategies    Demonstrate understanding of when to use self-soothe  strategies    Identify and problem solve barriers to applying self-soothe strategies.    Choose 1-2 self-soothe strategies to apply during times of distress.        Patient Participation / Response:  Fully participated with the group by sharing personal reflections / insights and openly received / provided feedback with other participants.    Demonstrated understanding of topics discussed through group discussion and participation and Expressed understanding of the relevance / importance of coping skills at distressing times in life    Treatment Plan:  Patient has a current master individualized treatment plan.  See Epic treatment plan for more information.    Tayla Carey, LICSW

## 2023-05-08 NOTE — GROUP NOTE
Psychotherapy Group Note    PATIENT'S NAME: Everette Mayen  MRN:   [id number]  :   1975  ACCT. NUMBER: [id number]  DATE OF SERVICE: 23  START TIME: 10:00 AM  END TIME: 10:50 AM  FACILITATOR: Sridhar Johnston LMFT; Nirmala Jordan LMFT  TOPIC: MH EBP Group: Relationship Skills  [facility] Mental Health Day Treatment  TRACK: 2A    NUMBER OF PARTICIPANTS: 6    Summary of Group / Topics Discussed:  Relationship Skills: Basic Communication: Patients were provided with a general overview of interpersonal communication skills and information about how communication skills impact symptoms and functioning. The goal of this topic is to help patients identify communication issues and gain skills to work towards healthier interpersonal communication. Patients reviewed their current awareness of communication issues and how communication skills impact relationships and functioning.      Patient Session Goals / Objectives:    Identified and discussed patients individual challenges with basic communication    Presented and practiced effective communication skills in session    Assisted patients in implementing more effective communication skills in their relationships    Telemedicine Visit: The patient's condition can be safely assessed and treated via synchronous audio and visual telemedicine encounter.      Reason for Telemedicine Visit: Patient has requested telehealth visit    Originating Site (Patient Location): Patient's home      Distant Location (provider location):  On-site    Consent:  The patient/guardian has verbally consented to: the potential risks and benefits of telemedicine (video visit) versus in person care; bill my insurance or make self-payment for services provided; and responsibility for payment of non-covered services.     Mode of Communication:  Video Conference via Groupjump    As the provider I attest to compliance with applicable laws and regulations related to  telemedicine.       Patient Participation / Response:  Fully participated with the group by sharing personal reflections / insights and openly received / provided feedback with other participants.    Demonstrated understanding of topics discussed through group discussion and participation, Demonstrated understanding of relationship skills and communication skills, Identified / Expressed personal readiness to incorporate effective communication skills and Verbalized understanding of communication skills, communication challenges, and communication strengths    Treatment Plan:  Patient has a current master individualized treatment plan.  See Epic treatment plan for more information.    Sridhar Johnston LMFT

## 2023-05-08 NOTE — GROUP NOTE
Process Group Note    PATIENT'S NAME: Everette Mayen  MRN:   [id number]  :   1975  ACCT. NUMBER: [id number]  DATE OF SERVICE: 23  START TIME:  9:00 AM  END TIME:  9:50 AM  FACILITATOR: Nirmala Jordan LMFT  TOPIC:  Process Group    Diagnoses:  296.89 Bipolar II Disorder Hypomanic  296.32 (F33.1) Major Depressive Disorder, Recurrent Episode, Moderate _ and With melancholic features.  300.02 (F41.1) Generalized Anxiety Disorder.      [facility] Mental Health Day Treatment  TRACK: 2A    NUMBER OF PARTICIPANTS: 7                                      Service Modality:  Video Visit     Telemedicine Visit: The patient's condition can be safely assessed and treated via synchronous audio and visual telemedicine encounter.      Reason for Telemedicine Visit: Patient has requested telehealth visit    Originating Site (Patient Location): Patient's home    Distant Site (Provider Location): Provider Remote Setting- Home Office    Consent:  The patient/guardian has verbally consented to: the potential risks and benefits of telemedicine (video visit) versus in person care; bill my insurance or make self-payment for services provided; and responsibility for payment of non-covered services.     Patient would like the video invitation sent by:  My Chart    Mode of Communication:  Video Conference via Medical Zoom    As the provider I attest to compliance with applicable laws and regulations related to telemedicine.                                  Data:    Session content: At the start of this group, patients were invited to check in by identifying themselves, describing their current emotional status, and identifying issues to address in this group.   Area(s) of treatment focus addressed in this session included Symptom Management, Personal Safety, Develop / Improve Independent Living Skills and Develop Socialization / Interpersonal Relationship Skills.    Patient reported having a good weekend but  was quiet. Patient reported struggling internally with all the work she has been doing in IOP and individual therapy. Savannah struggled with self-talk around not being well and not being out and dating and moving forward in her life. Patient received challenges around the self talk. Patient stated she is not in crisis but is doing so much processing outside of group it has been impacting her work. Patient stated barriers are an artificial deadline of turning 48 to be in a relationship or dating again. Patient denied safety concerns or chemical use.     Therapeutic Interventions/Treatment Strategies:  Psychotherapist offered support, feedback and validation and reinforced use of skills. Treatment modalities used include Motivational Interviewing, Cognitive Behavioral Therapy and Dialectical Behavioral Therapy. Interventions include Emotions Management:  Reinforced the purpose and biological basis of emotions, Discussed barriers to emotional regulation and Increased awareness of daily mood patterns/changes and Relationship Skills: Assisted patients in implementing more effective communication skills in their relationships and Encouraged development and maintenance  of healthy boundaries.    Assessment:    Patient response:   Patient responded to session by accepting feedback, giving feedback, listening, focusing on goals, being attentive and accepting support    Possible barriers to participation / learning include: and no barriers identified    Health Issues:   None reported       Substance Use Review:   Substance Use: No active concerns identified.    Mental Status/Behavioral Observations  Appearance:   Appropriate   Eye Contact:   Good   Psychomotor Behavior: Normal   Attitude:   Cooperative   Orientation:   All  Speech   Rate / Production: Normal    Volume:  Normal   Mood:    Anxious   Affect:    Appropriate   Thought Content:   Clear  Thought Form:  Coherent  Logical     Insight:    Good     Plan:     Safety  Plan: No current safety concerns identified.  Recommended that patient call 911 or go to the local ED should there be a change in any of these risk factors.     Barriers to treatment: None identified    Patient Contracts (see media tab):  None    Substance Use: Not addressed in session     Continue or Discharge: Patient will continue in Adult Day Treatment (ADT)  as planned. Patient is likely to benefit from learning and using skills as they work toward the goals identified in their treatment plan.      Nirmala Jordan, DANYFT  May 8, 2023

## 2023-05-09 ENCOUNTER — HOSPITAL ENCOUNTER (OUTPATIENT)
Dept: BEHAVIORAL HEALTH | Facility: CLINIC | Age: 48
Discharge: HOME | End: 2023-05-09
Attending: PSYCHIATRY & NEUROLOGY
Payer: COMMERCIAL

## 2023-05-09 LAB — BACTERIA UR CULT: NORMAL

## 2023-05-09 PROCEDURE — 90853 GROUP PSYCHOTHERAPY: CPT | Mod: GT

## 2023-05-09 NOTE — GROUP NOTE
Psychotherapy Group Note    PATIENT'S NAME: Everette Mayen  MRN:   [id number]  :   1975  ACCT. NUMBER: [id number]  DATE OF SERVICE: 23  START TIME: 10:00 AM  END TIME: 10:50 AM  FACILITATOR: Nirmala Jordan LMFT  TOPIC: MH EBP Group: Specialty Awareness  [facility] Mental Health Day Treatment  TRACK: 2A    NUMBER OF PARTICIPANTS: 6                                      Service Modality:  Video Visit     Telemedicine Visit: The patient's condition can be safely assessed and treated via synchronous audio and visual telemedicine encounter.      Reason for Telemedicine Visit: Patient has requested telehealth visit    Originating Site (Patient Location): Patient's home    Distant Site (Provider Location): Provider Remote Setting- Home Office    Consent:  The patient/guardian has verbally consented to: the potential risks and benefits of telemedicine (video visit) versus in person care; bill my insurance or make self-payment for services provided; and responsibility for payment of non-covered services.     Patient would like the video invitation sent by:  My Chart    Mode of Communication:  Video Conference via Medical Zoom    As the provider I attest to compliance with applicable laws and regulations related to telemedicine.                            Summary of Group / Topics Discussed:  Specialty Topics: Trauma and PTSD: Patients were provided with information to understand the types and origins of trauma, the relationship between trauma and PTSD, the scope of Trauma-Informed Care, and treatment options. Patients were able to explore how trauma may have impacted their functioning directly or indirectly, with reference to the the complexity of trauma and associated treatment options. Patients reviewed their current awareness of this topic and relevance to their functioning. Individual experiences with symptoms and treatment options were discussed.     Patient Session Goals /  Objectives:    Discussed definitions of trauma, Trauma-Informed Care, PTSD    Discussed how traumatic experiences affect the individual and their relationships (directly, indirectly, brain development and function)    Identified how a history of trauma or exposure to trauma may impact group work    Assisted patients to find ways to adapt functioning in light of past traumatic experience(s), and to identify suitable treatment options and community resources      Patient Participation / Response:  Fully participated with the group by sharing personal reflections / insights and openly received / provided feedback with other participants.    Demonstrated understanding of topics discussed through group discussion and participation, Identified / Expressed readiness to act on skill suggestions discussed in topic and Verbalized understanding of ways to proactively manage illness    Treatment Plan:  Patient has a current master individualized treatment plan.  See Epic treatment plan for more information.    Nirmala Jordan LMFT

## 2023-05-09 NOTE — GROUP NOTE
Process Group Note    PATIENT'S NAME: Everette Mayen  MRN:   [id number]  :   1975  ACCT. NUMBER: [id number]  DATE OF SERVICE: 23  START TIME:  9:00 AM  END TIME:  9:50 AM  FACILITATOR: Nirmala Jordan LMFT  TOPIC:  Process Group    Diagnoses:  296.89 Bipolar II Disorder Hypomanic  296.32 (F33.1) Major Depressive Disorder, Recurrent Episode, Moderate _ and With melancholic features.  300.02 (F41.1) Generalized Anxiety Disorder.      [facility] Mental Health Day Treatment  TRACK: 2A    NUMBER OF PARTICIPANTS: 6                                      Service Modality:  Video Visit     Telemedicine Visit: The patient's condition can be safely assessed and treated via synchronous audio and visual telemedicine encounter.      Reason for Telemedicine Visit: Patient has requested telehealth visit    Originating Site (Patient Location): Patient's home    Distant Site (Provider Location): Provider Remote Setting- Home Office    Consent:  The patient/guardian has verbally consented to: the potential risks and benefits of telemedicine (video visit) versus in person care; bill my insurance or make self-payment for services provided; and responsibility for payment of non-covered services.     Patient would like the video invitation sent by:  My Chart    Mode of Communication:  Video Conference via Medical Zoom    As the provider I attest to compliance with applicable laws and regulations related to telemedicine.                                  Data:    Session content: At the start of this group, patients were invited to check in by identifying themselves, describing their current emotional status, and identifying issues to address in this group.   Area(s) of treatment focus addressed in this session included Symptom Management, Personal Safety, Develop / Improve Independent Living Skills and Develop Socialization / Interpersonal Relationship Skills.    Patient reported feeling angry today.  Patient stated after yesterday s session she processed feeling tired about working on her stuff and wanting to start her life outside of herself. Patient processed wanting to have fun within relationships despite not being in a good place right now. Patient stated last night her friends were talking about preparing for [employer] and planning other stages of their life. Patient stated she hears from her team she is not in a place to make major decisions. Patient stated she made plans for the weekend to celebrate mother s day and stated she was grateful for being a mother. Patient denied safety concerns or chemical use.     Therapeutic Interventions/Treatment Strategies:  Psychotherapist offered support, feedback and validation and reinforced use of skills. Treatment modalities used include Motivational Interviewing, Cognitive Behavioral Therapy and Dialectical Behavioral Therapy. Interventions include Symptoms Management: Promoted understanding of their diagnoses and how it impacts their functioning.    Assessment:    Patient response:   Patient responded to session by accepting feedback, giving feedback, listening, focusing on goals, being attentive and accepting support    Possible barriers to participation / learning include: and no barriers identified    Health Issues:   None reported       Substance Use Review:   Substance Use: No active concerns identified.    Mental Status/Behavioral Observations  Appearance:   Appropriate   Eye Contact:   Good   Psychomotor Behavior: Normal   Attitude:   Cooperative   Orientation:   All  Speech   Rate / Production: Normal    Volume:  Normal   Mood:    Anxious  Depressed   Affect:    Appropriate   Thought Content:   Clear  Thought Form:  Coherent  Logical     Insight:    Good     Plan:     Safety Plan: No current safety concerns identified.  Recommended that patient call 911 or go to the local ED should there be a change in any of these risk factors.     Barriers to  treatment: None identified    Patient Contracts (see media tab):  None    Substance Use: Not addressed in session     Continue or Discharge: Patient will continue in Adult Day Treatment (ADT)  as planned. Patient is likely to benefit from learning and using skills as they work toward the goals identified in their treatment plan.      Nirmala Jordan, DANYFT  May 9, 2023

## 2023-05-11 ENCOUNTER — HOSPITAL ENCOUNTER (OUTPATIENT)
Dept: BEHAVIORAL HEALTH | Facility: CLINIC | Age: 48
Discharge: HOME | End: 2023-05-11
Attending: PSYCHIATRY & NEUROLOGY
Payer: COMMERCIAL

## 2023-05-11 PROCEDURE — 90853 GROUP PSYCHOTHERAPY: CPT | Mod: GT

## 2023-05-11 NOTE — GROUP NOTE
Psychotherapy Group Note    PATIENT'S NAME: Everette Mayen  MRN:   [id number]  :   1975  ACCT. NUMBER: [id number]  DATE OF SERVICE: 23  START TIME: 11:00 AM  END TIME: 11:50 AM  FACILITATOR: Tayla Moran LICSW  TOPIC: MH EBP Group: Coping Skills  [facility] Adult Mental Health Day Treatment  TRACK: 2A                                    Service Modality:  Video Visit     Telemedicine Visit: The patient's condition can be safely assessed and treated via synchronous audio and visual telemedicine encounter.      Reason for Telemedicine Visit: Services only offered telehealth    Originating Site (Patient Location): Patient's home    Distant Site (Provider Location): [facility] Outpatient Setting: [facility]     Consent:  The patient/guardian has verbally consented to: the potential risks and benefits of telemedicine (video visit) versus in person care; bill my insurance or make self-payment for services provided; and responsibility for payment of non-covered services.     Patient would like the video invitation sent by:  My Chart    Mode of Communication:  Video Conference via Medical Zoom    As the provider I attest to compliance with applicable laws and regulations related to telemedicine.         NUMBER OF PARTICIPANTS: 3    Summary of Group / Topics Discussed:  Coping Skills: Additional Coping Skills:  GLADLY GO exercise. Patients discussed and practiced GLADLY GO, a way to review accomplishments and positives in your life.  Reviewed the benefits of applying the aforementioned coping strategies.  Patients explored how these strategies might be applied to daily stressors or distressing situations.    Patient Session Goals / Objectives:    Understand the purpose and benefits of applying GLADLY GO coping strategies    Identified positives and accomplishments from the past week    Address barriers to utilizing coping skills when in distress.        Patient Participation /  Response:  Fully participated with the group by sharing personal reflections / insights and openly received / provided feedback with other participants.    Demonstrated understanding of topics discussed through group discussion and participation, Expressed understanding of the relevance / importance of coping skills at distressing times in life and Identified / Expressed personal readiness to practice new coping skills    Treatment Plan:  Patient has a current master individualized treatment plan.  See Epic treatment plan for more information.    Tayla Carey, LICSW

## 2023-05-11 NOTE — GROUP NOTE
Psychotherapy Group Note    PATIENT'S NAME: Everette Mayen  MRN:   [id number]  :   1975  ACCT. NUMBER: [id number]  DATE OF SERVICE: 23  START TIME: 10:00 AM  END TIME: 10:50 AM  FACILITATOR: Nirmala Jordan LMFT  TOPIC: MH EBP Group: [facility] Adult Mental Health Day Treatment  TRACK: 2A    NUMBER OF PARTICIPANTS: 4                                      Service Modality:  Video Visit     Telemedicine Visit: The patient's condition can be safely assessed and treated via synchronous audio and visual telemedicine encounter.      Reason for Telemedicine Visit: Patient has requested telehealth visit    Originating Site (Patient Location): Patient's home    Distant Site (Provider Location): Provider Remote Setting- Home Office    Consent:  The patient/guardian has verbally consented to: the potential risks and benefits of telemedicine (video visit) versus in person care; bill my insurance or make self-payment for services provided; and responsibility for payment of non-covered services.     Patient would like the video invitation sent by:  My Chart    Mode of Communication:  Video Conference via Medical Zoom    As the provider I attest to compliance with applicable laws and regulations related to telemedicine.              Summary of Group / Topics Discussed:  Mindfulness: Mindfulness Experiential: Patients received an overview on what mindfulness is and how mindfulness can benefit general health, mental health symptoms, and stressors. The history of mindfulness, its application to mental health therapies, and key concepts were also discussed. Patients discussed current awareness, knowledge, and practice of mindfulness skills. Patients also discussed barriers to mindfulness practice.    Patient Session Goals / Objectives:    Demonstrated and verbalized understanding of key mindfulness concepts    Identified when/how to use mindfulness skills    Resolved barriers to practicing  mindfulness skills    Identified plan to use mindfulness skills in daily life       Patient Participation / Response:  Fully participated with the group by sharing personal reflections / insights and openly received / provided feedback with other participants.    Demonstrated understanding of topics discussed through group discussion and participation, Demonstrated understanding of mindfulness skills and benefits of practice and Practiced skills in session    Treatment Plan:  Patient has a current master individualized treatment plan.  See Epic treatment plan for more information.    Nirmala Jordan LMFT
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
## 2023-05-11 NOTE — TELEPHONE ENCOUNTER
----- Message from MIREYA Perez sent at 2023 12:16 PM CDT -----  RegardinA Transfer to [facility]  Scheduling Request    Patient Name: Everette Mayen  Location of programming: [facility] IOP  Start Date: May / 15 / 2023  Group:  [facility] on Monday, Tuesday, and Thursday at 9:00 AM to 12:00 PM  Attending Provider (MD): Dr. Winston  Number of visits to be scheduled: 18  Duration of Appointment in minutes: 180  Visit Type: In-person or Treatment - 870

## 2023-05-15 ENCOUNTER — HOSPITAL ENCOUNTER (OUTPATIENT)
Dept: BEHAVIORAL HEALTH | Facility: CLINIC | Age: 48
Discharge: HOME | End: 2023-05-15
Attending: PSYCHIATRY & NEUROLOGY
Payer: COMMERCIAL

## 2023-05-15 VITALS — DIASTOLIC BLOOD PRESSURE: 65 MMHG | HEART RATE: 69 BPM | OXYGEN SATURATION: 97 % | SYSTOLIC BLOOD PRESSURE: 113 MMHG

## 2023-05-15 DIAGNOSIS — F31.81 BIPOLAR 2 DISORDER (H): Primary | ICD-10-CM

## 2023-05-15 PROCEDURE — H2012 BEHAV HLTH DAY TREAT, PER HR: HCPCS | Performed by: MARRIAGE & FAMILY THERAPIST

## 2023-05-15 PROCEDURE — 99214 OFFICE O/P EST MOD 30 MIN: CPT | Performed by: PSYCHIATRY & NEUROLOGY

## 2023-05-15 PROCEDURE — 90853 GROUP PSYCHOTHERAPY: CPT | Performed by: PSYCHOLOGIST

## 2023-05-15 PROCEDURE — 90853 GROUP PSYCHOTHERAPY: CPT | Performed by: MARRIAGE & FAMILY THERAPIST

## 2023-05-15 NOTE — GROUP NOTE
Psychotherapy Group Note    PATIENT'S NAME: Everette Mayen  MRN:   [id number]  :   1975  ACCT. NUMBER: [id number]  DATE OF SERVICE: 5/15/23  START TIME: 10:00 AM  END TIME: 10:50 AM  FACILITATOR: Sridhar Johnston LMFT  TOPIC: MH EBP Group: Relationship Skills  [facility] Mental Health Day Treatment  TRACK: A-2    NUMBER OF PARTICIPANTS: 8    Summary of Group / Topics Discussed:  Relationship Skills: Relationship Mapping: Patients identified different types of relationships they have in their life and examined if there is conflict or closeness, the degree of conflict or closeness, and the reason for conflict. The goal of this topic is to help patients gain awareness of the relationships they have with others, identify types of conflict in patients  lives and how they impact symptoms/functioning, and identify how they can improve relationships with relationship and interpersonal skills they have learned.     Patient Session Goals / Objectives:    Familiarized patients with awareness to the different types of relationships they may have with different people and substances in their lives    Discussed and practiced strategies to promote healthier understanding of interpersonal relationships with a focus on awareness of conflict, the causes of conflict, and the ways to transform conflict    Discussed the use of substances and its impact on their relationships      Patient Participation / Response:  Fully participated with the group by sharing personal reflections / insights and openly received / provided feedback with other participants.    Demonstrated understanding of topics discussed through group discussion and participation, Demonstrated understanding of relationship skills and communication skills and Identified / Expressed personal readiness to incorporate effective communication skills    Treatment Plan:  Patient has a current master individualized treatment plan.  See Epic treatment  plan for more information.    Sridhar Johnston LMFT

## 2023-05-15 NOTE — GROUP NOTE
Process Group Note    PATIENT'S NAME: Everette Mayen  MRN:   [id number]  :   1975  ACCT. NUMBER: [id number]  DATE OF SERVICE: 5/15/23  START TIME:  9:00 AM  END TIME:  9:50 AM  FACILITATOR: Sridhar Johnston LMFT  TOPIC:  Process Group    Diagnoses:  296.89 Bipolar II Disorder Hypomanic  296.32 (F33.1) Major Depressive Disorder, Recurrent Episode, Moderate _ and With melancholic features.  300.02 (F41.1) Generalized Anxiety Disorder.    [facility] Adult Mental Health Day Treatment  TRACK: A-2    NUMBER OF PARTICIPANTS: 8          Data:    Session content: At the start of this group, patients were invited to check in by identifying themselves, describing their current emotional status, and identifying issues to address in this group.   Area(s) of treatment focus addressed in this session included Symptom Management, Personal Safety and Community Resources/Discharge Planning.    Patient reported feeling content and a bit anxious about the new group today. Patient discussed working toward relationship improvement. Patient identified needing to develop skills they will use to address their goal(s). Patient reported patterns and habits may be a barrier to working toward their goal(s) and/or addressing mental health symptoms. Patient reported no safety concerns and/or self-injurious behaviors. Patient reported no substance use. Patient reported they are taking their medications as prescribed. Patient reported feeling proud that they are sticking with the group process. Patient discussed relationships and grief with the treatment group.       Therapeutic Interventions/Treatment Strategies:  Psychotherapist offered support, feedback and validation and reinforced use of skills. Treatment modalities used include Motivational Interviewing and Cognitive Behavioral Therapy. Interventions include Behavioral Activation: Reinforced benefits/challenges of change process through applying skills to replace  unwanted behaviors and Explored how behaviors effect mood and interact with thoughts and feelings and Cognitive Restructuring:  Explored impact of ineffective thoughts / distortions on mood and activity and Assisted patient in formulating new neutral/positive alternatives to challenge less helpful / ineffective thoughts.    Assessment:    Patient response:   Patient responded to session by accepting feedback and giving feedback    Possible barriers to participation / learning include: none identified    Health Issues:   None reported       Substance Use Review:   Substance Use: No active concerns identified.    Mental Status/Behavioral Observations  Appearance:   Appropriate   Eye Contact:   Good   Psychomotor Behavior: Normal   Attitude:   Cooperative   Orientation:   All  Speech   Rate / Production: Normal    Volume:  Normal   Mood:    Anxious   Affect:    Appropriate   Thought Content:   Clear  Thought Form:  Coherent  Logical     Insight:    Good     Plan:     Safety Plan: No current safety concerns identified.  Recommended that patient call 911 or go to the local ED should there be a change in any of these risk factors.     Barriers to treatment: None identified    Patient Contracts (see media tab):  None    Substance Use: Not addressed in session     Continue or Discharge: Patient will continue in Adult Day Treatment (ADT)  as planned. Patient is likely to benefit from learning and using skills as they work toward the goals identified in their treatment plan.      Sridhar Johnston, DANYFT  May 15, 2023

## 2023-05-15 NOTE — PROGRESS NOTES
"[facility]   Adult Mental Health Outpatient Programs  Provider Interval History Note    Program: Combined Intensive Outpatient/Adult Day Treatment Program (track A2)    PATIENT'S NAME: Everette Mayen  MRN:   [id number]  :   1975  ACCT. NUMBER: [id number]  DATE OF SERVICE: 5/15/23    Interval History:  \"I really this it's working.\" Mable presents today for follow-up and ongoing program supervision.   Endorses:    \"Place I'm not getting any progress on is [outpatient psychiatry]\"  o Patient will call and schedule with the resident clinic soon    \"This is my first run with a non-lithium [medication] and boy, do I feel stable\"  o \"I still have emotions, which is good\"   - \"Still cry... get angry, still get nervous\"  o Anxiety improved overall: \"I was getting panicky  [and] I haven't had any of those\"    \"I feel like I can communicate clearly\"    \"I feel a little less creative, though\"  o \"Still a lot of doubt.. 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, mixed media, drawing)    \"Still working with my [individual] therapist on childhood trauma\"  o Including EMDR    Has been considering career and next steps in her research and focus    Symptoms/Systems:    Sleep: \"I'm sleeping\"    Appetite: \"stable\"  o Endorses, \"struggles with compulsive eating sometimes\"    Focus/attention improving    Struggling with motivation to some extent    Daily function/ADLs: see above    Hygiene: no concerns    Socialization: no concerns    Concerns about work or ability to work: able to work, concentration improving, engaged with career planning    Substance use:    Rare alcohol, 1 THC gummy recently with no effect and no plans to repeat    Reactions/thoughts about program:    \"I like this program!\"    Safety Assessment:    Suicidal ideation: none endorsed    Thoughts of non-suicidal self-injury: none endorsed    Recent self-injurious behavior: none " endorsed    Homicidal ideation: none endorsed    Other safety concerns: none endorsed    Medications:  Current Outpatient Medications   Medication Sig Dispense Refill     adalimumab (HUMIRA) 40 MG/0.8ML prefilled syringe kit Inject 0.8 mLs (40 mg) Subcutaneous every 14 days 2 each 5     amLODIPine-benazepril (LOTREL) 5-10 MG capsule Take 1 capsule by mouth daily 90 capsule 3     clobetasol (TEMOVATE) 0.05 % external ointment Apply topically 2 times daily as needed (Psoriasis) 60 g 11     lurasidone (LATUDA) 20 MG TABS tablet Take 1 tablet (20 mg) by mouth daily with food 30 tablet 3     traZODone (DESYREL) 50 MG tablet Take 1 tablet (50 mg) by mouth At Bedtime 30 tablet 3       The above list was discussed briefly with patient today.     Patient is taking medications as prescribed and denies adverse effects    Laboratory Results:  Most recent labs reviewed. Pertinent updates/findings: None.     Metrics:  PHQ-9 scores:       10/27/2020     3:19 PM 3/15/2023     1:39 PM 3/29/2023     9:59 AM 4/21/2023     9:02 AM   PHQ-9 SCORE   PHQ-9 Total Score MyChart   17 (Moderately severe depression) 9 (Mild depression)   PHQ-9 Total Score 10 17 17 9       AVNI-7 scores:       3/15/2023     1:39 PM 3/29/2023    10:01 AM 4/21/2023     9:03 AM   AVNI-7 SCORE   Total Score  16 (severe anxiety) 8 (mild anxiety)   Total Score 16 16 8    8    8    8       CSSR-S: Lamar Suicide Severity Rating Scale (Short Version)      10/20/2020     8:10 PM 3/27/2023    12:47 PM 3/29/2023    10:00 AM   Lamar Suicide Severity Rating (Short Version)   Over the past 2 weeks have you felt down, depressed, or hopeless? no yes    Over the past 2 weeks have you had thoughts of killing yourself? no yes    Have you ever attempted to kill yourself? no no    Q1 Wished to be Dead (Past Month)  yes yes   Q2 Suicidal Thoughts (Past Month)  yes yes   Q3 Suicidal Thought Method  no no   Q4 Suicidal Intent without Specific Plan  no no   Q5 Suicide Intent with  "Specific Plan  no no   Q6 Suicide Behavior (Lifetime)  no no   Level of Risk per Screen  low risk low risk   High Risk Required Interventions  On continuous in person observation    Required Interventions  Patient searched;Belongings removed    Interventions  DEC consulted;Monitored via video          Mental Status Examination:  Vital Signs: /65 (BP Location: Right arm, Patient Position: Sitting)   Pulse 69   SpO2 97%    Appearance: appropriately groomed, appears stated age, and in no apparent distress.  Attitude: cooperative   Eye Contact: good   Muscle Strength and Tone: no gross abnormalities   Psychomotor Behavior: normal or unremarkable   Gait and Station: deferred  Speech: normal rate, production, volume, and rhythm of  Associations: No loosening of associations  Thought Process: coherent and goal directed  Thought Content: no evidence of suicidal ideation or homicidal ideation, no evidence of psychotic thought, no auditory hallucinations present and no visual hallucinations present  Mood: \"better\"  Affect: mood congruent, intensity is normal, constricted mobility and reactive  Insight: good  Judgment: intact, adequate for safety  Impulse Control: intact  Oriented to: time, place, person and situation  Attention Span and Concentration: normal  Language: Intact  Recent and Remote Memory: intact to interview. Not formally assessed. No amnesia.  Fund of Knowledge/Assessment of Intelligence: Above average  Capacity of Activities of Daily Living: Independent, able to participate in programmatic care services.    Diagnosis/es:    ICD-10-CM    1. Bipolar 2 disorder (H)  F31.81         Have not ruled out:  300.02 (F41.1) Generalized Anxiety Disorder  309.81 (F43.10) Posttraumatic Stress Disorder (includes Posttraumatic Stress Disorder for Children 6 Years and Younger)  Without dissociative symptoms    Assessment/Plan:  Mable presents today for follow up. Endorses continued improvement and stability on current " combination of medication and therapy. Patient has made the transition to a new group and 2 in-person programming supposedly. I observed patient is making good progress and would be concerned that any interruption in current therapy could contribute to decompensation. Goal, as always, is to prevent rehospitalization. No indication for changes to medication today. Continue intensive outpatient, which remains the appropriate level of care.      Bipolar disorder  o Overall improved   o Continue current medications  o Continue intensive outpatient      Anxiety  o Overall improved  o Plan as noted above      Trauma history, rule out PTSD  o Overall improved  o Plan as noted above    Continue all other medications as reviewed per electronic medical record today    Continue therapy as planned:    Enrolled in intensive outpatient    Continues to benefit from services    Continues to meet criteria for this level of care    Patient would be at reasonable risk of requiring a higher level of care in the absence of current services.    I feel this patient does not meet criteria for an involuntary hold and is appropriate for treatment at an outpatient level of care.    Continue with individual therapist as appropriate    Safety plan reviewed:    To the Emergency Department as needed or call after hours crisis line at [phone] or [phone]. [place] Crisis Text Line: Text [place] to 739146 or Suicide LifeLine Chat: suicidepreventionlifeline.org/chat    Follow-up:     schedule an appointment with me or another program provider in approximately in 4 week(s) or sooner if needed.  Can speak with a staff member or call the appropriate program number (see below) to schedule    Follow up with outpatient provider(s) as planned or sooner if needed for acute medical concerns.    Questions or concerns:    Call program line with questions or concerns (see below)    Blendinhart may be used to communicate with your provider, but this is not  intended to be used for emergencies.    [facility] Adult Mental Health Program lines:  [facility]: [phone]  Dual Disorder: [phone]  Adult Day Treatment:  [phone]  55+/Intensive Outpatient: [phone]    Community Resources:    National Suicide Prevention Lifeline: 988 from any phone, or 788-232-5587 (TTY: 794.295.1480). Call anytime for help.  (www.suicidepreventionlifeline.org)  [employer] on Mental Illness (www.tiny.org): 447.688.2364 or 814-982-5483.   Mental Health Association (www.mentalhealth.org): 955.691.2799 or 150-412-2113.  [place] Crisis Text Line: Text [place] to 605501  Suicide LifeLine Chat: suicideSova.org/chat    Treatment Objective(s) Addressed in This Session:  The purpose of today's virtual visit is for this writer to provide oversight of patient's care while receiving program services. Specific treatment goals addressed included personal safety, symptoms stabilization and management, wellness and mental health, and community resources/discharge planning.     This author or another program provider will follow up with the patient as noted above.     Patient agrees with the current plan of care.    Jt Winston MD  5/15/23      Visit Details:  Type of service: In-person    Location (patient and provider): [facility] Adult Mental Health Outpatient Programmatic Care Offices        Medical Decision Making  The patient's presentation was of moderate complexity (2 or more stable/improving chronic illnesses).    The patient's evaluation involved:  discussion of management or test interpretation with another health professional (Other program staff/therapists)    The patient's management necessitated moderate risk (prescription drug management including medications).    25 minutes spent on the date of the encounter doing chart review, patient visit, documentation and discussion with other provider(s)    This document completed in part using  [employer] dictation software.  Please excuse any inadvertent word or phrase substitutions.

## 2023-05-15 NOTE — GROUP NOTE
Psychotherapy Group Note    PATIENT'S NAME: Everette Mayen  MRN:   [id number]  :   1975  ACCT. NUMBER: [id number]  DATE OF SERVICE: 5/15/23  START TIME: 11:00 AM  END TIME: 11:50 AM  FACILITATOR: Azeb Emanuel PsyD  TOPIC:  EBP Group: Behavioral Activation  [facility] Mental Health Day Treatment  TRACK: A2    NUMBER OF PARTICIPANTS: 7    Summary of Group / Topics Discussed:  Behavioral Activation: Motivation and Procrastination: Patients explored how they currently spend their time, identifying thoughts and feelings that are motivating and serve to increase desired behaviors.  They also examined behaviors that contribute to procrastination.  Different types of procrastination behaviors were identified, and strategies to reduce individual procrastination and increase motivation were explored and practiced.  Patients identified ways to increase goal-directed activities to enhance mood and reduce symptoms.        Patient Session Goals / Objectives:    Identify current patterns of procrastination behavior and how they influence thoughts and moods, and inhibit motivation.    Identify behaviors that can be implemented that contribute to improving thoughts and feelings, motivation, and reduce symptoms.    Identify and develop a plan to increase activities that promote a sense of accomplishment and competence.    Practice scheduling positive activities / behaviors into daily routines.      Patient Participation / Response:  Fully participated with the group by sharing personal reflections / insights and openly received / provided feedback with other participants.  The group participated in a discussion about motivation and procrastination and how anxiety, depression, panic attacks, can interfere with concentration and cause trouble.  The group discussed moods and how long it takes to change a behavior.   Expressed understanding of the relationship between behaviors, thoughts, and  feelings    Treatment Plan:  Patient has a current master individualized treatment plan.  See Epic treatment plan for more information.    Natalee Eagle Psy., D,  Licensed Clinical Psychologist

## 2023-05-16 ENCOUNTER — HOSPITAL ENCOUNTER (OUTPATIENT)
Dept: BEHAVIORAL HEALTH | Facility: CLINIC | Age: 48
Discharge: HOME | End: 2023-05-16
Attending: PSYCHIATRY & NEUROLOGY
Payer: COMMERCIAL

## 2023-05-16 PROCEDURE — 90853 GROUP PSYCHOTHERAPY: CPT | Performed by: PSYCHOLOGIST

## 2023-05-16 PROCEDURE — 90853 GROUP PSYCHOTHERAPY: CPT | Performed by: MARRIAGE & FAMILY THERAPIST

## 2023-05-16 NOTE — PROGRESS NOTES
"Client came to A2 IOP process group too late to bill.  She briefly checked in sharing frustration after entering the wrong entrance of the hospital.  Her goal is to \"make the program work for me\".  She will use skills to promote patience.  Barrier to her goal includes her \"quickness to react\".  She is grateful for her \"sense of direction\" in finding the right building/program room.  Client did not report any suicidal ideation, plan or intent.  "

## 2023-05-16 NOTE — GROUP NOTE
Psychotherapy Group Note    PATIENT'S NAME: Everette Mayen  MRN:   [id number]  :   1975  ACCT. NUMBER: [id number]  DATE OF SERVICE: 23  START TIME: 11:00 AM  END TIME: 11:50 AM  FACILITATOR: Azeb Emanuel PsyD  TOPIC:  EBP Group: Self-Awareness  [facility] Mental [facility] Day Treatment  TRACK: A2    NUMBER OF PARTICIPANTS: 6    Summary of Group / Topics Discussed:  Self-Awareness: Values: Patients identified personal values by examining development of their current values and how their values influence their daily functioning and life choices. Patients explored the impact of their values on their thoughts, feelings, and actions. Patients discussed definition of personal values and how they develop and change over time. The goal is to help patients reconcile value conflicts and achieve balance and flexibility to improve mood and daily functioning.     Patient Session Goals / Objectives:    Examined development of values and impact of values on functioning    Identified and prioritized important values related to current well-being     Identified strategies to change or enhance values to positively impact symptoms    Assisted patients to find ways to adapt functioning to better fit their values      Patient Participation / Response:  Moderately participated, sharing some personal reflections / insights and adequately adequately received / provided feedback with other participants.    Identified / Expressed readiness to act intentionally, increase self-compassion, promote personal growth  The group participated in a conversation about trust and examined different aspect of it from a Lesley Choi talk. They talked about accountability, reliability, and keeping secrets. The also discussed confirmation bias and how one's thoughts and perceptions can be changed to fi  Treatment Plan:  Patient has a current master individualized treatment plan.  See Epic treatment plan for more  information.    Natalee Eagle Psy., IMTIAZ,  Licensed Clinical Psychologist

## 2023-05-16 NOTE — GROUP NOTE
Psychotherapy Group Note    PATIENT'S NAME: Everette Mayen  MRN:   [id number]  :   1975  ACCT. NUMBER: [id number]  DATE OF SERVICE: 23  START TIME: 10:00 AM  END TIME: 10:50 AM  FACILITATOR: Sridhar Johnston LMFT  TOPIC: MH EBP Group: Relationship Skills  [facility] Mental [facility] Day Treatment  TRACK: A-2    NUMBER OF PARTICIPANTS: 7    Summary of Group / Topics Discussed:  Relationship Skills: Basic Communication: Patients were provided with a general overview of interpersonal communication skills and information about how communication skills impact symptoms and functioning. The goal of this topic is to help patients identify communication issues and gain skills to work towards healthier interpersonal communication. Patients reviewed their current awareness of communication issues and how communication skills impact relationships and functioning.      Patient Session Goals / Objectives:    Identified and discussed patients individual challenges with basic communication    Presented and practiced effective communication skills in session    Assisted patients in implementing more effective communication skills in their relationships      Patient Participation / Response:  Fully participated with the group by sharing personal reflections / insights and openly received / provided feedback with other participants.    Demonstrated understanding of topics discussed through group discussion and participation, Demonstrated understanding of relationship skills and communication skills and Identified / Expressed personal readiness to incorporate effective communication skills    Treatment Plan:  Patient has a current master individualized treatment plan.  See Epic treatment plan for more information.    MIREYA Ontiveros

## 2023-05-18 ENCOUNTER — PRE VISIT (OUTPATIENT)
Dept: ONCOLOGY | Facility: CLINIC | Age: 48
End: 2023-05-18
Payer: COMMERCIAL

## 2023-05-18 ENCOUNTER — ONCOLOGY VISIT (OUTPATIENT)
Dept: ONCOLOGY | Facility: CLINIC | Age: 48
End: 2023-05-18
Attending: OBSTETRICS & GYNECOLOGY
Payer: COMMERCIAL

## 2023-05-18 ENCOUNTER — HOSPITAL ENCOUNTER (OUTPATIENT)
Dept: BEHAVIORAL HEALTH | Facility: CLINIC | Age: 48
Discharge: HOME | End: 2023-05-18
Attending: PSYCHIATRY & NEUROLOGY
Payer: COMMERCIAL

## 2023-05-18 VITALS
SYSTOLIC BLOOD PRESSURE: 109 MMHG | HEART RATE: 64 BPM | OXYGEN SATURATION: 97 % | TEMPERATURE: 97.7 F | WEIGHT: 146.8 LBS | BODY MASS INDEX: 27.02 KG/M2 | HEIGHT: 62 IN | RESPIRATION RATE: 16 BRPM | DIASTOLIC BLOOD PRESSURE: 72 MMHG

## 2023-05-18 DIAGNOSIS — A63.0 HPV (HUMAN PAPILLOMA VIRUS) ANOGENITAL INFECTION: Primary | ICD-10-CM

## 2023-05-18 DIAGNOSIS — F31.76 BIPOLAR DISORDER, IN FULL REMISSION, MOST RECENT EPISODE DEPRESSED (H): ICD-10-CM

## 2023-05-18 DIAGNOSIS — Z92.25 HISTORY OF IMMUNOSUPPRESSION THERAPY: ICD-10-CM

## 2023-05-18 PROCEDURE — 99203 OFFICE O/P NEW LOW 30 MIN: CPT | Mod: 25 | Performed by: OBSTETRICS & GYNECOLOGY

## 2023-05-18 PROCEDURE — 88305 TISSUE EXAM BY PATHOLOGIST: CPT | Mod: TC | Performed by: OBSTETRICS & GYNECOLOGY

## 2023-05-18 PROCEDURE — G0463 HOSPITAL OUTPT CLINIC VISIT: HCPCS | Mod: 25 | Performed by: OBSTETRICS & GYNECOLOGY

## 2023-05-18 PROCEDURE — 90853 GROUP PSYCHOTHERAPY: CPT

## 2023-05-18 PROCEDURE — 57455 BIOPSY OF CERVIX W/SCOPE: CPT | Performed by: OBSTETRICS & GYNECOLOGY

## 2023-05-18 PROCEDURE — 88305 TISSUE EXAM BY PATHOLOGIST: CPT | Mod: 26 | Performed by: PATHOLOGY

## 2023-05-18 PROCEDURE — 90853 GROUP PSYCHOTHERAPY: CPT | Performed by: PSYCHOLOGIST

## 2023-05-18 ASSESSMENT — PAIN SCALES - GENERAL: PAINLEVEL: NO PAIN (0)

## 2023-05-18 NOTE — GROUP NOTE
Psychotherapy Group Note    PATIENT'S NAME: Everette Mayen  MRN:   [id number]  :   1975  ACCT. NUMBER: [id number]  DATE OF SERVICE: 23  START TIME: 11:00 AM  END TIME: 11:50 AM  FACILITATOR: Azeb Emanuel PsyD  TOPIC:  EBP Group: Behavioral Activation  [facility] Adult Mental Health Day Treatment  TRACK: A2    NUMBER OF PARTICIPANTS: 6    Summary of Group / Topics Discussed:  Behavioral Activation: Activity Scheduling:Patients explored how they currently spend their time, and how specific behaviors impact thoughts and feelings.  The group explored the effect of negative and positive activities on mood states and thought patterns.  Patients identified activities that help to improve mood and thinking patterns, and developed a plan to implement positive activities between sessions.      Patient Session Goals / Objectives:    Identify impact of current behaviors on thoughts and mood    Identify 2-3 behavioral changes that could have a positive impact on thoughts and mood    Prepare to make desired behavioral change: Create a change plan / activity schedule      Patient Participation / Response:  Fully participated with the group by sharing personal reflections / insights and openly received / provided feedback with other participants.    Expressed understanding of the relationship between behaviors, thoughts, and feelings  he group participated in a discussion about events that changed their moods and helped them feel better.  They shared pleasant experiences with each other.Treatment Plan:  Patient has a current master individualized treatment plan.  See Epic treatment plan for more information.    Natalee Eagle Psy., D,  Licensed Clinical Psychologist

## 2023-05-18 NOTE — TELEPHONE ENCOUNTER
Pt requesting refills of latuda and trazodone but has (3) refills on file. Called pt, they will call their pharmacy to a refill.     Marked request is complete

## 2023-05-18 NOTE — PATIENT INSTRUCTIONS
SCHEDULING:  -RTC in 1 year for HPV-based testing (MD, in-person, HPV clinic)      DIAGNOSIS:  Non-16/18 hrHPV+ cervical cancer screening. Medical history significant for immunosuppressive medication.       PLAN:  1) non-16/18 hrHPV+: We will notify you of the pathology results via [employer].  -If HSIL: Plan for excisional procedure.  -If LSIL or normal: Repeat HPV-based testing (HPV +/- Pap test) in 1 year.     Please call with any questions or concerns. [phone]     Jess Hernandez MD, MS, FACOG, FACS  5/18/2023  4:18 PM

## 2023-05-18 NOTE — PROGRESS NOTES
"Consult Note on Referred Patient  Gynecologic Oncology HPV Clinic  [facility]      Date: 2023       Self-referred      Primary Care Provider:  Katelynn Schwartz  [id number]  [facility] [address]     RE: Everette Mayen  : 1975  URMILA: 2023      Reason for visit: non-16/18 hrHPV+ cervical cancer screening in the setting of immunosuppression.       History of Present Illness:   Everette Alejandro \"Mable\" Tiarra (she/her/hers)  is a 47 year old self-referred to the Gynecologic Oncology HPV Clinic on 2023 for evaluation of a non-16/18 hrHPV+ cervical cancer screening result. Medical history significant for psoriasis on immunosuppressive medication.  History as follows:    *HPV vaccination status: Vaccinated (3 doses in her 20s)    08, 09, 12: Pap test NILM.    16:  Pap test NILM, hrHPV-.     10/27/20: Pap test NILM, hrHPV-.    3/15/23: Pap test NILM, non-16/18 hrHPV+.  -Repeat HPV-based testing in 1 year recommended per ASCCP guidelines.      Subjective:  Mable presents to the gyn onc HPV clinic for a scheduled consultation, unaccompanied.  Mable reports that this clinic was recommended by my colleague Dr. Crystal Yarbrough. Mable reports that she was vaccinated against HPV 16,18 as a  as part of the original vaccination trial. She thinks this could be an incident infection, as she has had one new partner.   No concerning symptoms. She has had no abnormal bleeding, no abnormal vaginal discharge.   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      Review of Systems:   ROS: 10 point ROS neg other than the symptoms noted above in the HPI.       Past Medical History:  Past Medical History:   Diagnosis Date     Allergic rhinitis, cause unspecified      Depressive disorder, not elsewhere " classified      Hypertension 2008     Other psoriasis        Past Surgical History:  Past Surgical History:   Procedure Laterality Date     ABDOMINOPLASTY           Gynecologic History:  Perimenopausal.  No history of hormone therapy.  No history of STIs.  Currently sexually active, no dyspareunia, no postcoital bleeding.       Obstetric History:  OB History    Para Term  AB Living   1 0 0 0 1 0   SAB IAB Ectopic Multiple Live Births   0 0 0 0 0      # Outcome Date GA Lbr Souleymane/2nd Weight Sex Delivery Anes PTL Lv   1 AB                 Current Medications:   Current Outpatient Medications   Medication     adalimumab (HUMIRA) 40 MG/0.8ML prefilled syringe kit     amLODIPine-benazepril (LOTREL) 5-10 MG capsule     clobetasol (TEMOVATE) 0.05 % external ointment     lurasidone (LATUDA) 20 MG TABS tablet     traZODone (DESYREL) 50 MG tablet     No current facility-administered medications for this visit.        Allergies:   Allergies   Allergen Reactions     Sulfa Antibiotics Hives     Lupus Erythematous     Sulfacetamide Hives and Itching     Lupus Erythematous     Lamotrigine Rash     Lamotrigine Rash         Social History:  Patient lives independently. Works as an epidemiology professor at [employer]. She does not have Advanced Directives, but has identified her ex-spouse Hussein Downs as her desired Healthcare Power of .    Social History     Tobacco Use     Smoking status: Never     Smokeless tobacco: Never   Vaping Use     Vaping status: Never Used   Substance Use Topics     Alcohol use: Yes     Alcohol/week: 0.0 standard drinks of alcohol     Comment: < 1 drink / week       History   Drug Use No       Family History:   No known family history of breast, ovarian, uterine, colon, urothelial/renal, prostate or pancreatic cancers.  No known family history of melanoma.   Family History   Problem Relation Age of Onset     Hypertension Mother      Allergies Mother      Arthritis Mother          "psoriasis     Depression Mother      Genitourinary Problems Mother         urinary incontinence     Gynecology Mother         hyst age 48     Substance Abuse Father      Alcohol/Drug Father      Depression Father      Diabetes Maternal Grandmother      Eye Disorder Maternal Grandmother      Cerebrovascular Disease Maternal Grandfather      Cardiovascular Paternal Grandfather      Genetic Disorder Son         No further genetic testing planned, microcephaly     Seizure Disorder Son          age 11     Other Cancer Other      Melanoma No family hx of      Skin Cancer No family hx of      Coronary Artery Disease No family hx of      Hyperlipidemia No family hx of      Breast Cancer No family hx of      Colon Cancer No family hx of      Prostate Cancer No family hx of      Anxiety Disorder No family hx of      Anesthesia Reaction No family hx of      Asthma No family hx of      Osteoporosis No family hx of      Thyroid Disease No family hx of        Physical Exam:   /72   Pulse 64   Temp 97.7  F (36.5  C) (Oral)   Resp 16   Ht 1.575 m (5' 2\")   Wt 66.6 kg (146 lb 12.8 oz)   SpO2 97%   BMI 26.85 kg/m    Body mass index is 26.85 kg/m .    General Appearance: healthy and alert, no distress     HEENT:  no thyromegaly, no palpable nodules or masses   Musculoskeletal: extremities non tender and without edema    Skin: no lesions or rashes     Neurological: normal gait, no gross defects     Psychiatric: appropriate mood and affect                               Hematological: normal cervical, supraclavicular and inguinal lymph nodes     Genitourinary: External genitalia and urethral meatus appears normal.  Vagina and cervix are grossly normal.       Procedure Risk Statement:  The patient was counseled regarding risks, benefits and alternatives to colposcopy, possible biopsies.  Risks discussed include but not limited to:  Bleeding; infection; injury to adjacent organs; inadequate sample or false negative result. "  The patient's questions were answered, and informed consent signed.       Procedure:   After the informed consent was signed and time-out procedure was performed, dilute acetic acid was applied to the cervix. Colposcopy performed.  -Squamocolumnar junction fully visualized? Yes  -Acetowhite changes? Yes--7:00-11:00  -Punctations, mosaicism, abnormal vasculature, other abnormalities? Yes--fine punctations  -Clinical Impression: LSIL  -Specimens: Ectocervix 11:00  Ectocervical biopsy taken with Tischler forceps. Hemostasis achieved with application of silver nitrate.        Performance Status:  ECOG Grade 0.       Assessment:  Mable Mayen (she/her/hers)  is a 47 year old  woman with a diagnosis of non-16/18 hrHPV+ cervical cancer screening in the setting of immunosuppressive medication for psoriasis, colposcopic impression LSIL, pathology results pending.     A total of 30 minutes was spent with the patient, 20 minutes of which were spent in counseling the patient and/or treatment planning, 5 minutes of which were spent in the procedure above; an additional 5 minutes was spent in chart review/documentation.      Plan:   1.)    Non-16/18 hrHPV+: I reviewed the results above with Mable. Discussed that the ASCCP management guidelines recommend repeat screening in 1 year for a single non-16/19 hrHPV+ test result for persons at average risk. However, while there are recommendations for more frequent screening for individuals on immunosuppressive medications, there are no guidelines for management of hrHPV+ test results. Thus colposcopy performed today.     We will notify Mable of the pathology results via [employer].  -If histologic HSIL: Recommend excisional procedure.   -If histologic LSIL or normal: Repeat HPV-based testing in 1 year.      2.) Genetic risk factors were assessed and the patient does not meet the qualifications for a referral.      3.) Labs and/or tests ordered include:  Surgical pathology: Ectocervix  11:00.     4.) Health maintenance issues addressed today include none.    5.) Code status:  Full-code.    6.) Prescriptions: None.      Jess Hernandez MD, MS, FACOG, FACS  5/18/2023  4:23 PM        CC  Patient Care Team:  Katelynn Schwartz PA-C as PCP - General (Family Medicine)  Moo Montes MD as MD (Dermatology)  Katelynn Schwartz PA-C as Assigned PCP  Fabby Levine APRN CNM as Assigned OBGYN Provider  Jess Hernandez MD as MD (Gynecologic Oncology)  Jt Winston MD as MD (Psychiatry)

## 2023-05-18 NOTE — GROUP NOTE
Psychotherapy Group Note    PATIENT'S NAME: Everette Mayen  MRN:   [id number]  :   1975  ACCT. NUMBER: [id number]  DATE OF SERVICE: 23  START TIME: 10:00 AM  END TIME: 10:50 AM  FACILITATOR: Celsa Agruello LGSW  TOPIC: MH EBP Group: Coping Skills  [facility] Adult Mental Health Day Treatment  TRACK: A2    NUMBER OF PARTICIPANTS: 6    Summary of Group / Topics Discussed:  Coping Skills: Grounding: Patients discussed and practiced strategies to increase attachment / presence to the current moment.  Patients identified situations in which using these strategies will help improve emotion regulation sense of calm in the body.  Reviewed the benefits of applying grounding strategies, as well as past / current practices of each member.  Patients identified situations in which using these strategies would reduce stress. They developed the ability to distinguish when these strategies can be useful in their lives for management and stress and psychological well-being.    Patient Session Goals / Objectives:    Understand the purpose of using grounding strategies to reduce stress.    Verbalize understanding of how and when to apply grounding strategies to reduce distress and increase presence in the moment.    Review patients current grounding practices and discuss a more formal way of practicing and accessing skills.    Practice using various calming strategies (e.g. 5-4-3-2-1; mental and body awareness).    Choose 1-2 grounding strategies to apply during times of distress.        Patient Participation / Response:  Moderately participated, sharing some personal reflections / insights and adequately adequately received / provided feedback with other participants.    Demonstrated understanding of topics discussed through group discussion and participation and Practiced 2-3 new coping skills in session    Treatment Plan:  Patient has a current master individualized treatment plan.  See Epic treatment plan  for more information.    Celsa Arguello LGSW

## 2023-05-18 NOTE — GROUP NOTE
"Process Group Note    PATIENT'S NAME: Everette Mayen  MRN:   [id number]  :   1975  ACCT. NUMBER: [id number]  DATE OF SERVICE: 23  START TIME:  9:00 AM  END TIME:  9:50 AM  FACILITATOR: Celsa Arguello LGSW  TOPIC:  Process Group    Diagnoses:  296.89 Bipolar II Disorder Hypomanic  296.32 (F33.1) Major Depressive Disorder, Recurrent Episode, Moderate _ and With melancholic features.  300.02 (F41.1) Generalized Anxiety Disorder.      [facility] Adult Mental Health Day Treatment  TRACK: A2    NUMBER OF PARTICIPANTS: 7          Data:    Session content: At the start of this group, patients were invited to check in by identifying themselves, describing their current emotional status, and identifying issues to address in this group.   Area(s) of treatment focus addressed in this session included Symptom Management, Personal Safety and Community Resources/Discharge Planning.  Reported mood is anxious and \"off\" (\"I feel like I'm faking it [happiness]\"). She reports this as a new feeling.  She gave an example of attending a recent ceremony and feeling \"nothing\".  Client denied safety concerns, including SI and SIB. Client denies any chemical use.  Client is taking medications as prescribed. She requested a refill from Dr. Winston and writer agreed to coordinate this request. Client's goal is to reflect on mood and related stressors.  She identified work as a current stressor, particularly being uncertain about the status of her team's jobs which are currently up for renewal. She reflected that she has been \"more worried than I needed to be\" and given reassurance from coworkers. Client reported plans to use the following coping skills: check the facts, reaching out more, trusting reassurance when given to her. Client talked about listening to her emotions, thoughts and behaviors as \"signals\". Client is proud of attending group and is grateful for the opportunity to be in IOP. Peers offered " supportive feedback and validation.    Therapeutic Interventions/Treatment Strategies:  Psychotherapist offered support, feedback and validation and reinforced use of skills. Treatment modalities used include Cognitive Behavioral Therapy. Interventions include Cognitive Restructuring:  Explored impact of ineffective thoughts / distortions on mood and activity and Assisted patient in formulating new neutral/positive alternatives to challenge less helpful / ineffective thoughts and Emotions Management:  Reinforced the purpose and biological basis of emotions and Increased awareness of daily mood patterns/changes.    Assessment:    Patient response:   Patient responded to session by accepting feedback, giving feedback, listening, focusing on goals, being attentive and accepting support    Possible barriers to participation / learning include: and no barriers identified    Health Issues:   None reported       Substance Use Review:   Substance Use: No active concerns identified.    Mental Status/Behavioral Observations  Appearance:   Appropriate   Eye Contact:   Good   Psychomotor Behavior: Normal   Attitude:   Cooperative  Interested Friendly Pleasant  Orientation:   All  Speech   Rate / Production: Normal    Volume:  Normal   Mood:    Anxious   Affect:    Appropriate   Thought Content:   Clear and Safety denies any current safety concerns including suicidal ideation, self-harm, and homicidal ideation  Thought Form:  Coherent  Logical     Insight:    Good     Plan:     Safety Plan: No current safety concerns identified.  Recommended that patient call 911 or go to the local ED should there be a change in any of these risk factors.     Barriers to treatment: None identified    Patient Contracts (see media tab):  None    Substance Use: Not addressed in session     Continue or Discharge: Patient will continue in Adult Day Treatment (ADT)  as planned. Patient is likely to benefit from learning and using skills as they work  toward the goals identified in their treatment plan.      Celsa Arguello, NICOLE  May 18, 2023

## 2023-05-18 NOTE — PROGRESS NOTES
"Oncology Rooming Note    May 18, 2023 3:44 PM   Everette Mayen is a 47 year old female who presents for:    Chief Complaint   Patient presents with     Oncology Clinic Visit     Initial Vitals: There were no vitals taken for this visit. Estimated body mass index is 26.56 kg/m  as calculated from the following:    Height as of 4/27/23: 1.575 m (5' 2\").    Weight as of 5/7/23: 65.9 kg (145 lb 3.2 oz). There is no height or weight on file to calculate BSA.  Data Unavailable Comment: Data Unavailable   No LMP recorded. (Menstrual status: Irregular Periods).  Allergies reviewed: Yes  Medications reviewed: Yes    Medications: Medication refills not needed today.  Pharmacy name entered into [employer]: [id number] - [place], [place] - [address]    Clinical concerns:  doctor was notified.      Diane Yo CMA            "

## 2023-05-18 NOTE — LETTER
"    2023         RE: Everette Mayen  [address] [facility] [address]        Dear Colleague,    Thank you for referring your patient, Everette Mayen, to the [facility]. Please see a copy of my visit note below.    Consult Note on Referred Patient  Gynecologic Oncology HPV Clinic  Critical access hospital Clinic      Date: 2023       Self-referred      Primary Care Provider:  Katelynn Schwartz  [address]  [facility] [address]     RE: Everette Mayen  : 1975  URMILA: 2023      Reason for visit: non-16/18 hrHPV+ cervical cancer screening in the setting of immunosuppression.       History of Present Illness:   Everette Alejandro \"Mable\" Tiarra (she/her/hers)  is a 47 year old self-referred to the Gynecologic Oncology HPV Clinic on 2023 for evaluation of a non-16/18 hrHPV+ cervical cancer screening result. Medical history significant for psoriasis on immunosuppressive medication.  History as follows:    *HPV vaccination status: Vaccinated (3 doses in her 20s)    08, 09, 12: Pap test NILM.    16:  Pap test NILM, hrHPV-.     10/27/20: Pap test NILM, hrHPV-.    3/15/23: Pap test NILM, non-16/18 hrHPV+.  -Repeat HPV-based testing in 1 year recommended per ASCCP guidelines.      Subjective:  Mable presents to the gyn onc HPV clinic for a scheduled consultation, unaccompanied.  Mable reports that this clinic was recommended by my colleague Dr. Crystal Yarbrough. Mable reports that she was vaccinated against HPV 16,18 as a  as part of the original vaccination trial. She thinks this could be an incident infection, as she has had one new partner.   No concerning symptoms. She has had no abnormal bleeding, no abnormal vaginal discharge.   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      Review of Systems:   ROS: 10 point ROS neg other than the symptoms noted above in the HPI.       Past Medical History:  Past Medical History:   Diagnosis Date     Allergic rhinitis, cause unspecified      Depressive disorder, not elsewhere classified      Hypertension 2008     Other psoriasis        Past Surgical History:  Past Surgical History:   Procedure Laterality Date     ABDOMINOPLASTY           Gynecologic History:  Perimenopausal.  No history of hormone therapy.  No history of STIs.  Currently sexually active, no dyspareunia, no postcoital bleeding.       Obstetric History:  OB History    Para Term  AB Living   1 0 0 0 1 0   SAB IAB Ectopic Multiple Live Births   0 0 0 0 0      # Outcome Date GA Lbr Souleymane/2nd Weight Sex Delivery Anes PTL Lv   1 AB                 Current Medications:   Current Outpatient Medications   Medication     adalimumab (HUMIRA) 40 MG/0.8ML prefilled syringe kit     amLODIPine-benazepril (LOTREL) 5-10 MG capsule     clobetasol (TEMOVATE) 0.05 % external ointment     lurasidone (LATUDA) 20 MG TABS tablet     traZODone (DESYREL) 50 MG tablet     No current facility-administered medications for this visit.        Allergies:   Allergies   Allergen Reactions     Sulfa Antibiotics Hives     Lupus Erythematous     Sulfacetamide Hives and Itching     Lupus Erythematous     Lamotrigine Rash     Lamotrigine Rash         Social History:  Patient lives independently. Works as an epidemiology professor at [employer]. She does not have Advanced Directives, but has identified her ex-spouse Hussein Downs as her desired Healthcare Power of .    Social History     Tobacco Use     Smoking status: Never     Smokeless tobacco: Never   Vaping Use     Vaping status: Never Used   Substance Use Topics     Alcohol use: Yes     Alcohol/week: 0.0 standard drinks of alcohol     Comment: < 1 drink / week       History   Drug Use No       Family History:   No known  "family history of breast, ovarian, uterine, colon, urothelial/renal, prostate or pancreatic cancers.  No known family history of melanoma.   Family History   Problem Relation Age of Onset     Hypertension Mother      Allergies Mother      Arthritis Mother         psoriasis     Depression Mother      Genitourinary Problems Mother         urinary incontinence     Gynecology Mother         hyst age 48     Substance Abuse Father      Alcohol/Drug Father      Depression Father      Diabetes Maternal Grandmother      Eye Disorder Maternal Grandmother      Cerebrovascular Disease Maternal Grandfather      Cardiovascular Paternal Grandfather      Genetic Disorder Son         No further genetic testing planned, microcephaly     Seizure Disorder Son          age 11     Other Cancer Other      Melanoma No family hx of      Skin Cancer No family hx of      Coronary Artery Disease No family hx of      Hyperlipidemia No family hx of      Breast Cancer No family hx of      Colon Cancer No family hx of      Prostate Cancer No family hx of      Anxiety Disorder No family hx of      Anesthesia Reaction No family hx of      Asthma No family hx of      Osteoporosis No family hx of      Thyroid Disease No family hx of        Physical Exam:   /72   Pulse 64   Temp 97.7  F (36.5  C) (Oral)   Resp 16   Ht 1.575 m (5' 2\")   Wt 66.6 kg (146 lb 12.8 oz)   SpO2 97%   BMI 26.85 kg/m    Body mass index is 26.85 kg/m .    General Appearance: healthy and alert, no distress     HEENT:  no thyromegaly, no palpable nodules or masses   Musculoskeletal: extremities non tender and without edema    Skin: no lesions or rashes     Neurological: normal gait, no gross defects     Psychiatric: appropriate mood and affect                               Hematological: normal cervical, supraclavicular and inguinal lymph nodes     Genitourinary: External genitalia and urethral meatus appears normal.  Vagina and cervix are grossly normal. "       Procedure Risk Statement:  The patient was counseled regarding risks, benefits and alternatives to colposcopy, possible biopsies.  Risks discussed include but not limited to:  Bleeding; infection; injury to adjacent organs; inadequate sample or false negative result.  The patient's questions were answered, and informed consent signed.       Procedure:   After the informed consent was signed and time-out procedure was performed, dilute acetic acid was applied to the cervix. Colposcopy performed.  -Squamocolumnar junction fully visualized? Yes  -Acetowhite changes? Yes--7:00-11:00  -Punctations, mosaicism, abnormal vasculature, other abnormalities? Yes--fine punctations  -Clinical Impression: LSIL  -Specimens: Ectocervix 11:00  Ectocervical biopsy taken with Tischler forceps. Hemostasis achieved with application of silver nitrate.        Performance Status:  ECOG Grade 0.       Assessment:  Mable Mayen (she/her/hers)  is a 47 year old  woman with a diagnosis of non-16/18 hrHPV+ cervical cancer screening in the setting of immunosuppressive medication for psoriasis, colposcopic impression LSIL, pathology results pending.     A total of 30 minutes was spent with the patient, 20 minutes of which were spent in counseling the patient and/or treatment planning, 5 minutes of which were spent in the procedure above; an additional 5 minutes was spent in chart review/documentation.      Plan:   1.)    Non-16/18 hrHPV+: I reviewed the results above with Mable. Discussed that the ASCCP management guidelines recommend repeat screening in 1 year for a single non-16/19 hrHPV+ test result for persons at average risk. However, while there are recommendations for more frequent screening for individuals on immunosuppressive medications, there are no guidelines for management of hrHPV+ test results. Thus colposcopy performed today.     We will notify Mable of the pathology results via [employer].  -If histologic HSIL: Recommend  "excisional procedure.   -If histologic LSIL or normal: Repeat HPV-based testing in 1 year.      2.) Genetic risk factors were assessed and the patient does not meet the qualifications for a referral.      3.) Labs and/or tests ordered include:  Surgical pathology: Ectocervix 11:00.     4.) Health maintenance issues addressed today include none.    5.) Code status:  Full-code.    6.) Prescriptions: None.      Jess Hernandez MD, MS, FACOG, FACS  5/18/2023  4:23 PM        CC  Patient Care Team:  Katelynn Schwartz PA-C as PCP - General (Family Medicine)  Moo Montes MD as MD (Dermatology)  Katelynn Schwartz PA-C as Assigned PCP  Fabby Levine APRN CNM as Assigned OBGYN Provider  Jess Hernandez MD as MD (Gynecologic Oncology)  Jt Winston MD as MD (Psychiatry)        Oncology Rooming Note    May 18, 2023 3:44 PM   Everette Mayen is a 47 year old female who presents for:    Chief Complaint   Patient presents with     Oncology Clinic Visit     Initial Vitals: There were no vitals taken for this visit. Estimated body mass index is 26.56 kg/m  as calculated from the following:    Height as of 4/27/23: 1.575 m (5' 2\").    Weight as of 5/7/23: 65.9 kg (145 lb 3.2 oz). There is no height or weight on file to calculate BSA.  Data Unavailable Comment: Data Unavailable   No LMP recorded. (Menstrual status: Irregular Periods).  Allergies reviewed: Yes  Medications reviewed: Yes    Medications: Medication refills not needed today.  Pharmacy name entered into [employer]: [id number] - [place], [place] - [address]    Clinical concerns:  doctor was notified.      Diane [facility]                Again, thank you for allowing me to participate in the care of your patient.        Sincerely,        Jess Hernandez MD    "

## 2023-05-22 ENCOUNTER — LAB REQUISITION (OUTPATIENT)
Dept: LAB | Facility: CLINIC | Age: 48
End: 2023-05-22
Payer: COMMERCIAL

## 2023-05-22 ENCOUNTER — HOSPITAL ENCOUNTER (OUTPATIENT)
Dept: BEHAVIORAL HEALTH | Facility: CLINIC | Age: 48
Discharge: HOME | End: 2023-05-22
Attending: PSYCHIATRY & NEUROLOGY
Payer: COMMERCIAL

## 2023-05-22 DIAGNOSIS — Z79.899 OTHER LONG TERM (CURRENT) DRUG THERAPY: ICD-10-CM

## 2023-05-22 LAB
ALT SERPL W P-5'-P-CCNC: 14 U/L (ref 10–35)
AST SERPL W P-5'-P-CCNC: 24 U/L (ref 10–35)
BASOPHILS # BLD AUTO: 0.1 10E3/UL (ref 0–0.2)
BASOPHILS NFR BLD AUTO: 1 %
EOSINOPHIL # BLD AUTO: 0.1 10E3/UL (ref 0–0.7)
EOSINOPHIL NFR BLD AUTO: 2 %
ERYTHROCYTE [DISTWIDTH] IN BLOOD BY AUTOMATED COUNT: 12 % (ref 10–15)
HCT VFR BLD AUTO: 41.5 % (ref 35–47)
HGB BLD-MCNC: 13.5 G/DL (ref 11.7–15.7)
IMM GRANULOCYTES # BLD: 0 10E3/UL
IMM GRANULOCYTES NFR BLD: 0 %
LYMPHOCYTES # BLD AUTO: 2.5 10E3/UL (ref 0.8–5.3)
LYMPHOCYTES NFR BLD AUTO: 39 %
MCH RBC QN AUTO: 29.3 PG (ref 26.5–33)
MCHC RBC AUTO-ENTMCNC: 32.5 G/DL (ref 31.5–36.5)
MCV RBC AUTO: 90 FL (ref 78–100)
MONOCYTES # BLD AUTO: 0.5 10E3/UL (ref 0–1.3)
MONOCYTES NFR BLD AUTO: 8 %
NEUTROPHILS # BLD AUTO: 3.2 10E3/UL (ref 1.6–8.3)
NEUTROPHILS NFR BLD AUTO: 50 %
NRBC # BLD AUTO: 0 10E3/UL
NRBC BLD AUTO-RTO: 0 /100
PATH REPORT.COMMENTS IMP SPEC: NORMAL
PATH REPORT.COMMENTS IMP SPEC: NORMAL
PATH REPORT.FINAL DX SPEC: NORMAL
PATH REPORT.GROSS SPEC: NORMAL
PATH REPORT.MICROSCOPIC SPEC OTHER STN: NORMAL
PATH REPORT.RELEVANT HX SPEC: NORMAL
PHOTO IMAGE: NORMAL
PLATELET # BLD AUTO: 346 10E3/UL (ref 150–450)
RBC # BLD AUTO: 4.61 10E6/UL (ref 3.8–5.2)
WBC # BLD AUTO: 6.3 10E3/UL (ref 4–11)

## 2023-05-22 PROCEDURE — 85025 COMPLETE CBC W/AUTO DIFF WBC: CPT | Mod: ORL | Performed by: DERMATOLOGY

## 2023-05-22 PROCEDURE — 90853 GROUP PSYCHOTHERAPY: CPT | Performed by: PSYCHOLOGIST

## 2023-05-22 PROCEDURE — 86803 HEPATITIS C AB TEST: CPT | Mod: ORL | Performed by: DERMATOLOGY

## 2023-05-22 PROCEDURE — 84460 ALANINE AMINO (ALT) (SGPT): CPT | Mod: ORL | Performed by: DERMATOLOGY

## 2023-05-22 PROCEDURE — 90853 GROUP PSYCHOTHERAPY: CPT

## 2023-05-22 PROCEDURE — 84450 TRANSFERASE (AST) (SGOT): CPT | Mod: ORL | Performed by: DERMATOLOGY

## 2023-05-22 PROCEDURE — 87389 HIV-1 AG W/HIV-1&-2 AB AG IA: CPT | Mod: ORL | Performed by: DERMATOLOGY

## 2023-05-22 PROCEDURE — 87340 HEPATITIS B SURFACE AG IA: CPT | Mod: ORL | Performed by: DERMATOLOGY

## 2023-05-22 PROCEDURE — 86481 TB AG RESPONSE T-CELL SUSP: CPT | Mod: ORL | Performed by: DERMATOLOGY

## 2023-05-22 NOTE — GROUP NOTE
"Process Group Note    PATIENT'S NAME: Everette Mayen  MRN:   [id number]  :   1975  ACCT. NUMBER: [id number]  DATE OF SERVICE: 23  START TIME:  9:00 AM  END TIME:  9:50 AM  FACILITATOR: Celsa Arguello LGSW  TOPIC:  Process Group    Diagnoses:  296.89 Bipolar II Disorder Hypomanic  296.32 (F33.1) Major Depressive Disorder, Recurrent Episode, Moderate _ and With melancholic features.  300.02 (F41.1) Generalized Anxiety Disorder.      [facility] Adult Mental Health Day Treatment  TRACK: A2    NUMBER OF PARTICIPANTS: 6          Data:    Session content: At the start of this group, patients were invited to check in by identifying themselves, describing their current emotional status, and identifying issues to address in this group.   Area(s) of treatment focus addressed in this session included Symptom Management, Personal Safety and Community Resources/Discharge Planning.  Client began her check in by asking for clarifications on the radical acceptance skill and physical anxiety. Reported mood is stable. 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  She reports increased acceptance and self-awareness of her anxiety (\"I will have this thing forever, it's about how I manage it\"). She noted a reduction in anxious thoughts after a good night's sleep on Saturday (\"it was day and night\").  Client denied safety concerns, including SI and SIB. Client denies any chemical use.  Client is taking medications as prescribed. Client's goal is continue finding ways to \"protect my sleep\" and manage her anxiety with work.  Client reported plans to use the following coping skills: taking walks, self-soothe, grounding.  She considers these skills as \"instinctual\" and has the desire to learn skills that prevent crises. Peers offered supportive feedback and validation.    Therapeutic Interventions/Treatment Strategies:  Psychotherapist offered " support, feedback and validation and reinforced use of skills. Treatment modalities used include Cognitive Behavioral Therapy. Interventions include Coping Skills: Facilitated discussion on learning and applying radical acceptance skill, Discussed use of self-soothe skills to decrease distress in the body, Promoted understanding of how and when to apply grounding strategies to reduce distress and increase presence in the moment, Facilitated understanding of  what factors may contribute to symptom relapse and skills plan to manage symptom relapse  and Addressed barriers to utilizing coping skills when in distress.    Assessment:    Patient response:   Patient responded to session by accepting feedback, giving feedback, listening, focusing on goals, being attentive and accepting support    Possible barriers to participation / learning include: and no barriers identified    Health Issues:   None reported       Substance Use Review:   Substance Use: No active concerns identified.    Mental Status/Behavioral Observations  Appearance:   Appropriate   Eye Contact:   Good   Psychomotor Behavior: Normal   Attitude:   Cooperative  Interested Friendly Pleasant  Orientation:   All  Speech   Rate / Production: Normal    Volume:  Normal   Mood:    Euthymic  Affect:    Appropriate   Thought Content:   Clear and Safety denies any current safety concerns including suicidal ideation, self-harm, and homicidal ideation  Thought Form:  Coherent  Logical     Insight:    Good     Plan:     Safety Plan: No current safety concerns identified.  Recommended that patient call 911 or go to the local ED should there be a change in any of these risk factors.     Barriers to treatment: None identified    Patient Contracts (see media tab):  None    Substance Use: Not addressed in session     Continue or Discharge: Patient will continue in Adult Day Treatment (ADT)  as planned. Patient is likely to benefit from learning and using skills as they work  toward the goals identified in their treatment plan.      Celsa Arguello, NICOLE  May 22, 2023

## 2023-05-22 NOTE — GROUP NOTE
Psychotherapy Group Note    PATIENT'S NAME: Everette aMyen  MRN:   [id number]  :   1975  ACCT. NUMBER: [id number]  DATE OF SERVICE: 23  START TIME: 11:00 AM  END TIME: 11:50 AM  FACILITATOR: Azeb Emanuel PsyD  TOPIC: MH EBP Group: Coping Skills  [facility] Mental Health Day Treatment  TRACK: A2    NUMBER OF PARTICIPANTS: 6    Summary of Group / Topics Discussed:  Coping Skills: Additional Coping Skills:  Patients discussed and practiced willingesss and willfulness.  Reviewed the benefits of applying the aforementioned coping strategies.  Patients explored how these strategies might be applied to daily stressors or distressing situations.    Patient Session Goals / Objectives:    Understand the purpose and benefits of applying change and willingness coping strategies    Understand the difference between willingness and willfulness      Address barriers to utilizing coping skills when in distress.        Patient Participation / Response:  Fully participated with the group by sharing personal reflections / insights and openly received / provided feedback with other participants.    Identified barriers to applying coping skills  The group participated in an activity that examined willingness and willfulness and shared experiences in the past life where it was present in a job situation, college, or community situation. The group discussed how changes occurred and if they had enough information about the situation to make a change.Treatment Plan:  Patient has a current master individualized treatment plan.  See Epic treatment plan for more information.    Natalee Eagle Psy., D,  Licensed Clinical Psychologist

## 2023-05-22 NOTE — GROUP NOTE
Psychotherapy Group Note    PATIENT'S NAME: Everette Mayen  MRN:   [id number]  :   1975  ACCT. NUMBER: [id number]  DATE OF SERVICE: 23  START TIME: 10:00 AM  END TIME: 10:50 AM  FACILITATOR: Celsa Arguello LGSW  TOPIC: MH EBP Group: Relationship Skills  [facility] Day Treatment  TRACK: A2    NUMBER OF PARTICIPANTS: 6    Summary of Group / Topics Discussed:  Relationship Skills: Dependencies: Patients were provided with a general overview of different types of dependencies and how they relate to symptoms and functioning. The purpose is to help patients identify the different types of dependencies, how they may be impacted by them, and to gain awareness and skills to work towards healthier relationships.    Patient Session Goals / Objectives:    Familiarized patients with the concept of interpersonal relationships and their characteristics.      Discussed and practiced strategies to promote healthier understanding of interpersonal relationships with a focus on dependencies    Identified types of dependencies in patient s lives and how they impact their symptoms and functioning      Patient Participation / Response:  Fully participated with the group by sharing personal reflections / insights and openly received / provided feedback with other participants.    Demonstrated understanding of topics discussed through group discussion and participation, Demonstrated understanding of relationship skills and communication skills, Identified / Expressed personal readiness to incorporate effective communication skills, Verbalized understanding of communication skills, communication challenges, and communication strengths and Identified plan to address barriers to practicing relationship skills     Treatment Plan:  Patient has a current master individualized treatment plan.  See Epic treatment plan for more information.    NICOLE Linares

## 2023-05-23 ENCOUNTER — HOSPITAL ENCOUNTER (OUTPATIENT)
Dept: BEHAVIORAL HEALTH | Facility: CLINIC | Age: 48
Discharge: HOME | End: 2023-05-23
Attending: PSYCHIATRY & NEUROLOGY
Payer: COMMERCIAL

## 2023-05-23 LAB
HBV SURFACE AG SERPL QL IA: NONREACTIVE
HCV AB SERPL QL IA: NONREACTIVE
HIV 1+2 AB+HIV1 P24 AG SERPL QL IA: NONREACTIVE

## 2023-05-23 PROCEDURE — 90853 GROUP PSYCHOTHERAPY: CPT

## 2023-05-23 PROCEDURE — 90853 GROUP PSYCHOTHERAPY: CPT | Performed by: PSYCHOLOGIST

## 2023-05-23 NOTE — GROUP NOTE
Psychotherapy Group Note    PATIENT'S NAME: Everette Mayen  MRN:   [id number]  :   1975  ACCT. NUMBER: [id number]  DATE OF SERVICE: 23  START TIME: 11:00 AM  END TIME: 11:50 AM  FACILITATOR: Azeb Emanuel PsyD  TOPIC:  EBP Group: [facility] Adult Mental Health Day Treatment  TRACK: A2    NUMBER OF PARTICIPANTS: 7    Summary of Group / Topics Discussed:  Mindfulness: Mindfulness Experiential: Patients received an overview on what mindfulness is and how mindfulness can benefit general health, mental health symptoms, and stressors. The history of mindfulness, its application to mental health therapies, and key concepts were also discussed. Patients discussed current awareness, knowledge, and practice of mindfulness skills. Patients also discussed barriers to mindfulness practice.    Patient Session Goals / Objectives:    Demonstrated and verbalized understanding of key mindfulness concepts    Identified when/how to use mindfulness skills    Resolved barriers to practicing mindfulness skills    Identified plan to use mindfulness skills in daily life       Patient Participation / Response:  Fully participated with the group by sharing personal reflections / insights and openly received / provided feedback with other participants.  The group participated in a body scan meditation and talked about how it was helpful. The group participated in a discussion about moods and things such as TV shows, weather, or activities that helped improve their moods.  Identified / Expressed personal readiness to practice mindfulness skills    Treatment Plan:  Patient has a current master individualized treatment plan.  See Epic treatment plan for more information.    Natalee Eagle Psy., D,  Licensed Clinical Psychologist

## 2023-05-23 NOTE — GROUP NOTE
Psychotherapy Group Note    PATIENT'S NAME: Everette Mayen  MRN:   [id number]  :   1975  ACCT. NUMBER: [id number]  DATE OF SERVICE: 23  START TIME: 10:00 AM  END TIME: 10:50 AM  FACILITATOR: Celsa Arguello LGSW  TOPIC: MH EBP Group: Coping Skills  [facility] Mental Health Day Treatment  TRACK: A2    NUMBER OF PARTICIPANTS: 7    Summary of Group / Topics Discussed:  Coping Skills: Additional Coping Skills:  Patients discussed and practiced TIPP skills (Distress Tolerance).  Reviewed the benefits of applying the aforementioned coping strategies.  Patients explored how these strategies might be applied to daily stressors or distressing situations.    Patient Session Goals / Objectives:    Understand the purpose and benefits of applying TIPP coping strategies    Address barriers to utilizing coping skills when in distress.        Patient Participation / Response:  Fully participated with the group by sharing personal reflections / insights and openly received / provided feedback with other participants.    Demonstrated understanding of topics discussed through group discussion and participation, Expressed understanding of the relevance / importance of coping skills at distressing times in life, Demonstrated knowledge of when to consider using a variety of coping skills in daily life, Identified barriers to applying coping skills, Identified 2-3 positive coping strategies that have helped maintain / improve symptoms in the past and Identified / Expressed personal readiness to practice new coping skills    Treatment Plan:  Patient has a current master individualized treatment plan.  See Epic treatment plan for more information.    NICOLE Linares        Pulmonology

## 2023-05-23 NOTE — GROUP NOTE
"Process Group Note    PATIENT'S NAME: Everette Mayen  MRN:   [id number]  :   1975  ACCT. NUMBER: [id number]  DATE OF SERVICE: 23  START TIME:  9:00 AM  END TIME:  9:50 AM  FACILITATOR: Celsa Arguello LGSW  TOPIC:  Process Group    Diagnoses:  296.89 Bipolar II Disorder Hypomanic  296.32 (F33.1) Major Depressive Disorder, Recurrent Episode, Moderate _ and With melancholic features.  300.02 (F41.1) Generalized Anxiety Disorder.      [facility] Adult Mental Health Day Treatment  TRACK: A2    NUMBER OF PARTICIPANTS: 7          Data:    Session content: At the start of this group, patients were invited to check in by identifying themselves, describing their current emotional status, and identifying issues to address in this group.   Area(s) of treatment focus addressed in this session included Symptom Management, Personal Safety and Community Resources/Discharge Planning.  Reported mood is anxious and \"well-rested\".  Main barrier is decision making.  She noted anxiety about a couple different stressors (e.g. waiting on test results, her aging parents driving) fueling the decision making barriers.  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   Client denied safety concerns, including SI and SIB. Client denies any chemical use.  Client is taking medications as prescribed.  Client reported plans to use the following coping skills: focus, increased self-awareness.  Client is proud of herself for \"protecting\" her sleep.  She reported taking her sleep medication earlier in the night and was able to fall back asleep when she woke up in the middle of the night.  Peers offered supportive feedback and validation.    Therapeutic Interventions/Treatment Strategies:  Psychotherapist offered support, feedback and validation and reinforced use of skills. Treatment modalities used include Cognitive Behavioral Therapy. Interventions include Coping Skills: Discussed " sleep hygiene and mood and Emotions Management:  Discussed barriers to emotional regulation.    Assessment:    Patient response:   Patient responded to session by accepting feedback, giving feedback, listening, focusing on goals, being attentive and accepting support    Possible barriers to participation / learning include: and no barriers identified    Health Issues:   None reported       Substance Use Review:   Substance Use: No active concerns identified.    Mental Status/Behavioral Observations  Appearance:   Appropriate   Eye Contact:   Good   Psychomotor Behavior: Normal   Attitude:   Cooperative  Interested Friendly Pleasant  Orientation:   All  Speech   Rate / Production: Normal    Volume:  Normal   Mood:    Anxious  Well rested  Affect:    Appropriate   Thought Content:   Clear and Safety denies any current safety concerns including suicidal ideation, self-harm, and homicidal ideation  Thought Form:  Coherent  Logical     Insight:    Good     Plan:     Safety Plan: No current safety concerns identified.  Recommended that patient call 911 or go to the local ED should there be a change in any of these risk factors.     Barriers to treatment: None identified    Patient Contracts (see media tab):  None    Substance Use: Not addressed in session     Continue or Discharge: Patient will continue in Adult Day Treatment (ADT)  as planned. Patient is likely to benefit from learning and using skills as they work toward the goals identified in their treatment plan.      NICOLE Linares  May 23, 2023

## 2023-05-24 LAB
GAMMA INTERFERON BACKGROUND BLD IA-ACNC: 0.7 IU/ML
M TB IFN-G BLD-IMP: NEGATIVE
M TB IFN-G CD4+ BCKGRND COR BLD-ACNC: 9.3 IU/ML
MITOGEN IGNF BCKGRD COR BLD-ACNC: -0.05 IU/ML
MITOGEN IGNF BCKGRD COR BLD-ACNC: -0.12 IU/ML
QUANTIFERON MITOGEN: 10 IU/ML
QUANTIFERON NIL TUBE: 0.7 IU/ML
QUANTIFERON TB1 TUBE: 0.65 IU/ML
QUANTIFERON TB2 TUBE: 0.58

## 2023-05-25 ENCOUNTER — HOSPITAL ENCOUNTER (OUTPATIENT)
Dept: BEHAVIORAL HEALTH | Facility: CLINIC | Age: 48
Discharge: HOME | End: 2023-05-25
Attending: PSYCHIATRY & NEUROLOGY
Payer: COMMERCIAL

## 2023-05-25 PROCEDURE — 90853 GROUP PSYCHOTHERAPY: CPT

## 2023-05-25 PROCEDURE — 90853 GROUP PSYCHOTHERAPY: CPT | Performed by: PSYCHOLOGIST

## 2023-05-25 NOTE — GROUP NOTE
"Process Group Note    PATIENT'S NAME: Everette Mayen  MRN:   [id number]  :   1975  ACCT. NUMBER: [id number]  DATE OF SERVICE: 23  START TIME:  9:00 AM  END TIME:  9:50 AM  FACILITATOR: Celsa Arguello LGSW  TOPIC:  Process Group    Diagnoses:  296.89 Bipolar II Disorder Hypomanic  296.32 (F33.1) Major Depressive Disorder, Recurrent Episode, Moderate _ and With melancholic features.  300.02 (F41.1) Generalized Anxiety Disorder.      [facility] Adult Mental Health Day Treatment  TRACK: A2    NUMBER OF PARTICIPANTS: 5          Data:    Session content: At the start of this group, patients were invited to check in by identifying themselves, describing their current emotional status, and identifying issues to address in this group.   Area(s) of treatment focus addressed in this session included Symptom Management, Personal Safety and Community Resources/Discharge Planning.  Reported mood is irritable.  Stressor includes feeling \"unappreciated, undervalued and misunderstood\" at work.  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   Client denied safety concerns, including SI and SIB. Client denies any chemical use.  Client is taking medications as prescribed. Client's goal is manage symptoms. Client reported plans to use the following coping skills: patience, assertive communication.  Writer suggested opening up a blank email to write an uncensored reply as a way to work through anger, which she was receptive to.  Client talked about feeling misunderstood and unseen in areas of her personal life.  She was receptive to feedback related to leaning on supports who make her feel seen and understood.  She reflected on efficacy of program: \"it's working\". Client is proud of positive feedback (\"congratulations\") she received for another one of her projects. Peers offered supportive feedback and validation.    Therapeutic " Interventions/Treatment Strategies:  Psychotherapist offered support, feedback and validation and reinforced use of skills. Treatment modalities used include Cognitive Behavioral Therapy. Interventions include Emotions Management:  Discussed barriers to emotional regulation and Discussed ways of acting on emotions in helpful ways and Relationship Skills: Assisted patients in implementing more effective communication skills in their relationships and Encouraged development and maintenance  of healthy boundaries.    Assessment:    Patient response:   Patient responded to session by accepting feedback, giving feedback, listening, focusing on goals, being attentive and accepting support    Possible barriers to participation / learning include: and no barriers identified    Health Issues:   None reported       Substance Use Review:   Substance Use: No active concerns identified.    Mental Status/Behavioral Observations  Appearance:   Appropriate   Eye Contact:   Good   Psychomotor Behavior: Normal   Attitude:   Cooperative  Interested Friendly Pleasant  Orientation:   All  Speech   Rate / Production: Normal    Volume:  Normal   Mood:    Irritable   Affect:    Appropriate   Thought Content:   Clear and Safety denies any current safety concerns including suicidal ideation, self-harm, and homicidal ideation  Thought Form:  Coherent  Logical     Insight:    Good     Plan:     Safety Plan: No current safety concerns identified.  Recommended that patient call 911 or go to the local ED should there be a change in any of these risk factors.     Barriers to treatment: None identified    Patient Contracts (see media tab):  None    Substance Use: Not addressed in session     Continue or Discharge: Patient will continue in Adult Day Treatment (ADT)  as planned. Patient is likely to benefit from learning and using skills as they work toward the goals identified in their treatment plan.      NICOLE Linares  May 25, 2023

## 2023-05-25 NOTE — GROUP NOTE
Psychotherapy Group Note    PATIENT'S NAME: Everette Mayen  MRN:   [id number]  :   1975  ACCT. NUMBER: [id number]  DATE OF SERVICE: 23  START TIME: 10:00 AM  END TIME: 10:50 AM  FACILITATOR: Celsa Arguello LGSW; Js Johnston PA-C  TOPIC: MH EBP Group: Coping Skills  [facility] Mental Health Day Treatment  TRACK: A2    NUMBER OF PARTICIPANTS: 6    Summary of Group / Topics Discussed:  Coping Skills: Relapse Planning: Patients discussed and increased understanding of how anticipating and planning for possible increased symptoms is a proactive way to reduce the likelihood of relapse.  Patients shared individualized factors that may lead to increased symptoms and decompensation in functioning.  Each patient developed a relapse prevention plan designed to help them recognize when they may have increasing symptoms requiring them to take action and notify their key supports and care team.      Patient Session Goals / Objectives:    Identify and understand what factors may contribute to symptom relapse.    Identify actions that may be taken to manage increased symptoms/stressors.    Create an individualized written relapse plan    Problem solve barriers to plan creation and implementation    Share relapse plan with key support people        Patient Participation / Response:  Fully participated with the group by sharing personal reflections / insights and openly received / provided feedback with other participants.    Demonstrated understanding of topics discussed through group discussion and participation, Expressed understanding of the relevance / importance of coping skills at distressing times in life and Demonstrated knowledge of when to consider using a variety of coping skills in daily life    Treatment Plan:  Patient has a current master individualized treatment plan.  See Epic treatment plan for more information.    NICOLE Linares

## 2023-05-25 NOTE — GROUP NOTE
Psychotherapy Group Note    PATIENT'S NAME: Everette Mayen  MRN:   [id number]  :   1975  ACCT. NUMBER: [id number]  DATE OF SERVICE: 23  START TIME: 11:00 AM  END TIME: 11:50 AM  FACILITATOR: Azeb Emanuel PsyD  TOPIC:  EBP Group: [facility] Adult Mental Health Day Treatment  TRACK: A2    NUMBER OF PARTICIPANTS: 5    Summary of Group / Topics Discussed:  Mindfulness: Mindfulness Experiential: Patients received an overview on what mindfulness is and how mindfulness can benefit general health, mental health symptoms, and stressors. The history of mindfulness, its application to mental health therapies, and key concepts were also discussed. Patients discussed current awareness, knowledge, and practice of mindfulness skills. Patients also discussed barriers to mindfulness practice.    Patient Session Goals / Objectives:    Demonstrated and verbalized understanding of key mindfulness concepts    Identified when/how to use mindfulness skills    Resolved barriers to practicing mindfulness skills    Identified plan to use mindfulness skills in daily life       Patient Participation / Response:  Fully participated with the group by sharing personal reflections / insights and openly received / provided feedback with other participants.    Demonstrated understanding of mindfulness skills and benefits of practice  The group participated in a mindfulness experience of deep breathing while listening to the sound of a creek flowing over rocks.  The group discussed their weekend plans and shared their ideas and concerns with others.  Treatment Plan:  Patient has a current master individualized treatment plan.  See Epic treatment plan for more information.    Natalee Eagle Psy., D,  Licensed Clinical Psychologist

## 2023-05-26 NOTE — ADDENDUM NOTE
Encounter addended by: Celsa Arguello LGSW on: 5/26/2023 5:28 PM   Actions taken: Clinical Note Signed, Delete clinical note

## 2023-05-26 NOTE — PROGRESS NOTES
Acknowledgement of Current Treatment Plan       I have reviewed my treatment plan with my therapist NICOLE Linares on ______________  .   I agree with the plan as it is written in the electronic health record. (A-2 Track)      Name:      Signature:  Everette Winston MD  Psychiatrist/Medical Director      JODY Jarrell [facility] Psychotherapist     JODY Linares LGSW  Psychotherapist

## 2023-05-30 ENCOUNTER — HOSPITAL ENCOUNTER (OUTPATIENT)
Dept: BEHAVIORAL HEALTH | Facility: CLINIC | Age: 48
Discharge: HOME | End: 2023-05-30
Attending: PSYCHIATRY & NEUROLOGY
Payer: COMMERCIAL

## 2023-05-30 PROCEDURE — 90853 GROUP PSYCHOTHERAPY: CPT

## 2023-05-30 PROCEDURE — 90853 GROUP PSYCHOTHERAPY: CPT | Performed by: MARRIAGE & FAMILY THERAPIST

## 2023-05-30 NOTE — GROUP NOTE
Psychotherapy Group Note    PATIENT'S NAME: Everette Mayen  MRN:   [id number]  :   1975  ACCT. NUMBER: [id number]  DATE OF SERVICE: 23  START TIME: 11:00 AM  END TIME: 11:50 AM  FACILITATOR: Sridhar Johnston LMFT  TOPIC: MH EBP Group: Coping Skills  [facility] Adult Mental Health Day Treatment  TRACK: A-2    NUMBER OF PARTICIPANTS: 6    Summary of Group / Topics Discussed:  Coping Skills: Building Positive Experiences: Patients discussed the importance of planning and engaging in positive experiences, as strategies to increase positive thinking, hope, and self-worth.  Explored the benefits of planning / creating positive experiences, including recognizing and reducing negativity bias by focusing on and building positive experiences.   Several approaches to building positive experiences were presented and discussed relevant to each patient.      Patient Session Goals / Objectives:    Understand the purpose of planning / creating / participating / sharing in positive experiences.    Explore patient s experiences related to negative thinking and how it influences activities and moodIdentify current positive events in patient s life.     Set goals to increase a variety of positive experiences.    Address barriers to planning / engaging in positive experiences.        Patient Participation / Response:  Fully participated with the group by sharing personal reflections / insights and openly received / provided feedback with other participants.    Demonstrated understanding of topics discussed through group discussion and participation, Expressed understanding of the relevance / importance of coping skills at distressing times in life, Demonstrated knowledge of when to consider using a variety of coping skills in daily life and Identified / Expressed personal readiness to practice new coping skills    Treatment Plan:  Patient has a current master individualized treatment plan.  See Epic  treatment plan for more information.    MIREYA Ontiveros

## 2023-05-30 NOTE — GROUP NOTE
Psychotherapy Group Note    PATIENT'S NAME: Everette Mayen  MRN:   [id number]  :   1975  ACCT. NUMBER: [id number]  DATE OF SERVICE: 23  START TIME: 10:00 AM  END TIME: 10:50 AM  FACILITATOR: Celsa Arguello LGSW  TOPIC: MH EBP Group: Specialty Awareness  [facility] Adult Mental Health Day Treatment  TRACK: A2    NUMBER OF PARTICIPANTS: 6    Summary of Group / Topics Discussed:  Specialty Topics: Trauma and PTSD: Patients were provided with information to understand the types and origins of trauma, the relationship between trauma and PTSD, the scope of Trauma-Informed Care, and treatment options. Patients were able to explore how trauma may have impacted their functioning directly or indirectly, with reference to the the complexity of trauma and associated treatment options. Patients reviewed their current awareness of this topic and relevance to their functioning. Individual experiences with symptoms and treatment options were discussed.     Patient Session Goals / Objectives:    Discussed definitions of trauma, Trauma-Informed Care, PTSD    Discussed how traumatic experiences affect the individual and their relationships (directly, indirectly, brain development and function)    Identified how a history of trauma or exposure to trauma may impact group work    Assisted patients to find ways to adapt functioning in light of past traumatic experience(s), and to identify suitable treatment options and community resources      Patient Participation / Response:  Fully participated with the group by sharing personal reflections / insights and openly received / provided feedback with other participants.    Demonstrated understanding of topics discussed through group discussion and participation, Identified / Expressed readiness to act on skill suggestions discussed in topic, Verbalized understanding of ways to proactively manage illness and Identified plan to address barriers to practicing skills  discussed in topic    Treatment Plan:  Patient has a current master individualized treatment plan.  See Epic treatment plan for more information.    Celsa Arguello LGSW

## 2023-05-30 NOTE — GROUP NOTE
"Process Group Note    PATIENT'S NAME: Everette Mayen  MRN:   [id number]  :   1975  ACCT. NUMBER: [id number]  DATE OF SERVICE: 23  START TIME:  9:00 AM  END TIME:  9:50 AM  FACILITATOR: Celsa Arguello LGSW  TOPIC:  Process Group    Diagnoses:  296.89 Bipolar II Disorder Hypomanic  296.32 (F33.1) Major Depressive Disorder, Recurrent Episode, Moderate _ and With melancholic features.  300.02 (F41.1) Generalized Anxiety Disorder.      [facility] Adult Mental Health Day Treatment  TRACK: A2    NUMBER OF PARTICIPANTS: 6          Data:    Session content: At the start of this group, patients were invited to check in by identifying themselves, describing their current emotional status, and identifying issues to address in this group.   Area(s) of treatment focus addressed in this session included Symptom Management, Personal Safety and Community Resources/Discharge Planning.  Reported mood is \"angry, lonely, sad, frustrated, grieving, bored, guilty, challenged\".  She reported poor sleep last night.  Client reports passive SI and was able to commit to safety.  Over the weekend she reported \"I wanted to kill myself\". See below list of skills for how she reports keeping safe. Client denies any chemical use.  Client is taking medications as prescribed. Client's goal is to find \"balance and safety\".  Client reported plans to use the following coping skills: sleep hygiene behaviors, body based mindfulness, setting limits and boundaries, affirmations (\"I control my thoughts\") and grounding strategies (\"I remember what was the same before [traumatic event]. That nothing has actually changed\"). Client processed a triggering event over the weekend.  She explained driving past her ex-'s house for the first time since working through the trauma of their divorce/relationship.  Triggers included seeing his new girlfriend's car parked in the driveway.  This new girlfriend happens to be their late child's " "[facility] health nurse.  She talked about grief and forgiveness related to her disabled son dying in her ex-'s care.  She reports a history of her ex- lying to her, even after the divorce.  She reported writing a goodbye letter to her ex- with a message that she will not engage with him until he is honest with her.  She reports difficulty trusting others as a result of this pattern.  Client is proud of herself for setting a boundary despite feeling subsequent guilt. Peers offered supportive feedback and validation.    Writer met with client during the break to complete her 60 day review. See treatment plan for more information.      Therapeutic Interventions/Treatment Strategies:  Psychotherapist offered support, feedback and validation and reinforced use of skills. Treatment modalities used include Cognitive Behavioral Therapy. Interventions include Coping Skills: Promoted understanding of how and when to apply grounding strategies to reduce distress and increase presence in the moment, Other: Assisted patient  in finding ways to adapt functioning in light of past traumatic experiences and Relationship Skills: Encouraged development and maintenance  of healthy boundaries.    Assessment:    Patient response:   Patient responded to session by accepting feedback, giving feedback, listening, focusing on goals, being attentive and accepting support    Possible barriers to participation / learning include: and no barriers identified    Health Issues:   None reported       Substance Use Review:   Substance Use: No active concerns identified.    Mental Status/Behavioral Observations  Appearance:   Appropriate   Eye Contact:   Good   Psychomotor Behavior: Normal   Attitude:   Cooperative  Interested Friendly Pleasant  Orientation:   All  Speech   Rate / Production: Normal    Volume:  Normal   Mood:    \"angry, lonely, sad frustrated, grieving, bored, guilty\".  Affect:    Subdued  Tearful  Thought Content: "   Clear and Safety reports  presence of suicidal ideation passive suicidal ideation   Thought Form:  Coherent  Logical     Insight:    Good     Plan:     Safety Plan: Recommended that patient call 911 or go to the local ED should there be a change in any of these risk factors.    Committed to safety and agreed to follow previously developed safety coping plan.      Barriers to treatment: None identified    Patient Contracts (see media tab):  None    Substance Use: Not addressed in session     Continue or Discharge: Patient will continue in Adult Day Treatment (ADT)  as planned. Patient is likely to benefit from learning and using skills as they work toward the goals identified in their treatment plan.      NICOLE Linares  May 30, 2023

## 2023-05-31 ENCOUNTER — OFFICE VISIT (OUTPATIENT)
Dept: FAMILY MEDICINE | Facility: CLINIC | Age: 48
End: 2023-05-31
Payer: COMMERCIAL

## 2023-05-31 VITALS
HEART RATE: 64 BPM | OXYGEN SATURATION: 93 % | HEIGHT: 62 IN | DIASTOLIC BLOOD PRESSURE: 82 MMHG | TEMPERATURE: 97.7 F | SYSTOLIC BLOOD PRESSURE: 120 MMHG | WEIGHT: 148 LBS | RESPIRATION RATE: 16 BRPM | BODY MASS INDEX: 27.23 KG/M2

## 2023-05-31 DIAGNOSIS — Z12.31 ENCOUNTER FOR SCREENING MAMMOGRAM FOR BREAST CANCER: ICD-10-CM

## 2023-05-31 DIAGNOSIS — Z12.11 SPECIAL SCREENING FOR MALIGNANT NEOPLASMS, COLON: ICD-10-CM

## 2023-05-31 DIAGNOSIS — F31.81 BIPOLAR 2 DISORDER (H): ICD-10-CM

## 2023-05-31 DIAGNOSIS — L40.8 OTHER PSORIASIS: Primary | ICD-10-CM

## 2023-05-31 DIAGNOSIS — I10 BENIGN ESSENTIAL HYPERTENSION: ICD-10-CM

## 2023-05-31 PROCEDURE — 99213 OFFICE O/P EST LOW 20 MIN: CPT | Performed by: INTERNAL MEDICINE

## 2023-05-31 RX ORDER — AMLODIPINE AND BENAZEPRIL HYDROCHLORIDE 5; 10 MG/1; MG/1
1 CAPSULE ORAL DAILY
Qty: 90 CAPSULE | Refills: 3 | Status: SHIPPED | OUTPATIENT
Start: 2023-05-31 | End: 2024-05-24

## 2023-05-31 ASSESSMENT — PAIN SCALES - GENERAL: PAINLEVEL: NO PAIN (0)

## 2023-05-31 NOTE — PROGRESS NOTES
"[facility]  CLINIC PROGRESS NOTE    Subjective:   Establishment of primary care   Everette King Javon Mayen presents to the clinic today for establishment of primary care   Other psoriasis   She has been treating her psoriasis with Humira.  She follows at dermatology specialists.  Benign essential hypertension   Blood pressure has been excellent and doing well with both amlodipine and benazepril.   Bipolar 2 disorder (H)   She has been following with psychiatry and currently doing well with current medications of Latuda and trazodone.  She intends to establish with a new psychiatrist in the next few months.  May need refill until that time.  Special screening for malignant neoplasms, colon   She has been recommended to schedule a colonoscopy  Encounter for screening mammogram for breast cancer   She has been planning to schedule mammogram    Past medical history, medications, allergies, social history, family history reviewed and updated in [facility] as of 5/31/2023 .    ROS  CONSTITUTIONAL: no fatigue, no unexpected change in weight  SKIN: as above   EYES: no acute vision problems or changes  ENT: no ear problems, no mouth problems, no throat problems - allergies  RESP: no significant cough, no shortness of breath  CV: no chest pain, no palpitations, no new or worsening peripheral edema  GI: ? Gastroesophageal reflux with spicy food - rarely  : no frequency, no dysuria, no hematuria - recent urinary tract infection - treated  MS: no claudication, no myalgias, no joint aches  PSYCHIATRIC: as above       Objective:  Vitals  /82 (BP Location: Left arm, Patient Position: Sitting, Cuff Size: Adult Regular)   Pulse 64   Temp 97.7  F (36.5  C) (Temporal)   Resp 16   Ht 1.575 m (5' 2\")   Wt 67.1 kg (148 lb)   SpO2 93%   BMI 27.07 kg/m    GEN: Alert Oriented x3 NAD  HEENT: Atraumatic, normocephalic   TM: TM bilaterally pearly and grey with normal light reflex  CV: RRR no murmurs or rubs  PULM: CTA no wheezes " or crackles  ABD: Soft, nontender nondistended, no hepatosplenomegally  SKIN: No visible skin lesion or ulcerations  EXT: 2+ dorsal pedis pulses, no edema bilateral lower extremities  NEURO: Gait and station deferred, No focal neurologic deficits  PSYCH: Mood good, affect mood congruent    No images are attached to the encounter.    No results found for this or any previous visit (from the past 24 hour(s)).    Assessment/Plan:  Patient Instructions   (L40.8)  PSORIASIS currently on Humira  (primary encounter diagnosis)  Comment: Noted in history.  Continue on Humira.  Labs reviewed per rheumatology with stable renal and liver function tests as well as stable blood counts.  Plan:     (I10) Benign essential hypertension  Comment: Blood pressure is excellent with current dose of amlodipine benazepril  Plan:     (F31.81) Bipolar 2 disorder (H)  Comment: Plan follow-up with psychiatry.  No change in medications  Plan:      Colonoscopy recommended and referral placed    Mammogram also requested -  [facility] (also performs diagnostic mammogram, ultrasound and biopsy) [phone].       Shingrix vaccine is now available.  I would call your insurance to see if a shingles vaccine is covered and get this at your pharmacy     Follow up in 1 year    Disclaimer: This note consists of symbols derived from keyboarding, dictation and/or voice recognition software. As a result, there may be errors in the script that have gone undetected. Please consider this when interpreting information found in this chart.    Marco Nunez MD  [phone]

## 2023-05-31 NOTE — PATIENT INSTRUCTIONS
(L40.8)  PSORIASIS currently on Humira  (primary encounter diagnosis)  Comment: Noted in history.  Continue on Humira.  Labs reviewed per rheumatology with stable renal and liver function tests as well as stable blood counts.  Plan:     (I10) Benign essential hypertension  Comment: Blood pressure is excellent with current dose of amlodipine benazepril  Plan:     (F31.81) Bipolar 2 disorder (H)  Comment: Plan follow-up with psychiatry.  No change in medications  Plan:      Colonoscopy recommended and referral placed    Mammogram also requested -  [facility] (also performs diagnostic mammogram, ultrasound and biopsy) [phone].       Shingrix vaccine is now available.  I would call your insurance to see if a shingles vaccine is covered and get this at your pharmacy

## 2023-06-01 ENCOUNTER — HOSPITAL ENCOUNTER (OUTPATIENT)
Dept: BEHAVIORAL HEALTH | Facility: CLINIC | Age: 48
Discharge: HOME | End: 2023-06-01
Attending: PSYCHIATRY & NEUROLOGY
Payer: COMMERCIAL

## 2023-06-01 PROCEDURE — 90853 GROUP PSYCHOTHERAPY: CPT | Performed by: COUNSELOR

## 2023-06-01 PROCEDURE — 90853 GROUP PSYCHOTHERAPY: CPT

## 2023-06-01 NOTE — GROUP NOTE
Psychotherapy Group Note    PATIENT'S NAME: Everette Mayen  MRN:   [id number]  :   1975  ACCT. NUMBER: [id number]  DATE OF SERVICE: 23  START TIME: 11:00 AM  END TIME: 11:50 AM  FACILITATOR: Alee Cavazos LPCC  TOPIC:  EBP Group: Symptom Awareness  [facility]  TRACK: A2    NUMBER OF PARTICIPANTS: 5    Summary of Group / Topics Discussed:  Symptom Awareness: Mood Disorders: Patients received a general overview of mood disorders including depressive disorders, anxiety disorders, and bipolar disorders and how it relates to their current symptoms. The purpose is to promote understanding of their diagnoses and how it impacts their functioning. Patients reviewed their current awareness of symptoms and diagnoses. Patients received information regarding diagnoses, etiology, cultural, and environmental factors as well as impact on functioning.     Patient Session Goals / Objectives:    Discussed patient individual symptoms and experiences    Reviewed diagnostic criteria and etiology of diagnoses       Patient Participation / Response:  Fully participated with the group by sharing personal reflections / insights and openly received / provided feedback with other participants.    Demonstrated understanding of topics discussed through group discussion and participation, Demonstrated understanding of how information regarding symptoms can assist in management of symptoms and Identified / Expressed personal readiness to increase awareness of symptoms and apply skills as necessary    Treatment Plan:  Patient has a current master individualized treatment plan.  See Epic treatment plan for more information.    FLAKITA Grider

## 2023-06-01 NOTE — GROUP NOTE
"Process Group Note    PATIENT'S NAME: Everette Mayen  MRN:   [id number]  :   1975  ACCT. NUMBER: [id number]  DATE OF SERVICE: 23  START TIME:  9:00 AM  END TIME:  9:50 AM  FACILITATOR: Celsa Arguello LGSW  TOPIC:  Process Group    Diagnoses:  296.89 Bipolar II Disorder Hypomanic  296.32 (F33.1) Major Depressive Disorder, Recurrent Episode, Moderate _ and With melancholic features.  300.02 (F41.1) Generalized Anxiety Disorder.      [facility] Adult Mental Health Day Treatment  TRACK: A2    NUMBER OF PARTICIPANTS: 5      Data:    Session content: At the start of this group, patients were invited to check in by identifying themselves, describing their current emotional status, and identifying issues to address in this group.   Area(s) of treatment focus addressed in this session included Symptom Management, Personal Safety and Community Resources/Discharge Planning.  Reported mood is \"happy, sad, guilty, relieved, proud and anxious\". They noted rapid and catastrophic thinking.   Client reports passive SI, denies SIB. Client denies any chemical use.  Client is taking medications as prescribed. Client's goal is symptom stabilization.  Barrier includes \"lack of trust\".   She reported feeling unstable due to the range of her emotions.  She was surprised after she noticed herself laughing at a recent painful event.  She explained the event including a man she has been casually seeing for the past 2.5 years.  Apparently he sent her a text shortly after meeting with his ex-wife and ex-wife's new . She felt increased insight into his motivation for texting her (I.e. jealousy) and saw this as a sign of progress (\"I am seeing things clearer\"). She would still like to work on adjusting her negative core beliefs (e.g. what's wrong with me that I am attracting these types of guys?\").  Client reported plans to use the following coping skills: body based mindfulness, recognizing progress. She expressed " gratitude to peers for their support this week. Peers offered supportive feedback and validation.    Therapeutic Interventions/Treatment Strategies:  Psychotherapist offered support, feedback and validation and reinforced use of skills. Treatment modalities used include Cognitive Behavioral Therapy. Interventions include Cognitive Restructuring:  Facilitated recognition of the connection between negative thoughts and negative core beliefs and Assisted patient in identifying new neutral/positive core beliefs and Relationship Skills: Assisted patients in implementing more effective communication skills in their relationships, Encouraged development and maintenance  of healthy boundaries and Discussed strategies to promote healthier understanding of interpersonal relationships.    Assessment:    Patient response:   Patient responded to session by accepting feedback, giving feedback, listening, focusing on goals, being attentive and accepting support    Possible barriers to participation / learning include: and no barriers identified    Health Issues:   None reported       Substance Use Review:   Substance Use: No active concerns identified.    Mental Status/Behavioral Observations  Appearance:   Appropriate   Eye Contact:   Good   Psychomotor Behavior: Normal   Attitude:   Cooperative  Interested Friendly Pleasant  Orientation:   All  Speech   Rate / Production: Normal    Volume:  Normal   Mood:    Anxious  Sad  Happy, proud, guilty, relieved  Affect:    Appropriate   Thought Content:   Clear and Safety reports  presence of suicidal ideation passive suicidal ideation   Thought Form:  Coherent  Logical     Insight:    Good     Plan:     Safety Plan: Recommended that patient call 911 or go to the local ED should there be a change in any of these risk factors.    Committed to safety and agreed to follow previously developed safety coping plan.      Barriers to treatment: None identified    Patient Contracts (see media  tab):  None    Substance Use: Not addressed in session     Continue or Discharge: Patient will continue in Adult Day Treatment (ADT)  as planned. Patient is likely to benefit from learning and using skills as they work toward the goals identified in their treatment plan.      NICOLE Linares  June 1, 2023

## 2023-06-01 NOTE — GROUP NOTE
Psychotherapy Group Note    PATIENT'S NAME: Everette Mayen  MRN:   [id number]  :   1975  ACCT. NUMBER: [id number]  DATE OF SERVICE: 23  START TIME: 10:00 AM  END TIME: 10:50 AM  FACILITATOR: Celsa Arguello LGSW  TOPIC: MH EBP Group: Coping Skills  [facility] Mental Health Day Treatment  TRACK: A2    NUMBER OF PARTICIPANTS: 5    Summary of Group / Topics Discussed:  Coping Skills: Radical Acceptance: Patients learned radical acceptance as a way to tolerate heightened stress in the moment.  Patients identified situations that necessitate radical acceptance.  They focused on applying acceptance of the moment to tolerate difficult emotions and events. Patients discussed how to distinguish when this can be useful in their lives and when other skills are more relevant or helpful.    Patient Session Goals / Objectives:    Understand that some amount of pain exists for each of us in our lives    Process the difficulty of acceptance in our lives and benefits of radical acceptance to mood and functioning.    Demonstrate understanding of when to use the radical acceptance to tolerate distress by providing examples of situations where this could be applied.    Identify barriers to applying radical acceptance to difficult situations and explore strategies to overcome them        Patient Participation / Response:  Fully participated with the group by sharing personal reflections / insights and openly received / provided feedback with other participants.    Demonstrated understanding of topics discussed through group discussion and participation, Expressed understanding of the relevance / importance of coping skills at distressing times in life, Demonstrated knowledge of when to consider using a variety of coping skills in daily life, Identified barriers to applying coping skills and Identified / Expressed personal readiness to practice new coping skills    Treatment Plan:  Patient has a current master  individualized treatment plan.  See Epic treatment plan for more information.    NICOLE Linares

## 2023-06-05 ENCOUNTER — HOSPITAL ENCOUNTER (OUTPATIENT)
Dept: BEHAVIORAL HEALTH | Facility: CLINIC | Age: 48
Discharge: HOME | End: 2023-06-05
Attending: PSYCHIATRY & NEUROLOGY
Payer: COMMERCIAL

## 2023-06-05 PROCEDURE — 90853 GROUP PSYCHOTHERAPY: CPT | Performed by: MARRIAGE & FAMILY THERAPIST

## 2023-06-05 PROCEDURE — 90853 GROUP PSYCHOTHERAPY: CPT

## 2023-06-05 NOTE — GROUP NOTE
"Process Group Note    PATIENT'S NAME: Everette Mayen  MRN:   [id number]  :   1975  ACCT. NUMBER: [id number]  DATE OF SERVICE: 23  START TIME:  9:00 AM  END TIME:  9:50 AM  FACILITATOR: Celsa Arguello LGSW  TOPIC:  Process Group    Diagnoses:  296.89 Bipolar II Disorder Hypomanic  296.32 (F33.1) Major Depressive Disorder, Recurrent Episode, Moderate _ and With melancholic features.  300.02 (F41.1) Generalized Anxiety Disorder.      [facility] Adult Mental Health Day Treatment  TRACK: A2    NUMBER OF PARTICIPANTS: 6          Data:    Session content: At the start of this group, patients were invited to check in by identifying themselves, describing their current emotional status, and identifying issues to address in this group.   Area(s) of treatment focus addressed in this session included Symptom Management, Personal Safety and Community Resources/Discharge Planning.  Reported mood is \"calm, hopeful, self-reliant\".  Client denied safety concerns, including SI and SIB. Client reports having one beer this weekend.  Client is taking medications as prescribed. Client's goal is to not contact her most recent ex-partner.  Client reported plans to use the following coping skills: patience, reasoning, distraction. Barrier includes impulsiveness.  Client is proud of \"having gotten as far as I have\". Peers offered supportive feedback and validation.    Therapeutic Interventions/Treatment Strategies:  Psychotherapist offered support, feedback and validation and reinforced use of skills. Treatment modalities used include Cognitive Behavioral Therapy. Interventions include Coping Skills: Assisted patient in identifying 1-2 healthy distraction skills to reduce overall distress and Discussed how the use of intentional \"in the moment\" actions can help reduce distress and Relationship Skills: Assisted patients in implementing more effective communication skills in their relationships and Encouraged " development and maintenance  of healthy boundaries.    Assessment:    Patient response:   Patient responded to session by accepting feedback, giving feedback, listening, focusing on goals, being attentive and accepting support    Possible barriers to participation / learning include: and no barriers identified    Health Issues:   None reported       Substance Use Review:   Substance Use: No active concerns identified.    Mental Status/Behavioral Observations  Appearance:   Appropriate   Eye Contact:   Good   Psychomotor Behavior: Normal   Attitude:   Cooperative  Interested Friendly Pleasant  Orientation:   All  Speech   Rate / Production: Normal    Volume:  Normal   Mood:    Normal Calm hopeful self-reliant  Affect:    Appropriate   Thought Content:   Clear and Safety denies any current safety concerns including suicidal ideation, self-harm, and homicidal ideation  Thought Form:  Coherent  Logical     Insight:    Good     Plan:     Safety Plan: No current safety concerns identified.  Recommended that patient call 911 or go to the local ED should there be a change in any of these risk factors.     Barriers to treatment: None identified    Patient Contracts (see media tab):  None    Substance Use: Not addressed in session     Continue or Discharge: Patient will continue in Adult Day Treatment (ADT)  as planned. Patient is likely to benefit from learning and using skills as they work toward the goals identified in their treatment plan.      NICOLE Linares  June 5, 2023

## 2023-06-05 NOTE — GROUP NOTE
Psychotherapy Group Note    PATIENT'S NAME: Everette Mayen  MRN:   [id number]  :   1975  ACCT. NUMBER: [id number]  DATE OF SERVICE: 23  START TIME: 10:00 AM  END TIME: 10:50 AM  FACILITATOR: Celsa Arguello LGSW  TOPIC: MH EBP Group: Coping Skills  [facility] Adult Mental Health Day Treatment  TRACK: A2    NUMBER OF PARTICIPANTS: 6    Summary of Group / Topics Discussed:  Coping Skills: Additional Coping Skills:  Patients discussed and practiced humor.  Reviewed the benefits of applying the aforementioned coping strategies.  Patients explored how these strategies might be applied to daily stressors or distressing situations.    Patient Session Goals / Objectives:    Understand the purpose and benefits of cultivating humor    Discussed tangible ways to increase humor and laughter.    Address barriers to utilizing coping skills when in distress.        Patient Participation / Response:  Fully participated with the group by sharing personal reflections / insights and openly received / provided feedback with other participants.    Demonstrated understanding of topics discussed through group discussion and participation, Expressed understanding of the relevance / importance of coping skills at distressing times in life, Demonstrated knowledge of when to consider using a variety of coping skills in daily life, Identified barriers to applying coping skills, Identified 2-3 positive coping strategies that have helped maintain / improve symptoms in the past and Identified / Expressed personal readiness to practice new coping skills    Treatment Plan:  Patient has a current master individualized treatment plan.  See Epic treatment plan for more information.    NICOLE Linares

## 2023-06-05 NOTE — GROUP NOTE
Psychotherapy Group Note    PATIENT'S NAME: Everette Mayen  MRN:   [id number]  :   1975  ACCT. NUMBER: [id number]  DATE OF SERVICE: 23  START TIME: 11:00 AM  END TIME: 11:50 AM  FACILITATOR: Sridhar Johnston LMFT  TOPIC: MH EBP Group: Cognitive Restructuring  [facility] Mental Health Day Treatment  TRACK: A2    NUMBER OF PARTICIPANTS: 6    Summary of Group / Topics Discussed:  Cognitive Restructuring: Decision-Making. Discussed difficult decisions and how to work through them using various specific approaches and skills. Pete on DBT, CBT and ACT approaches to working with challenging situations that call for action.               Patient Participation / Response:  Fully participated with the group by sharing personal reflections / insights and openly received / provided feedback with other participants.    Demonstrated understanding of topics discussed through group discussion and participation, Expressed understanding of the relationship between behaviors, thoughts, and feelings and Demonstrated knowledge of personal thought patterns and how they impact their mood and behavior.    Treatment Plan:  Patient has a current master individualized treatment plan.  See Epic treatment plan for more information.    MIREYA Ontiveros

## 2023-06-06 ENCOUNTER — HOSPITAL ENCOUNTER (OUTPATIENT)
Dept: BEHAVIORAL HEALTH | Facility: CLINIC | Age: 48
Discharge: HOME | End: 2023-06-06
Attending: PSYCHIATRY & NEUROLOGY
Payer: COMMERCIAL

## 2023-06-06 PROCEDURE — 90853 GROUP PSYCHOTHERAPY: CPT | Performed by: PSYCHOLOGIST

## 2023-06-06 PROCEDURE — 90853 GROUP PSYCHOTHERAPY: CPT

## 2023-06-06 NOTE — GROUP NOTE
Psychotherapy Group Note    PATIENT'S NAME: Everette Mayen  MRN:   [id number]  :   1975  ACCT. NUMBER: [id number]  DATE OF SERVICE: 23  START TIME: 11:00 AM  END TIME: 11:50 AM  FACILITATOR: Azeb Emanuel PsyD  TOPIC:  EBP Group: Self-Awareness  [facility] Mental Health Day Treatment  TRACK: A2    NUMBER OF PARTICIPANTS: 5    Summary of Group / Topics Discussed:  Self-Awareness: Gratitude: Topic focused on assisting patients in identifying key concepts in gratitude. Patients discussed the benefits of practicing gratitude and its impact on mood improvement, mindfulness, and perspective. Patients worked to increase time spent on recognition and appreciation of what is positive and working in their lives. Patients discussed the concepts and benefits of feeling grateful. The goal is to reduce rumination and negative thinking resulting in increased mindfulness and resilience. Patients specifically discussed how they can practice and problem solve barriers to daily gratitude practice.     Patient Session Goals / Objectives:    Burr the concept and benefits of gratitude    Identified ways to practice gratitude in daily life    Problem solved barriers to practicing gratitude      Patient Participation / Response:  Fully participated with the group by sharing personal reflections / insights and openly received / provided feedback with other participants.    Demonstrated understanding of values, strengths, and challenges to learn about themselves  The group participated in a structured activity that emphasized gratitude and the GLAD technique. The group also discussed how they noticed gratitude in their everyday lives, did their self-cares, interacted with others, and had relationships with family and friends. The group shared their interactions.  Treatment Plan:  Patient has a current master individualized treatment plan.  See Epic treatment plan for more information.    Azeb Emanuel,  Jesica Freeman., D,  Licensed Clinical Psychologist

## 2023-06-06 NOTE — GROUP NOTE
Psychotherapy Group Note    PATIENT'S NAME: Everette Mayen  MRN:   [id number]  :   1975  ACCT. NUMBER: [id number]  DATE OF SERVICE: 23  START TIME: 10:00 AM  END TIME: 10:50 AM  FACILITATOR: Celsa Arguello LGSW  TOPIC: MH EBP Group: Coping Skills  [facility] Adult Mental Health Day Treatment  TRACK: A2    NUMBER OF PARTICIPANTS: 5    Summary of Group / Topics Discussed:  Coping Skills: Grounding: Patients discussed and practiced strategies to increase attachment / presence to the current moment.  Patients identified situations in which using these strategies will help improve emotion regulation sense of calm in the body.  Reviewed the benefits of applying grounding strategies, as well as past / current practices of each member.  Patients identified situations in which using these strategies would reduce stress. They developed the ability to distinguish when these strategies can be useful in their lives for management and stress and psychological well-being.    Patient Session Goals / Objectives:    Understand the purpose of using grounding strategies to reduce stress.    Verbalize understanding of how and when to apply grounding strategies to reduce distress and increase presence in the moment.    Review patients current grounding practices and discuss a more formal way of practicing and accessing skills.    Practice using various calming strategies (e.g. 5-4-3-2-1; mental and body awareness).    Choose 1-2 grounding strategies to apply during times of distress.        Patient Participation / Response:  Fully participated with the group by sharing personal reflections / insights and openly received / provided feedback with other participants.    Demonstrated understanding of topics discussed through group discussion and participation, Expressed understanding of the relevance / importance of coping skills at distressing times in life, Demonstrated knowledge of when to consider using a variety  of coping skills in daily life, Identified barriers to applying coping skills, Identified 2-3 positive coping strategies that have helped maintain / improve symptoms in the past, Practiced 2-3 new coping skills in session and Identified / Expressed personal readiness to practice new coping skills    Treatment Plan:  Patient has a current master individualized treatment plan.  See Epic treatment plan for more information.    Celsa Arguello LGSW

## 2023-06-06 NOTE — GROUP NOTE
"Process Group Note    PATIENT'S NAME: Everette Mayen  MRN:   [id number]  :   1975  ACCT. NUMBER: [id number]  DATE OF SERVICE: 23  START TIME:  9:00 AM  END TIME:  9:50 AM  FACILITATOR: Celsa Arguello LGSW  TOPIC:  Process Group    Diagnoses:  296.89 Bipolar II Disorder Hypomanic  296.32 (F33.1) Major Depressive Disorder, Recurrent Episode, Moderate _ and With melancholic features.  300.02 (F41.1) Generalized Anxiety Disorder.      [facility] Adult Mental Health Day Treatment  TRACK: A2    NUMBER OF PARTICIPANTS: 5          Data:    Session content: At the start of this group, patients were invited to check in by identifying themselves, describing their current emotional status, and identifying issues to address in this group.   Area(s) of treatment focus addressed in this session included Symptom Management, Personal Safety and Community Resources/Discharge Planning.  Reported mood is \"calm and tired\".   Client denied safety concerns, including SI and SIB. Client denies any chemical use.  Client is taking medications as prescribed. Client's goal is to focus on her own work tasks and avoiding her \"caretaking and codependent\" behaviors. Client is proud of herself for following through on her goal of not contacting her ex-partner yesterday on his birthday.  She briefly processed progress with core beliefs about herself in relationships: \"I know I am not a slut\".  She would like to explore other ways to affirm herself (\"what else am I not?\") and gave the example \"I am not a distraction\". Peers offered supportive feedback and validation.    Therapeutic Interventions/Treatment Strategies:  Psychotherapist offered support, feedback and validation and reinforced use of skills. Treatment modalities used include Cognitive Behavioral Therapy. Interventions include Cognitive Restructuring:  Facilitated recognition of the connection between negative thoughts and negative core beliefs and Assisted patient " in identifying new neutral/positive core beliefs and Relationship Skills: Encouraged development and maintenance  of healthy boundaries.    Assessment:    Patient response:   Patient responded to session by accepting feedback, giving feedback, listening, focusing on goals, being attentive and accepting support    Possible barriers to participation / learning include: and no barriers identified    Health Issues:   None reported       Substance Use Review:   Substance Use: No active concerns identified.    Mental Status/Behavioral Observations  Appearance:   Appropriate   Eye Contact:   Good   Psychomotor Behavior: Normal   Attitude:   Cooperative  Interested Friendly Pleasant  Orientation:   All  Speech   Rate / Production: Normal    Volume:  Normal   Mood:    Calm, tired  Affect:    Appropriate   Thought Content:   Clear and Safety denies any current safety concerns including suicidal ideation, self-harm, and homicidal ideation  Thought Form:  Coherent  Logical     Insight:    Good     Plan:     Safety Plan: No current safety concerns identified.  Recommended that patient call 911 or go to the local ED should there be a change in any of these risk factors.     Barriers to treatment: None identified    Patient Contracts (see media tab):  None    Substance Use: Not addressed in session     Continue or Discharge: Patient will continue in Adult Day Treatment (ADT)  as planned. Patient is likely to benefit from learning and using skills as they work toward the goals identified in their treatment plan.      Celsa Arguello, NICOLE  June 6, 2023

## 2023-06-08 ENCOUNTER — HOSPITAL ENCOUNTER (OUTPATIENT)
Dept: BEHAVIORAL HEALTH | Facility: CLINIC | Age: 48
Discharge: HOME | End: 2023-06-08
Attending: PSYCHIATRY & NEUROLOGY
Payer: COMMERCIAL

## 2023-06-08 PROCEDURE — 90853 GROUP PSYCHOTHERAPY: CPT | Performed by: MARRIAGE & FAMILY THERAPIST

## 2023-06-08 PROCEDURE — 90853 GROUP PSYCHOTHERAPY: CPT | Performed by: PSYCHOLOGIST

## 2023-06-08 NOTE — GROUP NOTE
Psychotherapy Group Note    PATIENT'S NAME: Everette Mayen  MRN:   [id number]  :   1975  ACCT. NUMBER: [id number]  DATE OF SERVICE: 23  START TIME: 11:00 AM  END TIME: 11:50 AM  FACILITATOR: Azeb Emanuel PsyD  TOPIC:  EBP Group: Behavioral Activation  [facility] Adult Mental Health Day Treatment  TRACK: A2    NUMBER OF PARTICIPANTS: 6    Summary of Group / Topics Discussed:  Behavioral Activation: Activity Scheduling:Patients explored how they currently spend their time, and how specific behaviors impact thoughts and feelings.  The group explored the effect of negative and positive activities on mood states and thought patterns.  Patients identified activities that help to improve mood and thinking patterns, and developed a plan to implement positive activities between sessions.      Patient Session Goals / Objectives:    Identify impact of current behaviors on thoughts and mood    Identify 2-3 behavioral changes that could have a positive impact on thoughts and mood    Prepare to make desired behavioral change: Create a change plan / activity schedule      Patient Participation / Response:  Fully participated with the group by sharing personal reflections / insights and openly received / provided feedback with other participants.    Expressed understanding of the relationship between behaviors, thoughts, and feelings  The group participated in an activity that examined different areas of their routines to maintain balance for the weekend and their plans. The group discussed sleep, medications, exercise, moods, and barriers to their activities  Treatment Plan:  Patient has a current master individualized treatment plan.  See Epic treatment plan for more information.    Natalee Eagle Psy., D,  Licensed Clinical Psychologist

## 2023-06-08 NOTE — GROUP NOTE
Psychotherapy Group Note    PATIENT'S NAME: Everette Mayen  MRN:   [id number]  :   1975  ACCT. NUMBER: [id number]  DATE OF SERVICE: 23  START TIME: 10:00 AM  END TIME: 10:50 AM  FACILITATOR: Sridhar Johnston LMFT  TOPIC:  EBP Group: Coping Skills  [facility] Adult Mental Health Day Treatment  TRACK: A-2    NUMBER OF PARTICIPANTS: 7    Summary of Group / Topics Discussed:  Coping Skills: Meditation: Patients learned about meditation and explored how and when to utilize it to increase focus, reduce mental health symptoms, decrease physical tension, and improve mental well-being.  Approaches to meditation were presented as a means of increasing self-awareness. The benefits of various meditation practices were discussed, as well as barriers to utilization of this coping strategy.     Patient Session Goals / Objectives:    Understand the purpose and efficacy of using meditation modalities to reduce stress / symptoms.    Review / discuss situations in daily life that cause distress, where establishing a meditation routine or meditating as needed may improve functioning.      Verbalize understanding of how and when to apply grounding strategies to reduce distress and increase presence in the moment.    Practice meditation and address barriers to use in daily life.        Patient Participation / Response:  Fully participated with the group by sharing personal reflections / insights and openly received / provided feedback with other participants.    Demonstrated understanding of topics discussed through group discussion and participation, Expressed understanding of the relevance / importance of coping skills at distressing times in life, Demonstrated knowledge of when to consider using a variety of coping skills in daily life and Identified barriers to applying coping skills    Treatment Plan:  Patient has a current master individualized treatment plan.  See Epic treatment plan for more  information.    Sridhar Johnston LMFT

## 2023-06-08 NOTE — GROUP NOTE
Process Group Note    PATIENT'S NAME: Everette Mayen  MRN:   [id number]  :   1975  ACCT. NUMBER: [id number]  DATE OF SERVICE: 23  START TIME:  9:00 AM  END TIME:  9:50 AM  FACILITATOR: Sridhar Johnston LMFT  TOPIC:  Process Group    Diagnoses:  296.89 Bipolar II Disorder Hypomanic  296.32 (F33.1) Major Depressive Disorder, Recurrent Episode, Moderate _ and With melancholic features.  300.02 (F41.1) Generalized Anxiety Disorder.    [facility] Adult Mental Health Day Treatment  TRACK: A-2    NUMBER OF PARTICIPANTS: 7      Data:    Session content: At the start of this group, patients were invited to check in by identifying themselves, describing their current emotional status, and identifying issues to address in this group.   Area(s) of treatment focus addressed in this session included Symptom Management, Personal Safety and Community Resources/Discharge Planning.    Patient reported feeling guilty and still optimistic. They have been working on holding important boundaires in relationships. Patient discussed working toward keeping these boundaries in place. Patient identified patience and goal-setting as skills they will use to address their goal(s). Patient reported procrastination may be a barrier to working toward their goal(s) and/or addressing mental health symptoms. Patient reported no safety concerns and/or self-injurious behaviors. Patient reported no substance use. Patient reported they are taking their medications as prescribed. Patient reported feeling grateful for the group today. Patient discussed her upcoming birthday party and turning 48 with the treatment group.     Therapeutic Interventions/Treatment Strategies:  Psychotherapist offered support, feedback and validation and reinforced use of skills. Treatment modalities used include Motivational Interviewing and Cognitive Behavioral Therapy. Interventions include Behavioral Activation: Reinforced benefits/challenges of  change process through applying skills to replace unwanted behaviors and Encouraged strategies to reduce individual procrastination and increase motivation by increasing goal-directed activities to enhance mood and reduce symptoms., Cognitive Restructuring:  Assisted patient in formulating new neutral/positive alternatives to challenge less helpful / ineffective thoughts and Relationship Skills: Encouraged development and maintenance  of healthy boundaries and Discussed strategies to promote healthier understanding of interpersonal relationships.    Assessment:    Patient response:   Patient responded to session by accepting feedback, giving feedback, listening, focusing on goals, being attentive and accepting support    Possible barriers to participation / learning include: and no barriers identified    Health Issues:   None reported       Substance Use Review:   Substance Use: No active concerns identified.    Mental Status/Behavioral Observations  Appearance:   Appropriate   Eye Contact:   Good   Psychomotor Behavior: Normal   Attitude:   Cooperative   Orientation:   All  Speech   Rate / Production: Normal    Volume:  Normal   Mood:    Anxious   Affect:    Appropriate   Thought Content:   Clear  Thought Form:  Coherent  Logical     Insight:    Good     Plan:     Safety Plan: No current safety concerns identified.  Recommended that patient call 911 or go to the local ED should there be a change in any of these risk factors.     Barriers to treatment: None identified    Patient Contracts (see media tab):  None    Substance Use: Not addressed in session     Continue or Discharge: Patient will continue in Adult Day Treatment (ADT)  as planned. Patient is likely to benefit from learning and using skills as they work toward the goals identified in their treatment plan.      Sridhar Johnston, MIREYA  June 8, 2023

## 2023-06-19 ENCOUNTER — HOSPITAL ENCOUNTER (OUTPATIENT)
Dept: BEHAVIORAL HEALTH | Facility: CLINIC | Age: 48
Discharge: HOME | End: 2023-06-19
Attending: PSYCHIATRY & NEUROLOGY
Payer: COMMERCIAL

## 2023-06-19 VITALS
DIASTOLIC BLOOD PRESSURE: 78 MMHG | HEART RATE: 67 BPM | SYSTOLIC BLOOD PRESSURE: 127 MMHG | RESPIRATION RATE: 16 BRPM | OXYGEN SATURATION: 98 % | TEMPERATURE: 97.8 F

## 2023-06-19 DIAGNOSIS — F43.10 POSTTRAUMATIC STRESS DISORDER: ICD-10-CM

## 2023-06-19 DIAGNOSIS — F31.81 BIPOLAR 2 DISORDER (H): Primary | ICD-10-CM

## 2023-06-19 PROCEDURE — 90853 GROUP PSYCHOTHERAPY: CPT | Performed by: PSYCHOLOGIST

## 2023-06-19 PROCEDURE — 90853 GROUP PSYCHOTHERAPY: CPT

## 2023-06-19 PROCEDURE — 99214 OFFICE O/P EST MOD 30 MIN: CPT | Performed by: PSYCHIATRY & NEUROLOGY

## 2023-06-19 NOTE — PROGRESS NOTES
"[facility]   Adult Mental Health Outpatient Programs  Provider Interval History Note    Program: Combined Intensive Outpatient/Adult Day Treatment Program (track A2)    PATIENT'S NAME: Everette Mayen  MRN:   [id number]  :   1975  ACCT. NUMBER: [id number]  DATE OF SERVICE: 23    Interval History:  \"I haven't made any progress on getting on a waitlist.\" Mable presents today for follow-up and ongoing program supervision.   Endorses:    \"I don't feel like I'm in a crisis\"    \"This [group/program] has been working, the medication has been working\"    Recently was attending a conference for a week and not in therapy during that time.\"It was fine\"    \"I have had consuming thoughts that I could manage, [was] able to employ skills\"  o Such thoughts were, \"guilt-related,\" specifically to divorce, conflict with ex-    \"I do not like the fact that I've gained 5 lbs since April\"  o Does not relate to medication  o \"Food has always been a reward for me\"  o Exercising less (previously was exercising up to 3 hours a day)  o Endorses binge-eating since age 6  - \"Weight is an issue for me, out-of-control eating is an issue for me\"    Symptoms/Systems:    Sleep: Improving, no issues    Appetite: See above    Daily function/ADLs: Denies concerns    Hygiene: Denies concerns    Socialization: Denies concerns    Concerns about work or ability to work: Still working    Substance use:    None endorsed    Safety Assessment:    Suicidal ideation: denies current or recent suicidal ideation or behavior    Thoughts of non-suicidal self-injury: denied    Recent self-injurious behavior: denied    Homicidal ideation: denied    Other safety concerns: denied    Medications:  Current Outpatient Medications   Medication Sig Dispense Refill     lurasidone (LATUDA) 20 MG TABS tablet Take 1 tablet (20 mg) by mouth daily with food 30 tablet 3     traZODone (DESYREL) 50 MG tablet Take 1 tablet (50 " mg) by mouth At Bedtime 30 tablet 3     adalimumab (HUMIRA) 40 MG/0.8ML prefilled syringe kit Inject 0.8 mLs (40 mg) Subcutaneous every 14 days 2 each 5     amLODIPine-benazepril (LOTREL) 5-10 MG capsule Take 1 capsule by mouth daily 90 capsule 3     clobetasol (TEMOVATE) 0.05 % external ointment Apply topically 2 times daily as needed (Psoriasis) 60 g 11       The above list was reviewed and updated in [facility] with patient today.     Patient is taking medications as prescribed and denies adverse effects    Laboratory Results:  Most recent labs reviewed. Pertinent updates/findings: None.     Metrics:  PHQ-9 scores:       10/27/2020     3:19 PM 3/15/2023     1:39 PM 3/29/2023     9:59 AM 4/21/2023     9:02 AM   PHQ-9 SCORE   PHQ-9 Total Score MyChart   17 (Moderately severe depression) 9 (Mild depression)   PHQ-9 Total Score 10 17 17 9       AVNI-7 scores:       3/15/2023     1:39 PM 3/29/2023    10:01 AM 4/21/2023     9:03 AM   AVNI-7 SCORE   Total Score  16 (severe anxiety) 8 (mild anxiety)   Total Score 16 16 8    8    8    8       CSSR-S: [place] Suicide Severity Rating Scale (Short Version)      10/20/2020     8:10 PM 3/27/2023    12:47 PM 3/29/2023    10:00 AM   [place] Suicide Severity Rating (Short Version)   Over the past 2 weeks have you felt down, depressed, or hopeless? no yes    Over the past 2 weeks have you had thoughts of killing yourself? no yes    Have you ever attempted to kill yourself? no no    Q1 Wished to be Dead (Past Month)  yes yes   Q2 Suicidal Thoughts (Past Month)  yes yes   Q3 Suicidal Thought Method  no no   Q4 Suicidal Intent without Specific Plan  no no   Q5 Suicide Intent with Specific Plan  no no   Q6 Suicide Behavior (Lifetime)  no no   Level of Risk per Screen  low risk low risk   High Risk Required Interventions  On continuous in person observation    Required Interventions  Patient searched;Belongings removed    Interventions  DEC consulted;Monitored via video          Mental  "Status Examination:  Vital Signs: /78   Pulse 67   Temp 97.8  F (36.6  C) (Skin)   Resp 16   SpO2 98%    Appearance: appropriately groomed, appears stated age, and in no apparent distress.  Attitude: cooperative   Eye Contact: good   Muscle Strength and Tone: no gross abnormalities   Psychomotor Behavior: normal or unremarkable   Gait and Station: deferred  Speech: normal rate, production, volume, and rhythm of  Associations: No loosening of associations  Thought Process: coherent and goal directed  Thought Content: no evidence of suicidal ideation or homicidal ideation, no evidence of psychotic thought, no auditory hallucinations present and no visual hallucinations present  Mood: \"better\"  Affect: mood congruent, intensity is normal, full range and reactive  Insight: good  Judgment: intact, adequate for safety  Impulse Control: intact  Oriented to: time, place, person and situation  Attention Span and Concentration: normal  Language: Intact  Recent and Remote Memory: intact to interview. Not formally assessed. No amnesia.  Fund of Knowledge/Assessment of Intelligence: Above average  Capacity of Activities of Daily Living: Independent, able to participate in programmatic care services.    Diagnosis/es:    ICD-10-CM    1. Bipolar 2 disorder (H)  F31.81 Adult Mental Health  Referral      2. Posttraumatic stress disorder  F43.10 Adult Mental Health  Referral        Have not ruled out:  300.02 (F41.1) Generalized Anxiety Disorder  309.81 (F43.10) Posttraumatic Stress Disorder (includes Posttraumatic Stress Disorder for Children 6 Years and Younger)  Without dissociative symptoms    Assessment/Plan:  Mable presents today for follow up. Endorses continued improvement as a result of current medications at current doses and continued time spent in the program. Discussed that there is room to increase both trazodone and lurasidone if needed in the future, but there is no indication to do so now. " Refills are in place for both medications; no new prescriptions today.    Patient is actively engaged in the therapeutic process and is currently exploring the role of trauma and eating and relationships. Patient is tentatively scheduled to complete our program in approximately 10 days and may be switching individual therapists in the near future. We will continue in the program as scheduled for consolidation of treatment gains and to allow for a smooth transition to outpatient care. Patient has contact information for outpatient psychiatry, but given stability of medications can be followed by primary care in the meantime.      Bipolar disorder  o Overall improved   o Stable  o No change to medications today  o Continue IOP per above      Trauma, rule out PTSD  o Overall improved  o Will need continued psychotherapy and group for the time being and individually moving forward as noted above  o Plan per above      Anxiety  o Overall improved  o See above    Continue all other medications as reviewed per electronic medical record today    Continue therapy as planned:    Enrolled in intensive outpatient    Continues to benefit from services    Continues to meet criteria for this level of care    Patient would be at reasonable risk of requiring a higher level of care in the absence of current services.    I feel this patient does not meet criteria for an involuntary hold and is appropriate for treatment at an outpatient level of care.    Continue with individual therapist as appropriate    Safety plan reviewed:    To the Emergency Department as needed or call after hours crisis line at [phone] or [phone]. [place] Crisis Text Line: Text [place] to 449780 or Suicide LifeLine Chat: suicidepreventionlifeline.org/chat    Follow-up:     schedule an appointment with me or another program provider as needed.  Can speak with a staff member or call the appropriate program number (see below) to schedule    Follow up with  outpatient provider(s) as planned or sooner if needed for acute medical concerns.    Questions or concerns:    Call program line with questions or concerns (see below)    [employer] may be used to communicate with your provider, but this is not intended to be used for emergencies.    [facility] Adult Mental Health Program lines:  [facility]: [phone]  Dual Disorder: [phone]  Adult Day Treatment:  [phone]  55+/Intensive Outpatient: [phone]    Community Resources:    National Suicide Prevention Lifeline: 988 from any phone, or 126-135-9564 (TTY: 603.485.5413). Call anytime for help.  (www.suicidepreventionlifeline.org)  National [place] on Mental Illness (www.tiny.org): 167.381.1446 or 562-959-2012.   Mental Health Association (www.mentalhealth.org): 625.235.3895 or 852-950-1503.  [place] Crisis Text Line: Text [place] to 358131  Suicide LifeLine Chat: suicideWoto.org/chat    Treatment Objective(s) Addressed in This Session:  The purpose of today's virtual visit is for this writer to provide oversight of patient's care while receiving program services. Specific treatment goals addressed included personal safety, symptoms stabilization and management, wellness and mental health, and community resources/discharge planning.     This author or another program provider will follow up with the patient as noted above.     Patient agrees with the current plan of care.    Jt Winston MD  6/19/23      Visit Details:  Type of service: In-person    Location (patient and provider): [facility] Adult Mental Health Outpatient Programmatic Care Offices        Medical Decision Making  The patient's presentation was of moderate complexity (2 or more stable chronic illnesses).    The patient's evaluation involved:  discussion of management or test interpretation with another health professional (IOP treatment team)    The patient's management necessitated moderate risk (prescription  drug management).     35 minutes spent on the date of the encounter doing chart review, patient visit, documentation and discussion with other provider(s)    This document completed in part using Dragon Medical One dictation software.  Please excuse any inadvertent word or phrase substitutions.

## 2023-06-19 NOTE — GROUP NOTE
Psychotherapy Group Note    PATIENT'S NAME: Everette Mayen  MRN:   [id number]  :   1975  ACCT. NUMBER: [id number]  DATE OF SERVICE: 23  START TIME: 11:00 AM  END TIME: 11:50 AM  FACILITATOR: Azeb Emanuel PsyD  TOPIC: MH EBP Group: Specialty Awareness  [facility] Adult Mental Health Day Treatment  TRACK: A2    NUMBER OF PARTICIPANTS: 6    Summary of Group / Topics Discussed:  Specialty Topics: Forgiveness: Patients were provided with an overview of the topic of forgiveness including how to better understand the nature of forgiveness of others and oneself. Exploring the phases of forgiveness and how one works them was covered. Patients were able to explore how practicing forgiveness may positively impact their functioning.     Patient Session Goals / Objectives:    Discussed definitions of forgiveness and self-forgiveness    Explored the phases and process of forgiveness    Discussed benefits of forgiveness to their relationships with themselves and others, connecting the concept to mindfulness and acceptance    Assisted patients in recognizing when practicing forgiveness may be helpful      Patient Participation / Response:  Fully participated with the group by sharing personal reflections / insights and openly received / provided feedback with other participants.    Identified / Expressed readiness to act on skill suggestions discussed in topic  The group discussed ways to manage holidays with family gatherings when they didn't appreciate past actions of family. The group shared and validated experiences and offered suggestions of how to set limits and leave when you wanted to go.  Treatment Plan:  Patient has a current master individualized treatment plan.  See Epic treatment plan for more information.    Natalee Eagle Psy., D,  Licensed Clinical Psychologist

## 2023-06-19 NOTE — GROUP NOTE
"Process Group Note    PATIENT'S NAME: Everette Mayen  MRN:   [id number]  :   1975  ACCT. NUMBER: [id number]  DATE OF SERVICE: 23  START TIME:  9:00 AM  END TIME:  9:50 AM  FACILITATOR: Celsa Arguello LGSW  TOPIC:  Process Group    Diagnoses:  296.89 Bipolar II Disorder Hypomanic  296.32 (F33.1) Major Depressive Disorder, Recurrent Episode, Moderate _ and With melancholic features.  300.02 (F41.1) Generalized Anxiety Disorder.      [facility] Adult Mental Health Day Treatment  TRACK: A2    NUMBER OF PARTICIPANTS: 6          Data:    Session content: At the start of this group, patients were invited to check in by identifying themselves, describing their current emotional status, and identifying issues to address in this group.   Area(s) of treatment focus addressed in this session included Symptom Management, Personal Safety and Community Resources/Discharge Planning.  Reported mood is \"guilty and tired\". She reports guilt related to setting boundaries with her ex-.  She plans on checking in about sleep concerns at her provider visit with Dr. Winston this morning.   Client denied safety concerns, including SI and SIB. Client denies any chemical use.  Client is taking medications as prescribed. Client's goal is explore her guilt.  Client reported plans to use the following coping skills: remembering the importance of setting boundaries, remembering to prioritize self first. Client talked about her work trip going well, including a admiration of nature and a positive interaction with a former student. Client is proud and grateful for setting boundaries.  She processed relationship with ex- briefly (before being excused for her provider visit).  She feels like her boundary setting is adding to her ex-'s stress levels.  Writer will explore this further with client in group session tomorrow.  Peers offered supportive feedback and validation.    Therapeutic " Interventions/Treatment Strategies:  Psychotherapist offered support, feedback and validation and reinforced use of skills. Treatment modalities used include Cognitive Behavioral Therapy. Interventions include Relationship Skills: Assisted patients in implementing more effective communication skills in their relationships, Encouraged development and maintenance  of healthy boundaries and Discussed strategies to promote healthier understanding of interpersonal relationships.    Assessment:    Patient response:   Patient responded to session by accepting feedback, giving feedback, listening, focusing on goals, being attentive and accepting support    Possible barriers to participation / learning include: and no barriers identified    Health Issues:   None reported       Substance Use Review:   Substance Use: No active concerns identified.    Mental Status/Behavioral Observations  Appearance:   Appropriate   Eye Contact:   Good   Psychomotor Behavior: Normal   Attitude:   Cooperative  Interested Friendly Pleasant  Orientation:   All  Speech   Rate / Production: Normal    Volume:  Normal   Mood:    Tired, guilty  Affect:    Appropriate   Thought Content:   Clear and Safety denies any current safety concerns including suicidal ideation, self-harm, and homicidal ideation  Thought Form:  Coherent  Logical     Insight:    Good     Plan:     Safety Plan: No current safety concerns identified.  Recommended that patient call 911 or go to the local ED should there be a change in any of these risk factors.     Barriers to treatment: None identified    Patient Contracts (see media tab):  None    Substance Use: Not addressed in session     Continue or Discharge: Patient will continue in Adult Day Treatment (ADT)  as planned. Patient is likely to benefit from learning and using skills as they work toward the goals identified in their treatment plan.      NICOLE Linares  June 19, 2023

## 2023-06-20 ENCOUNTER — HOSPITAL ENCOUNTER (OUTPATIENT)
Dept: BEHAVIORAL HEALTH | Facility: CLINIC | Age: 48
Discharge: HOME | End: 2023-06-20
Attending: PSYCHIATRY & NEUROLOGY
Payer: COMMERCIAL

## 2023-06-20 PROCEDURE — 90853 GROUP PSYCHOTHERAPY: CPT

## 2023-06-20 PROCEDURE — 90853 GROUP PSYCHOTHERAPY: CPT | Performed by: PSYCHOLOGIST

## 2023-06-20 NOTE — GROUP NOTE
Psychotherapy Group Note    PATIENT'S NAME: Everette Mayen  MRN:   [id number]  :   1975  ACCT. NUMBER: [id number]  DATE OF SERVICE: 23  START TIME: 10:00 AM  END TIME: 10:50 AM  FACILITATOR: Celsa Arguello LGSW  TOPIC: MH EBP Group: Coping Skills  [facility] Adult Mental Health Day Treatment  TRACK: A2    NUMBER OF PARTICIPANTS: 6    Summary of Group / Topics Discussed:  Self-Awareness: Looking Back, Looking Forward Reflection Activity: Topic focused on assisting patients in identifying personal accomplishments, future goals and how they relate to the management of mental health symptoms. Patients discussed the benefits of acknowledging their personal accomplishments and their impact on mood improvement, mindfulness, and perspective. Patients worked to increase time spent on recognition and appreciation of what is positive and working in their lives. The goal is to reduce rumination and negative thinking resulting in increased mindfulness and resilience. Patients will work to put skills into practice and problem-solve barriers.      Patient Session Goals / Objectives:    Reflected on past accomplishments and future desired goals using Looking Back, Looking Forward handout (Therapist Aid)    Identified barriers to recognition of personal accomplishments and future goals    Verbalized understanding of strategies to increase use of their strengths in management of daily symptoms        Patient Participation / Response:  Fully participated with the group by sharing personal reflections / insights and openly received / provided feedback with other participants.    Demonstrated understanding of topics discussed through group discussion and participation, Expressed understanding of the relevance / importance of coping skills at distressing times in life, Demonstrated knowledge of when to consider using a variety of coping skills in daily life, Identified barriers to applying coping skills, Identified  2-3 positive coping strategies that have helped maintain / improve symptoms in the past, Practiced 2-3 new coping skills in session and Identified / Expressed personal readiness to practice new coping skills    Treatment Plan:  Patient has a current master individualized treatment plan.  See Epic treatment plan for more information.    Celsa Arguello LGSW

## 2023-06-20 NOTE — GROUP NOTE
Psychotherapy Group Note    PATIENT'S NAME: Everette Mayen  MRN:   [id number]  :   1975  ACCT. NUMBER: [id number]  DATE OF SERVICE: 23  START TIME: 11:00 AM  END TIME: 11:50 AM  FACILITATOR: Azeb Emanuel PsyD  TOPIC:  EBP Group: [facility] Adult Mental Health Day Treatment  TRACK: A2    NUMBER OF PARTICIPANTS: 5    Summary of Group / Topics Discussed:  Mindfulness: What is Mindfulness: Patients received an overview on what mindfulness is and how mindfulness can benefit general health, mental health symptoms, and stressors. The history of mindfulness, its application to mental health therapies, and key concepts were also discussed. Patients discussed current awareness, knowledge, and practice of mindfulness skills. Patients also discussed barriers to mindfulness practice.     Patient Session Goals / Objectives:    Demonstrated understanding of key concepts and application to daily life    Identified when/how to use mindfulness     Resolved barriers to practice    Identified plan to use mindfulness in daily life      Patient Participation / Response:  Fully participated with the group by sharing personal reflections / insights and openly received / provided feedback with other participants.    Identified / Expressed personal readiness to practice mindfulness skills  The group participated in a discussion about different ways to do mindfulness. The participants shared their experiences and ideas. The group observed a Buddha Board evaporating and talked about letting go. The group participated in a 5 senses mindfulness exercise and movement.   Treatment Plan:  Patient has an initial individualized treatment plan that was created as part of their diagnostic assessment / admission process.  A master individualized treatment plan is in the process of being developed with the patient and multi-disciplinary care team.    Natalee Eagle Psy., D,  Licensed  Clinical Psychologist
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
## 2023-06-22 ENCOUNTER — HOSPITAL ENCOUNTER (OUTPATIENT)
Dept: BEHAVIORAL HEALTH | Facility: CLINIC | Age: 48
Discharge: HOME | End: 2023-06-22
Attending: PSYCHIATRY & NEUROLOGY
Payer: COMMERCIAL

## 2023-06-22 PROCEDURE — 90853 GROUP PSYCHOTHERAPY: CPT | Performed by: MARRIAGE & FAMILY THERAPIST

## 2023-06-22 PROCEDURE — 90853 GROUP PSYCHOTHERAPY: CPT

## 2023-06-22 NOTE — GROUP NOTE
Psychotherapy Group Note    PATIENT'S NAME: Everette Mayen  MRN:   [id number]  :   1975  ACCT. NUMBER: [id number]  DATE OF SERVICE: 23  START TIME: 10:00 AM  END TIME: 10:50 AM  FACILITATOR: Sridhar Johnston LMFT; Celsa Arguello LGSW  TOPIC: MH EBP Group: Self-Awareness  [facility] Adult Mental Health Day Treatment  TRACK: A-2    NUMBER OF PARTICIPANTS: 8    Patient Participation / Response:  Fully participated with the group by sharing personal reflections / insights and openly received / provided feedback with other participants.    Demonstrated understanding of topics discussed through group discussion and participation, Demonstrated understanding of values, strengths, and challenges to learn about themselves, Identified / Expressed readiness to act intentionally, increase self-compassion, promote personal growth, Verbalized understanding of ways to proactively manage illness, Identified plan to address barriers to practicing skills that promote self-awareness  and Practiced skills in session    Treatment Plan:  Patient has a current master individualized treatment plan.  See Epic treatment plan for more information.    MIREYA Ontiveros

## 2023-06-22 NOTE — GROUP NOTE
"Process Group Note    PATIENT'S NAME: Everette Mayen  MRN:   [id number]  :   1975  ACCT. NUMBER: [id number]  DATE OF SERVICE: 23  START TIME:  9:00 AM  END TIME:  9:50 AM  FACILITATOR: Sridhar Johnston LMFT; Celsa Arguello LGSW  TOPIC:  Process Group    Diagnoses:  296.89 Bipolar II Disorder Hypomanic  296.32 (F33.1) Major Depressive Disorder, Recurrent Episode, Moderate _ and With melancholic features.  300.02 (F41.1) Generalized Anxiety Disorder.      [facility] Adult Mental Health Day Treatment  TRACK: A2/5A (Merged)    NUMBER OF PARTICIPANTS: 9          Data:    Session content: At the start of this group, patients were invited to check in by identifying themselves, describing their current emotional status, and identifying issues to address in this group.   Area(s) of treatment focus addressed in this session included Symptom Management, Personal Safety and Community Resources/Discharge Planning.  Reported mood is anxious and accomplished. She noted anxiety and grief related to turning 48 yesterday (\"I expected to have my own family\").  Client denied safety concerns, including SI and SIB. Client reports an increase in social drinking due to it being her birthday week but denies concerns.  Client is taking medications as prescribed. Client's goal is to cope with rejection sensitivity related to her birthday party.  Client reported plans to use the following coping skills: remind herself \"it's okay if people don't come\". Client is proud of herself for getting a room of her home together to host her birthday party tonight (\"It's been a goal for a long time\").  Peers offered supportive feedback and validation.    Therapeutic Interventions/Treatment Strategies:  Psychotherapist offered support, feedback and validation and reinforced use of skills. Treatment modalities used include Cognitive Behavioral Therapy. Interventions include Cognitive Restructuring:  Assisted patient in " formulating new neutral/positive alternatives to challenge less helpful / ineffective thoughts, Coping Skills: Facilitated discussion on learning and applying radical acceptance skill and Other: Explored with patient how life transitions may impact mental health and functioning .    Assessment:    Patient response:   Patient responded to session by accepting feedback, giving feedback, listening, focusing on goals, being attentive and accepting support    Possible barriers to participation / learning include: and no barriers identified    Health Issues:   None reported       Substance Use Review:   Substance Use: No active concerns identified.    Mental Status/Behavioral Observations  Appearance:   Appropriate   Eye Contact:   Good   Psychomotor Behavior: Normal   Attitude:   Cooperative  Interested Friendly Pleasant  Orientation:   All  Speech   Rate / Production: Normal    Volume:  Normal   Mood:    Anxious  Accomplished  Affect:    Appropriate   Thought Content:   Clear and Safety denies any current safety concerns including suicidal ideation, self-harm, and homicidal ideation  Thought Form:  Coherent  Logical     Insight:    Good     Plan:     Safety Plan: No current safety concerns identified.  Recommended that patient call 911 or go to the local ED should there be a change in any of these risk factors.     Barriers to treatment: None identified    Patient Contracts (see media tab):  None    Substance Use: Not addressed in session     Continue or Discharge: Patient will continue in Adult Day Treatment (ADT)  as planned. Patient is likely to benefit from learning and using skills as they work toward the goals identified in their treatment plan.      NICOLE Linares  June 22, 2023

## 2023-06-26 ENCOUNTER — HOSPITAL ENCOUNTER (OUTPATIENT)
Dept: BEHAVIORAL HEALTH | Facility: CLINIC | Age: 48
Discharge: HOME | End: 2023-06-26
Attending: PSYCHIATRY & NEUROLOGY
Payer: COMMERCIAL

## 2023-06-26 PROCEDURE — 90853 GROUP PSYCHOTHERAPY: CPT | Performed by: MARRIAGE & FAMILY THERAPIST

## 2023-06-26 PROCEDURE — 90853 GROUP PSYCHOTHERAPY: CPT

## 2023-06-26 NOTE — GROUP NOTE
Psychoeducation Group Note    PATIENT'S NAME: Everette Mayen  MRN:   [id number]  :   1975  ACCT. NUMBER: [id number]  DATE OF SERVICE: 23  START TIME: 10:00 AM  END TIME: 10:50 AM  FACILITATOR: Celsa Arguello LGSW  TOPIC: MH Wellness Group: Health Maintenance  [facility] Day Treatment  TRACK: A2    NUMBER OF PARTICIPANTS: 4    Summary of Group / Topics Discussed:  Health Maintenance: Eight Dimensions of Wellness: The concept of holistic health through the model of eight dimensions was introduced. Group members participated in identifying behaviors and activities in each of the dimensions of wellness.  The importance of each dimension was reinforced and the concept of balance in life as it relates to wellness was explored.      Patient Session Goals / Objectives:    Verbalized understanding of balance in wellness and how it relates to their life    Identified and explained the eight dimensions of wellness    Categorized activities and wellness needs into corresponding dimensions appropriately during exercise        Patient Participation / Response:  Fully participated with the group by sharing personal reflections / insights and openly received / provided feedback with other participants.    Demonstrated understanding of topics discussed through group discussion and participation, Identified / Expressed personal readiness to practice skills and Verbalized understanding of health maintenance topic    Treatment Plan:  Patient has a current master individualized treatment plan.  See Epic treatment plan for more information.    NICOLE Linares

## 2023-06-26 NOTE — GROUP NOTE
Psychotherapy Group Note    PATIENT'S NAME: Everette Mayen  MRN:   [id number]  :   1975  ACCT. NUMBER: [id number]  DATE OF SERVICE: 23  START TIME: 11:00 AM  END TIME: 11:50 AM  FACILITATOR: Sridhar Johnston LMFT  TOPIC: MH EBP Group: Relationship Skills  [facility] Day Treatment  TRACK: A2    NUMBER OF PARTICIPANTS: 4    Summary of Group / Topics Discussed:  Relationship Skills: Boundaries: Patients were provided with a general overview of interpersonal boundaries and how lack of boundaries relates to symptoms and functioning. The purpose is to help patients identify boundary issues and gain awareness and skills to work towards healthier interpersonal boundaries. Current awareness of healthy boundary characteristics and barriers to establishing healthy boundaries were discussed.    Patient Session Goals / Objectives:    Familiarized patients with the concept of interpersonal boundaries and their characteristics    Discussed and practiced strategies to promote healthier interpersonal boundaries    Identified boundary issues and identified plan to improve boundaries      Patient Participation / Response:  Fully participated with the group by sharing personal reflections / insights and openly received / provided feedback with other participants.    Demonstrated understanding of topics discussed through group discussion and participation, Demonstrated understanding of relationship skills and communication skills, Identified / Expressed personal readiness to incorporate effective communication skills, Verbalized understanding of communication skills, communication challenges, and communication strengths, Identified plan to address barriers to practicing relationship skills  and Practiced skills in session    Treatment Plan:  Patient has a current master individualized treatment plan.  See Epic treatment plan for more information.    MIREYA Ontiveros

## 2023-06-26 NOTE — GROUP NOTE
"Process Group Note    PATIENT'S NAME: Everette Mayen  MRN:   [id number]  :   1975  ACCT. NUMBER: [id number]  DATE OF SERVICE: 23  START TIME:  9:00 AM  END TIME:  9:50 AM  FACILITATOR: Celsa Arguello LGSW  TOPIC:  Process Group    Diagnoses:  296.89 Bipolar II Disorder Hypomanic  296.32 (F33.1) Major Depressive Disorder, Recurrent Episode, Moderate _ and With melancholic features.  300.02 (F41.1) Generalized Anxiety Disorder.      [facility] Adult Mental Health Day Treatment  TRACK: A2    NUMBER OF PARTICIPANTS: 4          Data:    Session content: At the start of this group, patients were invited to check in by identifying themselves, describing their current emotional status, and identifying issues to address in this group.   Area(s) of treatment focus addressed in this session included Symptom Management, Personal Safety and Community Resources/Discharge Planning.  Reported mood is \"happy, grateful, relieved, content\".   Client denied safety concerns, including SI and SIB. Client reports using \"more chemicals than usual\" during her birthday celebrations last week but anticipates that decreasing this week.  Client is taking medications as prescribed. Client's goal is to 'refocus on my work\" and brainstorm \"how to keep skills in my life\".  Client reported plans to use the following coping skills: re-organize her IOP handouts to increase accessibility. She reported on her 48th birthday festivities \"it was so fun.. I made spring rolls. I got to show off my clean house.  I did the hard things first\".  Client talked about a \"life lesson\" she learned after she accepted help a friend offered at one of the parties she hosted. Client is grateful for for the support of her friends. Peers offered supportive feedback and validation.    Therapeutic Interventions/Treatment Strategies:  Psychotherapist offered support, feedback and validation and reinforced use of skills. Treatment modalities used " "include Cognitive Behavioral Therapy. Interventions include Behavioral Activation: Encouraged strategies to reduce individual procrastination and increase motivation by increasing goal-directed activities to enhance mood and reduce symptoms. and Relationship Skills: Discussed strategies to promote healthier understanding of interpersonal relationships.    Assessment:    Patient response:   Patient responded to session by accepting feedback, giving feedback, listening, focusing on goals, being attentive and accepting support    Possible barriers to participation / learning include: and no barriers identified    Health Issues:   None reported       Substance Use Review:   Substance Use: No active concerns identified.    Mental Status/Behavioral Observations  Appearance:   Appropriate   Eye Contact:   Good   Psychomotor Behavior: Normal   Attitude:   Cooperative  Interested Friendly Pleasant  Orientation:   All  Speech   Rate / Production: Normal    Volume:  Normal   Mood:    Normal \"happy, content, relieved, grateful\"  Affect:    Appropriate   Thought Content:   Clear and Safety denies any current safety concerns including suicidal ideation, self-harm, and homicidal ideation  Thought Form:  Coherent  Logical     Insight:    Good     Plan:     Safety Plan: No current safety concerns identified.  Recommended that patient call 911 or go to the local ED should there be a change in any of these risk factors.     Barriers to treatment: None identified    Patient Contracts (see media tab):  None    Substance Use: Not addressed in session     Continue or Discharge: Patient will continue in Adult Day Treatment (ADT)  as planned. Patient is likely to benefit from learning and using skills as they work toward the goals identified in their treatment plan.      NICOLE Linares  June 26, 2023    "

## 2023-06-27 ENCOUNTER — HOSPITAL ENCOUNTER (OUTPATIENT)
Dept: BEHAVIORAL HEALTH | Facility: CLINIC | Age: 48
Discharge: HOME | End: 2023-06-27
Attending: PSYCHIATRY & NEUROLOGY
Payer: COMMERCIAL

## 2023-06-27 PROCEDURE — 90853 GROUP PSYCHOTHERAPY: CPT

## 2023-06-27 PROCEDURE — 90853 GROUP PSYCHOTHERAPY: CPT | Performed by: MARRIAGE & FAMILY THERAPIST

## 2023-06-27 NOTE — GROUP NOTE
Psychoeducation Group Note    PATIENT'S NAME: Everette Mayen  MRN:   [id number]  :   1975  ACCT. NUMBER: [id number]  DATE OF SERVICE: 23  START TIME: 10:00 AM  END TIME: 10:50 AM  FACILITATOR: Celsa Arguello LGSW  TOPIC: MH Wellness Group: Mental Health Maintenance  [facility] Adult Mental Health Day Treatment  TRACK: A2    NUMBER OF PARTICIPANTS: 7    Summary of Group / Topics Discussed:  Mental Health Maintenance:  Vulnerability: In this group, the concept of vulnerability was explored through the viewing, discussion, and self-reflection of the Elio Dick Talk Titled,  The Power of Vulnerability.      Patient Session Goals / Objectives:  ? Defined and described definition of vulnerability   ? Identified 2 or more ways of practicing authenticity         Patient Participation / Response:  Fully participated with the group by sharing personal reflections / insights and openly received / provided feedback with other participants.    Demonstrated understanding of topics discussed through group discussion and participation, Identified / Expressed personal readiness to practice skills and Verbalized understanding of mental health maintenance topic    Treatment Plan:  Patient has a current master individualized treatment plan.  See Epic treatment plan for more information.    NICOLE Linares

## 2023-06-27 NOTE — GROUP NOTE
"Process Group Note    PATIENT'S NAME: Everette Mayen  MRN:   [id number]  :   1975  ACCT. NUMBER: [id number]  DATE OF SERVICE: 23  START TIME:  9:00 AM  END TIME:  9:50 AM  FACILITATOR: Celsa Arguello LGSW  TOPIC:  Process Group    Diagnoses:  296.89 Bipolar II Disorder Hypomanic  296.32 (F33.1) Major Depressive Disorder, Recurrent Episode, Moderate _ and With melancholic features.  300.02 (F41.1) Generalized Anxiety Disorder.      [facility] Adult Mental Health Day Treatment  TRACK: A2    NUMBER OF PARTICIPANTS: 7          Data:    Session content: At the start of this group, patients were invited to check in by identifying themselves, describing their current emotional status, and identifying issues to address in this group.   Area(s) of treatment focus addressed in this session included Symptom Management, Personal Safety and Community Resources/Discharge Planning.  Reported mood is optimistic (\"in all aspects of life\"), let down and worried.  She explained feeling let down (but also proud) after texting her ex- requesting that he move a heavy item of his out of her home. She reports worry stemming from her optimism (\"is it real optimism or nir?\").  Writer encouraged client to accept the emotion and monitor the associated behaviors.  She reported scheduling an appointment with a new therapist.   Client denied safety concerns, including SI and SIB. Client denies any chemical use.  Client is taking medications as prescribed. Client's goal is to follow up on her wellness goals from yesterday's discussion (organizing and paying medical bills).  Client reported plans to use the following coping skills: organization, setting a timer to remember to take medications on time. Peers offered supportive feedback and validation.    Therapeutic Interventions/Treatment Strategies:  Psychotherapist offered support, feedback and validation and reinforced use of skills. Treatment modalities used " include Cognitive Behavioral Therapy. Interventions include Behavioral Activation: Encouraged strategies to reduce individual procrastination and increase motivation by increasing goal-directed activities to enhance mood and reduce symptoms. and Emotions Management:  Increased awareness of daily mood patterns/changes.    Assessment:    Patient response:   Patient responded to session by accepting feedback, giving feedback, listening, focusing on goals, being attentive and accepting support    Possible barriers to participation / learning include: and no barriers identified    Health Issues:   None reported       Substance Use Review:   Substance Use: No active concerns identified.    Mental Status/Behavioral Observations  Appearance:   Appropriate   Eye Contact:   Good   Psychomotor Behavior: Normal   Attitude:   Cooperative  Interested Friendly Pleasant  Orientation:   All  Speech   Rate / Production: Normal    Volume:  Normal   Mood:    Optimistic, worried  Affect:    Appropriate   Thought Content:   Clear and Safety denies any current safety concerns including suicidal ideation, self-harm, and homicidal ideation  Thought Form:  Coherent  Logical     Insight:    Good     Plan:     Safety Plan: No current safety concerns identified.  Recommended that patient call 911 or go to the local ED should there be a change in any of these risk factors.     Barriers to treatment: None identified    Patient Contracts (see media tab):  None    Substance Use: Not addressed in session     Continue or Discharge: Patient will continue in Adult Day Treatment (ADT)  as planned. Patient is likely to benefit from learning and using skills as they work toward the goals identified in their treatment plan.      NICOLE Linares  June 27, 2023

## 2023-06-27 NOTE — GROUP NOTE
Psychotherapy Group Note    PATIENT'S NAME: Everette Mayen  MRN:   [id number]  :   1975  ACCT. NUMBER: [id number]  DATE OF SERVICE: 23  START TIME: 11:00 AM  END TIME: 11:50 AM  FACILITATOR: Sridhar Johnston LMFT  TOPIC: MH EBP Group: Relationship Skills  [facility] Day Treatment  TRACK: A-2    NUMBER OF PARTICIPANTS: 5    Summary of Group / Topics Discussed:  Relationship Skills: Assertive Communication: Patients were provided with a general overview of assertive communication skills and how practicing assertive communication skills will assist patients in developing healthier and more effective relationships. Patients reviewed their current awareness on ability to practice assertive communication, ways to increase assertive communication, and identified/problem solved barriers to assertive communication.     Patient Session Goals / Objectives:    Identified and discussed patient individual challenges with communication    Presented and practiced effective communication skills in session    Assisted patients in implementing more effective communication skills in their relationships      Patient Participation / Response:  Fully participated with the group by sharing personal reflections / insights and openly received / provided feedback with other participants.    Demonstrated understanding of topics discussed through group discussion and participation, Demonstrated understanding of relationship skills and communication skills, Identified / Expressed personal readiness to incorporate effective communication skills, Verbalized understanding of communication skills, communication challenges, and communication strengths and Identified plan to address barriers to practicing relationship skills     Treatment Plan:  Patient has a current master individualized treatment plan.  See Epic treatment plan for more information.    MIREYA Ontiveros

## 2023-06-29 ENCOUNTER — TELEPHONE (OUTPATIENT)
Dept: BEHAVIORAL HEALTH | Facility: CLINIC | Age: 48
End: 2023-06-29

## 2023-06-29 ENCOUNTER — HOSPITAL ENCOUNTER (OUTPATIENT)
Dept: BEHAVIORAL HEALTH | Facility: CLINIC | Age: 48
Discharge: HOME | End: 2023-06-29
Attending: PSYCHIATRY & NEUROLOGY
Payer: COMMERCIAL

## 2023-06-29 PROCEDURE — 90853 GROUP PSYCHOTHERAPY: CPT | Performed by: PSYCHOLOGIST

## 2023-06-29 PROCEDURE — 90853 GROUP PSYCHOTHERAPY: CPT

## 2023-06-29 NOTE — GROUP NOTE
Psychotherapy Group Note    PATIENT'S NAME: Everette Mayen  MRN:   [id number]  :   1975  ACCT. NUMBER: [id number]  DATE OF SERVICE: 23  START TIME: 11:00 AM  END TIME: 11:50 AM  FACILITATOR: Azeb Emanuel PsyD  TOPIC: MH EBP Group: Social Support  [facility] Adult Mental Health Day Treatment  TRACK: A2    NUMBER OF PARTICIPANTS: 6    Summary of Group / Topics Discussed:  Social Support:  Building and educating social support systems: The patients engaged in a discussion of how mental health symptoms are related to psychosocial impairment. The patients also shared experiences of when stigma associated with mental illness has created barriers between themselves and their social network. The patients benefitted from this group by exploring ways in which they can re-engage  and build a social network to decrease feelings of isolation and loneliness.    Patient Session Goals / Objectives:    Reduce overidentification/fusion with mental illness as being a primary definer of identity.     Identify ways that support network can assist with recovery.     Reduce stigma associated with mental health diagnosis(es).    Improve interpersonal communication regarding symptoms    Build a sense of mastery and self-respect        Patient Participation / Response:  Fully participated with the group by sharing personal reflections / insights and openly received / provided feedback with other participants.    The group discussed their weekend plans and problem-solved around issues related to their anxiety or depression. They offered suggestions for how to manage difficult relationships and what to do if they felt that they had to change plans.     Treatment Plan:  Patient has a current master individualized treatment plan.  See Epic treatment plan for more information.    Natalee Eagle Psy., D,  Licensed Clinical Psychologist

## 2023-06-29 NOTE — TELEPHONE ENCOUNTER
----- Message from NICOLE Linares sent at 6/28/2023  1:15 PM CDT -----  Regarding: Addtl Sessions Needed  Scheduling Request    Patient Name: Everette Mayen  Location of programming: [facility]  Start Date: June / 29 / 2023  Group: [facility] on Monday, Tuesday, and Thursday at 9:00 AM to 12:00 PM  Attending Provider (MD): Dr. Jt Winston  Number of visits to be scheduled: 3          Duration of Appointment in minutes: 180  Visit Type: In-person or Treatment - 870    Additional notes: Mable needs three more appts (6/29, 7/3, 7/6).  Thank you!

## 2023-06-30 NOTE — GROUP NOTE
Psychotherapy Group Note    PATIENT'S NAME: Everette Mayen  MRN:   [id number]  :   1975  ACCT. NUMBER: [id number]  DATE OF SERVICE: 23  START TIME: 10:00 AM  END TIME: 10:50 AM  FACILITATOR: Celsa Arguello LGSW  TOPIC:  EBP Group: [facility] Adult Mental Health Day Treatment  TRACK: A2    NUMBER OF PARTICIPANTS: 6    Summary of Group / Topics Discussed:  Mindfulness: Mindfulness Experiential: Patients received an overview on what mindfulness is and how mindfulness can benefit general health, mental health symptoms, and stressors. The history of mindfulness, its application to mental health therapies, and key concepts were also discussed. Patients discussed current awareness, knowledge, and practice of mindfulness skills. Patients also discussed barriers to mindfulness practice.    Patient Session Goals / Objectives:    Demonstrated and verbalized understanding of key mindfulness concepts    Identified when/how to use mindfulness skills    Resolved barriers to practicing mindfulness skills    Identified plan to use mindfulness skills in daily life       Patient Participation / Response:  Fully participated with the group by sharing personal reflections / insights and openly received / provided feedback with other participants.    Demonstrated understanding of topics discussed through group discussion and participation, Demonstrated understanding of mindfulness skills and benefits of practice, Identified / Expressed personal readiness to practice mindfulness skills, Verbalized understanding of how mindfulness can benefit mental health symptoms and Practiced skills in session    Treatment Plan:  Patient has a current master individualized treatment plan.  See Epic treatment plan for more information.    NICOLE Linares

## 2023-06-30 NOTE — GROUP NOTE
"Process Group Note    PATIENT'S NAME: Everette Mayen  MRN:   [id number]  :   1975  ACCT. NUMBER: [id number]  DATE OF SERVICE: 23  START TIME:  9:00 AM  END TIME:  9:50 AM  FACILITATOR: Celsa Arguello LGSW  TOPIC:  Process Group    Diagnoses:  296.89 Bipolar II Disorder Hypomanic  296.32 (F33.1) Major Depressive Disorder, Recurrent Episode, Moderate _ and With melancholic features.  300.02 (F41.1) Generalized Anxiety Disorder.      [facility] Adult Mental Health Day Treatment  TRACK: A2    NUMBER OF PARTICIPANTS: 7          Data:    Session content: At the start of this group, patients were invited to check in by identifying themselves, describing their current emotional status, and identifying issues to address in this group.   Area(s) of treatment focus addressed in this session included Symptom Management, Personal Safety and Community Resources/Discharge Planning.  Reported mood is \"accomplished, resentful and angry\".  She feels accomplished after completing her financial wellness goals this week (\"I see the progress\").  Stressors include finding many items of her ex-'s still in her house (\"he never moved out\").  She reported having a related nightmare about this last night.  Client denied safety concerns, including SI and SIB. Client reports some alcohol use since last check in but denies concern.  Client is taking medications as prescribed. Client's goal is to \"work through my anger\".  Client reported plans to use the following coping skills: affirming herself with the personal bill of rights; set a deadline for her ex- to come  his stuff.  Client endorsed feeling proud of cleaning out a room of her house \"for me\" and not her ex-.  Client is grateful for a friend who she had dinner with last night. Peers offered supportive feedback and validation.      Therapeutic Interventions/Treatment Strategies:  Psychotherapist offered support, feedback and validation " "and reinforced use of skills. Treatment modalities used include Cognitive Behavioral Therapy. Interventions include Cognitive Restructuring:  Assisted patient in formulating new neutral/positive alternatives to challenge less helpful / ineffective thoughts and Assisted patient in identifying new neutral/positive core beliefs, Coping Skills: Discussed how the use of intentional \"in the moment\" actions can help reduce distress and Relationship Skills: Encouraged development and maintenance  of healthy boundaries.    Assessment:    Patient response:   Patient responded to session by accepting feedback, giving feedback, listening, focusing on goals, being attentive and accepting support    Possible barriers to participation / learning include: and no barriers identified    Health Issues:   None reported       Substance Use Review:   Substance Use: No active concerns identified.    Mental Status/Behavioral Observations  Appearance:   Appropriate   Eye Contact:   Good   Psychomotor Behavior: Normal   Attitude:   Cooperative  Interested Friendly Pleasant  Orientation:   All  Speech   Rate / Production: Normal    Volume:  Normal   Mood:    Angry  Resentful, accomplished  Affect:    Appropriate   Thought Content:   Clear and Safety denies any current safety concerns including suicidal ideation, self-harm, and homicidal ideation  Thought Form:  Coherent  Logical     Insight:    Good     Plan:     Safety Plan: No current safety concerns identified.  Recommended that patient call 911 or go to the local ED should there be a change in any of these risk factors.     Barriers to treatment: None identified    Patient Contracts (see media tab):  None    Substance Use: Not addressed in session     Continue or Discharge: Patient will continue in Adult Day Treatment (ADT)  as planned. Patient is likely to benefit from learning and using skills as they work toward the goals identified in their treatment plan.      Celsa Arguello, [facility]  June " 30, 2023

## 2023-07-06 ENCOUNTER — HOSPITAL ENCOUNTER (OUTPATIENT)
Dept: BEHAVIORAL HEALTH | Facility: CLINIC | Age: 48
Discharge: HOME | End: 2023-07-06
Attending: PSYCHIATRY & NEUROLOGY
Payer: COMMERCIAL

## 2023-07-06 PROCEDURE — 90853 GROUP PSYCHOTHERAPY: CPT | Performed by: PSYCHOLOGIST

## 2023-07-06 PROCEDURE — 90853 GROUP PSYCHOTHERAPY: CPT

## 2023-07-06 ASSESSMENT — COLUMBIA-SUICIDE SEVERITY RATING SCALE - C-SSRS
1. SINCE LAST CONTACT, HAVE YOU WISHED YOU WERE DEAD OR WISHED YOU COULD GO TO SLEEP AND NOT WAKE UP?: NO
2. HAVE YOU ACTUALLY HAD ANY THOUGHTS OF KILLING YOURSELF?: NO

## 2023-07-06 NOTE — GROUP NOTE
Psychotherapy Group Note    PATIENT'S NAME: Everette Mayen  MRN:   [id number]  :   1975  ACCT. NUMBER: [id number]  DATE OF SERVICE: 23  START TIME: 10:00 AM  END TIME: 10:50 AM  FACILITATOR: Celsa Arguello LGSW  TOPIC:  EBP Group: Self-Awareness  [facility] Adult Mental Health Day Treatment  TRACK: A2/5A Merged    NUMBER OF PARTICIPANTS: 6, including 2 from 5A    Summary of Group / Topics Discussed:  Self-Awareness: Personal Strengths: Topic focused on assisting patients in identifying personal strengths and how they relate to the management of mental health symptoms. Patients discussed the benefits of acknowledging their personal strengths and their impact on mood improvement, mindfulness, and perspective. Patients worked to increase time spent on recognition and appreciation of what is positive and working in their lives. The goal is to reduce rumination and negative thinking resulting in increased mindfulness and resilience. Patients will work to put skills into practice and problem-solve barriers.     Patient Session Goals / Objectives:    Identified personal strengths    Identified barriers to recognition of personal strengths    Verbalized understanding of strategies to increase use of their strengths in management of daily symptoms      Patient Participation / Response:  Fully participated with the group by sharing personal reflections / insights and openly received / provided feedback with other participants.    Demonstrated understanding of topics discussed through group discussion and participation, Demonstrated understanding of values, strengths, and challenges to learn about themselves, Verbalized understanding of ways to proactively manage illness and Identified plan to address barriers to practicing skills that promote self-awareness     Treatment Plan:  Patient has See Epic Treatment Plan - Patient is discharging.    NICOLE Linares

## 2023-07-06 NOTE — GROUP NOTE
Psychotherapy Group Note    PATIENT'S NAME: Everette Mayen  MRN:   [id number]  :   1975  ACCT. NUMBER: [id number]  DATE OF SERVICE: 23  START TIME: 11:00 AM  END TIME: 11:50 AM  FACILITATOR: Azeb Emanuel PsyD  TOPIC:  EBP Group: Social Support  [facility] Adult Mental Health Day Treatment  TRACK: A2 and 5A joined with it    NUMBER OF PARTICIPANTS: 4    Summary of Group / Topics Discussed:  Social Support:  Building and educating social support systems: The patients engaged in a discussion of how  mental health symptoms are related to psychosocial impairment. The patients also shared experiences of when stigma associated with mental illness has created barriers fo social network. The patients benefitted from this group by exploring ways in which they engage in social network to decrease feelings of isolation and loneliness.    Patient Session Goals / Objectives:    Reduce overidentification/fusion with mental illness as being a primary definer of identity.     Identify ways that support network can assist with recovery.     Reduce stigma associated with mental health diagnosis(es).    Improve interpersonal communication regarding symptoms    Build a sense of mastery and self-respect        Patient Participation / Response:  Fully participated with the group by sharing personal reflections / insights and openly received / provided feedback with other participants.    The group participated in a discussion about anxiety and what symptoms were hardest to manage. The group discussed their weekend plans and how they planned to practice skills to manage anxiety or other moods.    Treatment Plan:  Patient has a current master individualized treatment plan.  See Epic treatment plan for more information.    Natalee Eagle Psy., D,  Licensed Clinical Psychologist

## 2023-07-06 NOTE — GROUP NOTE
"Process Group Note    PATIENT'S NAME: Everette Mayen  MRN:   [id number]  :   1975  ACCT. NUMBER: [id number]  DATE OF SERVICE: 23  START TIME:  9:00 AM  END TIME:  9:50 AM  FACILITATOR: Dorina Cruz LGSW; Celsa Arguello LGSW  TOPIC:  Process Group    Diagnoses:  296.89 Bipolar II Disorder Hypomanic  296.32 (F33.1) Major Depressive Disorder, Recurrent Episode, Moderate _ and With melancholic features.  300.02 (F41.1) Generalized Anxiety Disorder.      [facility] Day [facility]  TRACK: A2    NUMBER OF PARTICIPANTS: 6, including 2 patients from 5A          Data:    Session content: At the start of this group, patients were invited to check in by identifying themselves, describing their current emotional status, and identifying issues to address in this group.   Area(s) of treatment focus addressed in this session included Symptom Management, Personal Safety and Community Resources/Discharge Planning.  Client attended her last day of IOP today.  Reported mood is overall \"good\" although she noted a range of emotions (\"accomplished, prepared, excited, anxious, overwhelmed, guilty\").  Client denied safety concerns, including SI and SIB. Client reported some alcohol use since last check in.  Client is taking medications as prescribed. She talked about anxiety and catastrophic thinking related to doctors finding a spot on her dad's lung on Monday.  She also worries about potentially having to be primary caretaker for her mom when her dad passes away.  She processed fears of being alone (\"who's going to take care of me?\").  She feels like Monday was a test to see if she could use the skills she learned.  She endorsed self-trust in her ability to cope effectively. Client reported plans to use the following coping skills: talking about her fears to reduce catastrophic thinking, grounding, positive self-talk, label catastrophic thinking.  Client is grateful for group.  She is " looking forward to a solo trip to attend a concert at [employer] [facility].  However, she would like to reach out to friends to accompany her on future trips.  She briefly talked about sluggishness she feels after stopping her three hours daily exercise routine.  She plans to start up a new, shorter exercise routine to increase energy.  Peers offered supportive feedback and validation.      Therapeutic Interventions/Treatment Strategies:  Psychotherapist offered support, feedback and validation and reinforced use of skills. Treatment modalities used include Cognitive Behavioral Therapy. Interventions include Behavioral Activation: Explored how behaviors effect mood and interact with thoughts and feelings and Encouraged strategies to reduce individual procrastination and increase motivation by increasing goal-directed activities to enhance mood and reduce symptoms., Cognitive Restructuring:  Explored impact of ineffective thoughts / distortions on mood and activity and Assisted patient in formulating new neutral/positive alternatives to challenge less helpful / ineffective thoughts and Coping Skills: Promoted understanding of how and when to apply grounding strategies to reduce distress and increase presence in the moment.    Assessment:    Patient response:   Patient responded to session by accepting feedback, giving feedback, listening, focusing on goals, being attentive and accepting support    Possible barriers to participation / learning include: and no barriers identified    Health Issues:   None reported       Substance Use Review:   Substance Use: No active concerns identified.    Mental Status/Behavioral Observations  Appearance:   Appropriate   Eye Contact:   Good   Psychomotor Behavior: Normal   Attitude:   Cooperative  Interested Friendly Pleasant  Orientation:   All  Speech   Rate / Production: Normal    Volume:  Normal   Mood:    Anxious   Affect:    Appropriate   Thought Content:   Clear and Safety denies any  current safety concerns including suicidal ideation, self-harm, and homicidal ideation  Thought Form:  Coherent  Logical     Insight:    Good     Plan:     Safety Plan: No current safety concerns identified.  Recommended that patient call 911 or go to the local ED should there be a change in any of these risk factors.     Barriers to treatment: None identified    Patient Contracts (see media tab):  None    Substance Use: Not addressed in session     Continue or Discharge: Patient is being discharged today. See Treatment Plan and Discharge Summary.       Dorina Cruz, NICOLE  July 6, 2023

## 2023-07-27 ENCOUNTER — MYC MEDICAL ADVICE (OUTPATIENT)
Dept: FAMILY MEDICINE | Facility: CLINIC | Age: 48
End: 2023-07-27
Payer: COMMERCIAL

## 2023-07-27 NOTE — TELEPHONE ENCOUNTER
I'm happy to assist, but would like to have a video visit to review prior to refilling medications

## 2023-07-27 NOTE — TELEPHONE ENCOUNTER
Patient requesting for PCP to take over prescriptions for Lurasidone and Trazadone prescriptions. Please advise if appropriate to take over Rx's.    Tyrel Graham CMA on 7/27/2023 at 2:36 PM

## 2023-07-28 NOTE — TELEPHONE ENCOUNTER
Left message for patient to return our call back. Also sent a [employer] message with PCP's message.    Francy PEDERSON MA on 7/28/2023 at 2:27 PM

## 2023-07-28 NOTE — TELEPHONE ENCOUNTER
Patient scheduled Video Visit with PCP on Fri 8/4/23 at 9am. Check In time 8:40am for the video visit.

## 2023-08-03 ASSESSMENT — ANXIETY QUESTIONNAIRES
IF YOU CHECKED OFF ANY PROBLEMS ON THIS QUESTIONNAIRE, HOW DIFFICULT HAVE THESE PROBLEMS MADE IT FOR YOU TO DO YOUR WORK, TAKE CARE OF THINGS AT HOME, OR GET ALONG WITH OTHER PEOPLE: SOMEWHAT DIFFICULT
3. WORRYING TOO MUCH ABOUT DIFFERENT THINGS: SEVERAL DAYS
4. TROUBLE RELAXING: SEVERAL DAYS
GAD7 TOTAL SCORE: 4
1. FEELING NERVOUS, ANXIOUS, OR ON EDGE: MORE THAN HALF THE DAYS
GAD7 TOTAL SCORE: 4
2. NOT BEING ABLE TO STOP OR CONTROL WORRYING: NOT AT ALL
6. BECOMING EASILY ANNOYED OR IRRITABLE: NOT AT ALL
7. FEELING AFRAID AS IF SOMETHING AWFUL MIGHT HAPPEN: NOT AT ALL
5. BEING SO RESTLESS THAT IT IS HARD TO SIT STILL: NOT AT ALL

## 2023-08-03 NOTE — PROGRESS NOTES
Mable is a 48 year old who is being evaluated via a billable video visit.      How would you like to obtain your AVS? MyChart  If the video visit is dropped, the invitation should be resent by: Text to cell phone: [phone]  Will anyone else be joining your video visit? No      year old, presenting for the following health issues:  Recheck Medication      History of Present Illness       Mental Health Follow-up:  Patient presents to follow-up on Depression & Anxiety.Patient's depression since last visit has been:  Better  The patient is not having other symptoms associated with depression.  Patient's anxiety since last visit has been:  Better  The patient is not having other symptoms associated with anxiety.  Any significant life events: grief or loss  Patient is feeling anxious or having panic attacks.  Patient has no concerns about alcohol or drug use.    She eats 2-3 servings of fruits and vegetables daily.She consumes 0 sweetened beverage(s) daily.She exercises with enough effort to increase her heart rate 30 to 60 minutes per day.  She exercises with enough effort to increase her heart rate 7 days per week. She is missing 1 dose(s) of medications per week.  She is not taking prescribed medications regularly due to remembering to take.     Bipolar disorder, in full remission, most recent episode depressed (H)    Everette Mayen has been taking Latuda x 6 months and no side effects.  Is due for follow up labs later this year.  Had labs in may for Humira and all were within normal limits.  She recently completed outpatient hospital psychiatric care as recommended to continue reestablish with myself within primary care for ongoing psychiatric medications.      Review of Systems   Constitutional, HEENT, cardiovascular, pulmonary, GI, , musculoskeletal, neuro, skin, endocrine and psych systems are negative, except as otherwise noted.      Objective           Vitals:  No vitals were obtained today due to  virtual visit.    Physical Exam   GENERAL: Healthy, alert and no distress  EYES: Eyes grossly normal to inspection.  No discharge or erythema, or obvious scleral/conjunctival abnormalities.  RESP: No audible wheeze, cough, or visible cyanosis.  No visible retractions or increased work of breathing.    SKIN: Visible skin clear. No significant rash, abnormal pigmentation or lesions.  NEURO: Cranial nerves grossly intact.  Mentation and speech appropriate for age.  PSYCH: Mentation appears normal, affect normal/bright, judgement and insight intact, normal speech and appearance well-groomed.    (Z12.11) Special screening for malignant neoplasms, colon  (primary encounter diagnosis)  Comment:  Colonoscopy referral placed  Plan: Colonoscopy Screening  Referral            (F31.76) Bipolar disorder, in full remission, most recent episode depressed (H)  Comment: Recommend continued use of Latuda, and we will plan to recheck labs in comprehensive metabolic panel, complete blood counts, and an A1c evaluate for at her next office visit in 6 months  Plan: lurasidone (LATUDA) 20 MG TABS tablet,         traZODone (DESYREL) 50 MG tablet            (Z12.31) Visit for screening mammogram  Comment:   Plan: *MA Screening Digital Bilateral                Video-Visit Details    Type of service:  Video Visit     Originating Location (pt. Location): Home    Distant Location (provider location):    Today albuterol SymbicortOn-site  Platform used for Video Visit: [employer]   Start Time: 9:15 AM  End Time: 9:24 AM

## 2023-08-04 ENCOUNTER — TELEPHONE (OUTPATIENT)
Dept: FAMILY MEDICINE | Facility: CLINIC | Age: 48
End: 2023-08-04

## 2023-08-04 ENCOUNTER — VIRTUAL VISIT (OUTPATIENT)
Dept: FAMILY MEDICINE | Facility: CLINIC | Age: 48
End: 2023-08-04
Payer: COMMERCIAL

## 2023-08-04 DIAGNOSIS — Z12.11 SPECIAL SCREENING FOR MALIGNANT NEOPLASMS, COLON: Primary | ICD-10-CM

## 2023-08-04 DIAGNOSIS — Z12.31 VISIT FOR SCREENING MAMMOGRAM: ICD-10-CM

## 2023-08-04 DIAGNOSIS — F31.76 BIPOLAR DISORDER, IN FULL REMISSION, MOST RECENT EPISODE DEPRESSED (H): ICD-10-CM

## 2023-08-04 PROCEDURE — 99213 OFFICE O/P EST LOW 20 MIN: CPT | Mod: VID | Performed by: INTERNAL MEDICINE

## 2023-08-04 RX ORDER — LURASIDONE HYDROCHLORIDE 20 MG/1
20 TABLET, FILM COATED ORAL
Qty: 90 TABLET | Refills: 3 | Status: SHIPPED | OUTPATIENT
Start: 2023-08-04 | End: 2024-07-03

## 2023-08-04 RX ORDER — TRAZODONE HYDROCHLORIDE 50 MG/1
50 TABLET, FILM COATED ORAL AT BEDTIME
Qty: 90 TABLET | Refills: 3 | Status: SHIPPED | OUTPATIENT
Start: 2023-08-04 | End: 2024-07-03

## 2023-08-04 NOTE — CONFIDENTIAL NOTE
Called to attempt to schedule a follow up visit in 6 months.  Left a voice mail to call the clinic at [phone] or to go to [facility] and schedule appointment in 6 months

## 2023-08-09 ENCOUNTER — HOSPITAL ENCOUNTER (OUTPATIENT)
Dept: MAMMOGRAPHY | Facility: CLINIC | Age: 48
Discharge: HOME | End: 2023-08-09
Attending: INTERNAL MEDICINE | Admitting: INTERNAL MEDICINE
Payer: COMMERCIAL

## 2023-08-09 DIAGNOSIS — Z12.31 ENCOUNTER FOR SCREENING MAMMOGRAM FOR BREAST CANCER: ICD-10-CM

## 2023-08-09 PROCEDURE — 77067 SCR MAMMO BI INCL CAD: CPT

## 2023-08-10 ENCOUNTER — VIRTUAL VISIT (OUTPATIENT)
Dept: PSYCHOLOGY | Facility: CLINIC | Age: 48
End: 2023-08-10
Payer: COMMERCIAL

## 2023-08-10 DIAGNOSIS — F31.81 BIPOLAR 2 DISORDER (H): Primary | ICD-10-CM

## 2023-08-10 PROCEDURE — 90834 PSYTX W PT 45 MINUTES: CPT | Mod: VID

## 2023-08-10 ASSESSMENT — PATIENT HEALTH QUESTIONNAIRE - PHQ9
10. IF YOU CHECKED OFF ANY PROBLEMS, HOW DIFFICULT HAVE THESE PROBLEMS MADE IT FOR YOU TO DO YOUR WORK, TAKE CARE OF THINGS AT HOME, OR GET ALONG WITH OTHER PEOPLE: SOMEWHAT DIFFICULT
SUM OF ALL RESPONSES TO PHQ QUESTIONS 1-9: 9
SUM OF ALL RESPONSES TO PHQ QUESTIONS 1-9: 9

## 2023-08-10 NOTE — PROGRESS NOTES
[facility]   Mental Health & Addiction Services     Progress Note - Initial Visit    Patient  Name:  Everette Mayen Date: 8.10.2023         Service Type: Individual     Visit Start Time: 9:00 AM  Visit End Time: 9:27 AM    Visit #: 1    Attendees: Client attended alone    Service Modality:  Video Visit:      Provider verified identity through the following two step process.  Patient provided:  Patient  and Patient address    Telemedicine Visit: The patient's condition can be safely assessed and treated via synchronous audio and visual telemedicine encounter.      Reason for Telemedicine Visit: Patient has requested telehealth visit    Originating Site (Patient Location): Patient's home    Distant Site (Provider Location): Provider Remote Setting- Home Office    Consent:  The patient/guardian has verbally consented to: the potential risks and benefits of telemedicine (video visit) versus in person care; bill my insurance or make self-payment for services provided; and responsibility for payment of non-covered services.     Patient would like the video invitation sent by:  My Chart    Mode of Communication:  [facility]    Distant Location (Provider):  Off-site    As the provider I attest to compliance with applicable laws and regulations related to telemedicine.       DATA:   Interactive Complexity: No   Crisis: No     Presenting Concerns/  Current Stressors:   Pt transferred from  Day Treatment. Continue OP to maintain reduced levels of Bipolar II symptoms. Pt reports feeling she successful completed her program and is not currently experiencing any depression or anxiety symptoms at this time. 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 Pt reports possible wanting limited therapy sessions going forward only to provide therapy sessions  on an as  needed basis. Therapist provided active, empathic, and reflective listening, validation, processing support and reframe to client in session.  Client was open to therapeutic content and open to feedback.      ASSESSMENT:  Mental Status Assessment:  Appearance:   Appropriate   Eye Contact:   Good   Psychomotor Behavior: Normal   Attitude:   Cooperative   Orientation:   All  Speech   Rate / Production: Normal/ Responsive   Volume:  Normal   Mood:    Calm, relaxed  Affect:    Appropriate   Thought Content:  Clear   Thought Form:  Coherent   Insight:    Good       Safety Issues and Plan for Safety and Risk Management:   [place] Suicide Severity Rating Scale (Lifetime/Recent)      8/31/2015     1:00 PM 3/27/2023    12:47 PM 3/27/2023     4:00 PM 3/29/2023    10:00 AM   [place] Suicide Severity Rating (Lifetime/Recent)   Q1 Wished to be Dead (Past Month)  yes  yes   Q2 Suicidal Thoughts (Past Month)  yes  yes   Q3 Suicidal Thought Method  no  no   Q4 Suicidal Intent without Specific Plan  no  no   Q5 Suicide Intent with Specific Plan  no  no   Q6 Suicide Behavior (Lifetime)  no  no   Level of Risk per Screen  low risk  low risk   Do you have guns available to you? No      Q1 Wish to be Dead (Lifetime)   N    Q2 Non-Specific Active Suicidal Thoughts (Lifetime)   N    Actual Attempt (Lifetime)   N    Has subject engaged in non-suicidal self-injurious behavior? (Lifetime)   N    Interrupted Attempts (Lifetime)   N    Aborted or Self-Interrupted Attempt (Lifetime)   N    Preparatory Acts or Behavior (Lifetime)   N    Calculated C-SSRS Risk Score (Lifetime/Recent)   No Risk Indicated      Patient denies current fears or concerns for personal safety.  Patient denies current or recent suicidal ideation or behaviors.  Patient denies current or recent homicidal ideation or behaviors.  Patient denies current or recent self injurious behavior or ideation.  Patient denies other safety concerns.  Recommended that patient call 911 or  go to the local ED should there be a change in any of these risk factors.  Patient reports there are no firearms in the house.     Diagnostic Criteria:  Generalized Anxiety Disorder  A. Excessive anxiety and worry about a number of events or activities (such as work or school performance).   B. The person finds it difficult to control the worry.  C. Select 3 or more symptoms (required for diagnosis). Only one item is required in children.   - Restlessness or feeling keyed up or on edge.    - Being easily fatigued.    - Difficulty concentrating or mind going blank.    - Irritability.    - Muscle tension.    - Sleep disturbance (difficulty falling or staying asleep, or restless unsatisfying sleep).   D. The focus of the anxiety and worry is not confined to features of an Axis I disorder.  E. The anxiety, worry, or physical symptoms cause clinically significant distress or impairment in social, occupational, or other important areas of functioning.   F. The disturbance is not due to the direct physiological effects of a substance (e.g., a drug of abuse, a medication) or a general medical condition (e.g., hyperthyroidism) and does not occur exclusively during a Mood Disorder, a Psychotic Disorder, or a Pervasive Developmental Disorder.  Bipolar II   **Must meet all criteria below for Dx of Bipolar II Disorder**   Current Hypomanic Symptoms includes:  History of Hypomania, symptoms included:  A. A distinct period of abnormally and persistently elevated, expansive, or irritable mood and abnormally and persistently increased activity or energy lasting at least 4 consecutive days and presentmost of the day, nearly every day.  B. During the period of mood disturbance and increased energy and activity, three (or more) of the following symptoms have persisted (four if the mood is only irritable), represent a nonticeable change from usual behaivor, and have been present to a degree:   - inflated self-esteem or grandiosity    -  decreased need for sleep (e.g., feels rested after only 3 hours of sleep)    - more talkative than usual or pressure to keep talking    - flight of ideas or subjective experience that thoughts are racing   - distractibility   - increase in goal-directed activity   - excessive involvement in activities that have a high potential for painful consequences  C. The episode is associated with an unequivocal change in functioning that is uncharacteristic of the person when not symptomatic  D. The disturbance in mood and the change in functioning are observable by others  E. The episode is not severe enough to cause marked impairment in social or occupational functioning or to necessitate hospitalization, and does not have psychotic features.  F. The symptoms are not attributable to the direct physiological effects of a substance or a general medical condition  Major Depressive Disorder  CRITERIA (A-C) REPRESENT A MAJOR DEPRESSIVE EPISODE - SELECT THESE CRITERIA  A) Recurrent episode(s) - symptoms have been present during the same 2-week period and represent a change from previous functioning 5 or more symptoms (required for diagnosis)   - Depressed mood. Note: In children and adolescents, can be irritable mood.     - Diminished interest or pleasure in all, or almost all, activities.    - Significant weight gainincrease in appetite.    - Decreased sleep.    - Fatigue or loss of energy.    - Feelings of worthlessness or inappropriate and excessive guilt.    - Diminished ability to think or concentrate, or indecisiveness.    - Recurrent thoughts of death (not just fear of dying), recurrent suicidal ideation without a specific plan, or a suicide attempt or a specific plan for committing suicide.   B) The symptoms cause clinically significant distress or impairment in social, occupational, or other important areas of functioning  C) The episode is not attributable to the physiological effects of a substance or to another  medical condition  D) The occurence of major depressive episode is not better explained by other thought / psychotic disorders    DSM5 Diagnoses: (Sustained by DSM5 Criteria Listed Above) As reported from DA completed 3.29.2023   Diagnoses: 296.89 Bipolar II Disorder Hypomanic  296.32 (F33.1) Major Depressive Disorder, Recurrent Episode, Moderate With melancholic features  300.02 (F41.1) Generalized Anxiety Disorder  Psychosocial & Contextual Factors: 49 yo, female, work stress  Intervention:   Educated on treatment planning and started identifying goals and interventions for treatment plan  Collateral Reports Completed:  Not Applicable      PLAN: (Homework, other):  1. Pt did not request to continue therapy at this time and canceled future appointments.    2. Provider recommended the following referrals: NA.      3.  Suicide Risk and Safety Concerns were assessed for Everette Mayen.    Patient meets the following risk assessment and triage: Patient denied any current/recent/lifetime history of suicidal ideation and/or behaviors.  No safety plan indicated at this time.  and No Risk Indicated      HUMBERTO MONTERROSO MA, Psychotherapist Trainee August 10, 2023    This note has been reviewed and I agree with the plan of care. This note is co-signed by Vani Mejia MA, [facility] Supervisor, on: 8/16/2023         Diagnostic Criteria:  Generalized Anxiety Disorder  A. Excessive anxiety and worry about a number of events or activities (such as work or school performance).   B. The person finds it difficult to control the worry.  C. Select 3 or more symptoms (required for diagnosis). Only one item is required in children.   - Restlessness or feeling keyed up or on edge.    - Being easily fatigued.    - Difficulty concentrating or mind going blank.    - Irritability.    - Muscle tension.    - Sleep disturbance (difficulty falling or staying asleep, or restless unsatisfying sleep).   D. The focus of the anxiety and worry is not  confined to features of an Axis I disorder.  E. The anxiety, worry, or physical symptoms cause clinically significant distress or impairment in social, occupational, or other important areas of functioning.   F. The disturbance is not due to the direct physiological effects of a substance (e.g., a drug of abuse, a medication) or a general medical condition (e.g., hyperthyroidism) and does not occur exclusively during a Mood Disorder, a Psychotic Disorder, or a Pervasive Developmental Disorder.  Bipolar II   **Must meet all criteria below for Dx of Bipolar II Disorder**   Current Hypomanic Symptoms includes:  History of Hypomania, symptoms included:  A. A distinct period of abnormally and persistently elevated, expansive, or irritable mood and abnormally and persistently increased activity or energy lasting at least 4 consecutive days and presentmost of the day, nearly every day.  B. During the period of mood disturbance and increased energy and activity, three (or more) of the following symptoms have persisted (four if the mood is only irritable), represent a nonticeable change from usual behaivor, and have been present to a degree:   - inflated self-esteem or grandiosity    - decreased need for sleep (e.g., feels rested after only 3 hours of sleep)    - more talkative than usual or pressure to keep talking    - flight of ideas or subjective experience that thoughts are racing   - distractibility   - increase in goal-directed activity   - excessive involvement in activities that have a high potential for painful consequences  C. The episode is associated with an unequivocal change in functioning that is uncharacteristic of the person when not symptomatic  D. The disturbance in mood and the change in functioning are observable by others  E. The episode is not severe enough to cause marked impairment in social or occupational functioning or to necessitate hospitalization, and does not have psychotic features.  F. The  symptoms are not attributable to the direct physiological effects of a substance or a general medical condition  Major Depressive Disorder  CRITERIA (A-C) REPRESENT A MAJOR DEPRESSIVE EPISODE - SELECT THESE CRITERIA  A) Recurrent episode(s) - symptoms have been present during the same 2-week period and represent a change from previous functioning 5 or more symptoms (required for diagnosis)   - Depressed mood. Note: In children and adolescents, can be irritable mood.     - Diminished interest or pleasure in all, or almost all, activities.    - Significant weight gainincrease in appetite.    - Decreased sleep.    - Fatigue or loss of energy.    - Feelings of worthlessness or inappropriate and excessive guilt.    - Diminished ability to think or concentrate, or indecisiveness.    - Recurrent thoughts of death (not just fear of dying), recurrent suicidal ideation without a specific plan, or a suicide attempt or a specific plan for committing suicide.   B) The symptoms cause clinically significant distress or impairment in social, occupational, or other important areas of functioning  C) The episode is not attributable to the physiological effects of a substance or to another medical condition  D) The occurence of major depressive episode is not better explained by other thought / psychotic disorders      DSM5 Diagnoses: (Sustained by DSM5 Criteria Listed Above)  Diagnoses: 296.89 Bipolar II Disorder Hypomanic  296.32 (F33.1) Major Depressive Disorder, Recurrent Episode, Moderate With melancholic features  300.02 (F41.1) Generalized Anxiety Disorder

## 2023-08-21 ENCOUNTER — TELEPHONE (OUTPATIENT)
Dept: PSYCHOLOGY | Facility: CLINIC | Age: 48
End: 2023-08-21
Payer: COMMERCIAL

## 2023-08-22 NOTE — TELEPHONE ENCOUNTER
Per [facility], called and left vm for pt in response to feedback provided. Requested a return call to discuss pt concerns.

## 2023-09-22 ENCOUNTER — HOSPITAL ENCOUNTER (OUTPATIENT)
Facility: CLINIC | Age: 48
End: 2023-09-22
Attending: INTERNAL MEDICINE | Admitting: INTERNAL MEDICINE
Payer: COMMERCIAL

## 2023-09-22 ENCOUNTER — TELEPHONE (OUTPATIENT)
Dept: GASTROENTEROLOGY | Facility: CLINIC | Age: 48
End: 2023-09-22
Payer: COMMERCIAL

## 2023-09-22 NOTE — TELEPHONE ENCOUNTER
"Endoscopy Scheduling Screen    Have you had a positive Covid test in the last 14 days?  No    Are you active on MyChart?   Yes    What insurance is in the chart?  Other:  [employer]    Ordering/Referring Provider: TAMIA FRAUSTO   (If ordering provider performs procedure, schedule with ordering provider unless otherwise instructed. )    BMI: Estimated body mass index is 27.07 kg/m  as calculated from the following:    Height as of 5/31/23: 1.575 m (5' 2\").    Weight as of 5/31/23: 67.1 kg (148 lb).     Sedation Ordered  moderate sedation.   If patient BMI > 50 do not schedule in ASC.    If patient BMI > 45 do not schedule at [facility].    Are you taking methadone or Suboxone?  No    Are you taking any prescription medications for pain 3 or more times per week?   No    Do you have a history of malignant hyperthermia or adverse reaction to anesthesia?  No    (Females) Are you currently pregnant?   No     Have you been diagnosed or told you have pulmonary hypertension?   No    Do you have an LVAD?  No    Have you been told you have moderate to severe sleep apnea?  No    Have you been told you have COPD, asthma, or any other lung disease?  No    Do you have any heart conditions?  No     Have you ever had an organ transplant?   No    Have you ever had or are you awaiting a heart or lung transplant?   No    Have you had a stroke or transient ischemic attack (TIA aka \"mini stroke\" in the last 6 months?   No    Have you been diagnosed with or been told you have cirrhosis of the liver?   No    Are you currently on dialysis?   No    Do you need assistance transferring?   No    BMI: Estimated body mass index is 27.07 kg/m  as calculated from the following:    Height as of 5/31/23: 1.575 m (5' 2\").    Weight as of 5/31/23: 67.1 kg (148 lb).     Is patients BMI > 40 and scheduling location [facility]?  No    Do you take an injectable medication for weight loss or diabetes (excluding insulin)?  No    Do you take the medication " Naltrexone?  No    Do you take blood thinners?  No       Prep   Are you currently on dialysis or do you have chronic kidney disease?  No    Do you have a diagnosis of diabetes?  No    Do you have a diagnosis of cystic fibrosis (CF)?  No    On a regular basis do you go 3 -5 days between bowel movements?  No    BMI > 40?  No    Preferred Pharmacy:    [id number] - [place], [place] - [address] [facility]  [address] [place] [address] [place] [address]  Phone: [phone] Fax: [phone]      Final Scheduling Details   Colonoscopy prep sent?  Standard MiraLAX    Procedure scheduled  Colonoscopy    Surgeon:  BANDAR     Date of procedure:  12/8/23     Pre-OP / PAC:   No - Not required for this site.    Location  [facility] - Per order.    Sedation   Moderate Sedation - Per order.      Patient Reminders:   You will receive a call from a Nurse to review instructions and health history.  This assessment must be completed prior to your procedure.  Failure to complete the Nurse assessment may result in the procedure being cancelled.      On the day of your procedure, please designate an adult(s) who can drive you home stay with you for the next 24 hours. The medicines used in the exam will make you sleepy. You will not be able to drive.      You cannot take public transportation, ride share services, or non-medical taxi service without a responsible caregiver.  Medical transport services are allowed with the requirement that a responsible caregiver will receive you at your destination.  We require that drivers and caregivers are confirmed prior to your procedure.

## 2023-11-27 ENCOUNTER — TELEPHONE (OUTPATIENT)
Dept: GASTROENTEROLOGY | Facility: CLINIC | Age: 48
End: 2023-11-27

## 2023-11-27 NOTE — TELEPHONE ENCOUNTER
Attempted to contact patient in order to complete pre assessment questions.     No answer. Left message to return call to [phone] option 4      Procedure details:    Patient scheduled for Colonoscopy  on 12.8.2023.     Arrival time: 0715. Procedure time 0800    Pre op exam needed? N/A    Facility location: [facility]; [facility] [address] [place] [address]    Sedation type: Conscious sedation - Discuss sedation d/t hx of bipolar dx    Indication for procedure: screening colonoscopy      Chart review:     Electronic implanted devices? No    Recent diagnosis of diverticulitis within the last 6 weeks? No    Diabetic? No    Diabetic medication HOLDING recommendations: (if applicable)  Oral diabetic medications: N/A  Diabetic injectables: N/A  Insulin: N/A      Medication review:    Anticoagulants? No    NSAIDS? No NSAID medications per patient's medication list.  RN will verify with pre-assessment call.    Other medication HOLDING recommendations:  N/A      Prep for procedure:     Bowel prep recommendation: Standard Miralax  Due to: standard bowel prep.    Prep instructions sent via [employer].     Leah Chacon RN  Endoscopy Procedure Pre Assessment RN

## 2023-11-29 ENCOUNTER — FCC EXTENDED DOCUMENTATION (OUTPATIENT)
Dept: PSYCHOLOGY | Facility: CLINIC | Age: 48
End: 2023-11-29
Payer: COMMERCIAL

## 2023-11-29 ENCOUNTER — TELEPHONE (OUTPATIENT)
Dept: GASTROENTEROLOGY | Facility: CLINIC | Age: 48
End: 2023-11-29
Payer: COMMERCIAL

## 2023-11-29 NOTE — PROGRESS NOTES
Discharge Summary  Single Session    Client Name: Everette Mayen MRN#: [id number] YOB: 1975      Intake / Discharge Date: 8.10.2023      DSM5 Diagnoses: (Sustained by DSM5 Criteria Listed Above)  Diagnoses: 296.89 Bipolar II Disorder Hypomanic  296.32 (F33.1) Major Depressive Disorder, Recurrent Episode, Moderate With melancholic features  300.02 (F41.1) Generalized Anxiety Disorder  Psychosocial & Contextual Factors: 49 yo, female, work stress             Presenting Concern:    Pt transferred from  [facility]. Continue OP to maintain reduced levels of Bipolar II symptoms. Pt reports feeling she successful completed her program and is not currently experiencing any depression or anxiety symptoms at this time. 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 Pt reports possible wanting limited therapy sessions going forward only to provide therapy sessions  on an as needed basis.       Reason for Discharge:  Client did not return.Due to no longer in my care, have not seen for 3 mos      Disposition at Time of Last Encounter:   Comments:   No SI and HI at last contact.     Risk Management:   Client has had a history of suicidal ideation: low risk  Recommended that patient call 911 or go to the local ED should there be a change in any of these risk factors.      Referred To:  PCP.  Client can return to [facility] at any point in the future if desired.        HUMBERTO MONTERROSO MA, Psychotherapist Trainee  11/29/2023

## 2023-11-29 NOTE — TELEPHONE ENCOUNTER
----- Message from Lucrecia Colby RN sent at 11/28/2023  4:47 PM CST -----  Regarding: Patient cancellation  Patient called to cancel, stated she was too scared to do the procedure.

## 2023-11-29 NOTE — TELEPHONE ENCOUNTER
Caller: RN spoke with patient  Reason for Reschedule/Cancellation (please be detailed, any staff messages or encounters to note?): Scared to do the procedure.       Prior to reschedule please review:  Ordering Provider: Marco Nunez  Sedation per order: Moderate  Does patient have any ASC Exclusions, please identify?: No      Notes on Cancelled Procedure:  Procedure: Lower Endoscopy [Colonoscopy]   Date: 12/8/2023  Location: [facility]; [facility] [place] [address] [address]  Surgeon: Long      Rescheduled: No, notified PCP to follow up.

## 2023-12-16 ENCOUNTER — OFFICE VISIT (OUTPATIENT)
Dept: URGENT CARE | Facility: URGENT CARE | Age: 48
End: 2023-12-16
Payer: COMMERCIAL

## 2023-12-16 VITALS
RESPIRATION RATE: 16 BRPM | SYSTOLIC BLOOD PRESSURE: 145 MMHG | DIASTOLIC BLOOD PRESSURE: 89 MMHG | WEIGHT: 153 LBS | TEMPERATURE: 97.9 F | BODY MASS INDEX: 27.98 KG/M2 | HEART RATE: 65 BPM | OXYGEN SATURATION: 99 %

## 2023-12-16 DIAGNOSIS — N39.0 ACUTE UTI: Primary | ICD-10-CM

## 2023-12-16 LAB
ALBUMIN UR-MCNC: NEGATIVE MG/DL
APPEARANCE UR: ABNORMAL
BACTERIA #/AREA URNS HPF: ABNORMAL /HPF
BILIRUB UR QL STRIP: NEGATIVE
CLUE CELLS: ABNORMAL
COLOR UR AUTO: YELLOW
GLUCOSE UR STRIP-MCNC: NEGATIVE MG/DL
HGB UR QL STRIP: ABNORMAL
KETONES UR STRIP-MCNC: NEGATIVE MG/DL
LEUKOCYTE ESTERASE UR QL STRIP: ABNORMAL
NITRATE UR QL: NEGATIVE
PH UR STRIP: 6 [PH] (ref 5–7)
RBC #/AREA URNS AUTO: ABNORMAL /HPF
SP GR UR STRIP: <=1.005 (ref 1–1.03)
SQUAMOUS #/AREA URNS AUTO: ABNORMAL /LPF
TRANS CELLS #/AREA URNS HPF: ABNORMAL /HPF
TRICHOMONAS, WET PREP: ABNORMAL
UROBILINOGEN UR STRIP-ACNC: 0.2 E.U./DL
WBC #/AREA URNS AUTO: >100 /HPF
WBC'S/HIGH POWER FIELD, WET PREP: ABNORMAL
YEAST, WET PREP: ABNORMAL

## 2023-12-16 PROCEDURE — 87186 SC STD MICRODIL/AGAR DIL: CPT | Performed by: EMERGENCY MEDICINE

## 2023-12-16 PROCEDURE — 87086 URINE CULTURE/COLONY COUNT: CPT | Performed by: EMERGENCY MEDICINE

## 2023-12-16 PROCEDURE — 87210 SMEAR WET MOUNT SALINE/INK: CPT | Performed by: EMERGENCY MEDICINE

## 2023-12-16 PROCEDURE — 99213 OFFICE O/P EST LOW 20 MIN: CPT | Performed by: EMERGENCY MEDICINE

## 2023-12-16 PROCEDURE — 81001 URINALYSIS AUTO W/SCOPE: CPT | Performed by: EMERGENCY MEDICINE

## 2023-12-16 RX ORDER — CEPHALEXIN 500 MG/1
500 CAPSULE ORAL 3 TIMES DAILY
Qty: 15 CAPSULE | Refills: 0 | Status: SHIPPED | OUTPATIENT
Start: 2023-12-16 | End: 2023-12-21

## 2023-12-16 NOTE — PROGRESS NOTES
Assessment & Plan     Diagnosis:    ICD-10-CM    1. Acute UTI  N39.0 UA Macroscopic with reflex to Microscopic and Culture - Clinic Collect     Wet prep - Clinic Collect     Urine Microscopic Exam     Urine Culture     cephALEXin (KEFLEX) 500 MG capsule          Medical Decision Making:  Everette Mayen is a 48 year old female who presents to clinic today for evaluation of urinary frequency, urgency and dysuria.     This clinically is consistent with a urinary tract infection.  Urinalysis confirms the infection.  There has been no fever, confusion, flank pain or significant abdominal pain.  The patient is not concerned about STDs and testing not indicated. There is no clinical evidence of pyelonephritis, appendicitis, colitis, diverticulitis or any intraabdominal catastrophe. The patient will be started on antibiotics for the infection. Go to the ER if increasing pain, vomiting, fever, or inability to tolerate the oral antibiotic.  Follow up with primary physician is indicated if not improving in 2-3 days.     Tushar Uribe PA-C  [facility]    Subjective     Everette Mayen is a 48 year old female who presents to clinic today for the following health issues:  Chief Complaint   Patient presents with    Urgent Care     Dysuria and Frequency onset this morning          HPI  Patient states that this morning she started experiencing a burning sensation, and frequency and urgency of urination. Patient denies any flank or back pain, fever, nausea, vomiting, diarrhea, inability to urinate, vaginal discharge/bleeding or concerns for STDS.     Review of Systems    See HPI    Objective      Vitals:    Patient Vitals for the past 24 hrs:   BP Temp Temp src Pulse Resp SpO2 Weight   12/16/23 1103 (!) 145/89 -- -- 65 -- -- --   12/16/23 1059 (!) 166/91 97.9  F (36.6  C) Oral 68 16 99 % 69.4 kg (153 lb)         Vital signs reviewed by: Tushar Uribe PA-C    Physical Exam   Constitutional: The patient  is oriented to person, place, and time. Alert and cooperative. No acute distress.  Mouth: Mucous membranes are moist.  Cardiovascular: Regular rate and rhythm.  Pulmonary/Chest: Effort normal. No respiratory distress.   GI: Abdomen is soft, non-tender and non-distended throughout.  No rebound or guarding. No McBurney point tenderness.   MSK: No CVA tenderness bilaterally.  Neurological: Alert and oriented x3.     Labs/Imaging:    Results for orders placed or performed in visit on 12/16/23   UA Macroscopic with reflex to Microscopic and Culture - Clinic Collect     Status: Abnormal    Specimen: Urine, Clean Catch   Result Value Ref Range    Color Urine Yellow Colorless, Straw, Light Yellow, Yellow    Appearance Urine Slightly Cloudy (A) Clear    Glucose Urine Negative Negative mg/dL    Bilirubin Urine Negative Negative    Ketones Urine Negative Negative mg/dL    Specific Gravity Urine <=1.005 1.003 - 1.035    Blood Urine Large (A) Negative    pH Urine 6.0 5.0 - 7.0    Protein Albumin Urine Negative Negative mg/dL    Urobilinogen Urine 0.2 0.2, 1.0 E.U./dL    Nitrite Urine Negative Negative    Leukocyte Esterase Urine Large (A) Negative   Urine Microscopic Exam     Status: Abnormal   Result Value Ref Range    Bacteria Urine Few (A) None Seen /HPF    RBC Urine 10-25 (A) 0-2 /HPF /HPF    WBC Urine >100 (A) 0-5 /HPF /HPF    Squamous Epithelials Urine Few (A) None Seen /LPF    Transitional Epithelials Urine Moderate (A) None Seen /HPF   Wet prep - Clinic Collect     Status: Abnormal    Specimen: Vagina; Swab   Result Value Ref Range    Trichomonas Absent Absent    Yeast Absent Absent    Clue Cells Absent Absent    WBCs/high power field 3+ (A) None         Tushar Uribe PA-C, December 16, 2023

## 2023-12-17 LAB — BACTERIA UR CULT: ABNORMAL

## 2024-01-02 ENCOUNTER — E-VISIT (OUTPATIENT)
Dept: FAMILY MEDICINE | Facility: CLINIC | Age: 49
End: 2024-01-02
Payer: COMMERCIAL

## 2024-01-02 ENCOUNTER — MYC MEDICAL ADVICE (OUTPATIENT)
Dept: FAMILY MEDICINE | Facility: CLINIC | Age: 49
End: 2024-01-02

## 2024-01-02 DIAGNOSIS — N30.00 ACUTE CYSTITIS WITHOUT HEMATURIA: Primary | ICD-10-CM

## 2024-01-02 DIAGNOSIS — N39.0 ACUTE UTI (URINARY TRACT INFECTION): ICD-10-CM

## 2024-01-02 PROCEDURE — 99421 OL DIG E/M SVC 5-10 MIN: CPT | Performed by: INTERNAL MEDICINE

## 2024-01-02 RX ORDER — CEPHALEXIN 500 MG/1
500 CAPSULE ORAL 2 TIMES DAILY
Qty: 10 CAPSULE | Refills: 0 | Status: SHIPPED | OUTPATIENT
Start: 2024-01-02 | End: 2024-02-14

## 2024-01-02 NOTE — TELEPHONE ENCOUNTER
Patient states that she was treated for UTI on 12/16 at . States that antibiotic worked well. States that she now again has minor symptoms.   Advised E visit. Patient agrees with plan. Allie Perez RN

## 2024-01-22 ENCOUNTER — OFFICE VISIT (OUTPATIENT)
Dept: FAMILY MEDICINE | Facility: CLINIC | Age: 49
End: 2024-01-22
Payer: COMMERCIAL

## 2024-01-22 VITALS
BODY MASS INDEX: 27.51 KG/M2 | SYSTOLIC BLOOD PRESSURE: 118 MMHG | HEART RATE: 64 BPM | DIASTOLIC BLOOD PRESSURE: 78 MMHG | TEMPERATURE: 98.4 F | WEIGHT: 149.5 LBS | OXYGEN SATURATION: 96 % | RESPIRATION RATE: 12 BRPM | HEIGHT: 62 IN

## 2024-01-22 DIAGNOSIS — Z23 NEED FOR HEPATITIS A IMMUNIZATION: ICD-10-CM

## 2024-01-22 DIAGNOSIS — Z23 NEED FOR IMMUNIZATION AGAINST TYPHOID: ICD-10-CM

## 2024-01-22 DIAGNOSIS — Z71.84 ENCOUNTER FOR COUNSELING FOR TRAVEL: Primary | ICD-10-CM

## 2024-01-22 PROCEDURE — 90691 TYPHOID VACCINE IM: CPT | Mod: GA | Performed by: PHYSICIAN ASSISTANT

## 2024-01-22 PROCEDURE — 99401 PREV MED CNSL INDIV APPRX 15: CPT | Mod: 25 | Performed by: PHYSICIAN ASSISTANT

## 2024-01-22 PROCEDURE — 90472 IMMUNIZATION ADMIN EACH ADD: CPT | Mod: GA | Performed by: PHYSICIAN ASSISTANT

## 2024-01-22 PROCEDURE — 90471 IMMUNIZATION ADMIN: CPT | Mod: GA | Performed by: PHYSICIAN ASSISTANT

## 2024-01-22 PROCEDURE — 90632 HEPA VACCINE ADULT IM: CPT | Mod: GA | Performed by: PHYSICIAN ASSISTANT

## 2024-01-22 RX ORDER — AZITHROMYCIN 500 MG/1
TABLET, FILM COATED ORAL
Qty: 3 TABLET | Refills: 0 | Status: SHIPPED | OUTPATIENT
Start: 2024-01-22 | End: 2024-02-14

## 2024-01-22 NOTE — PROGRESS NOTES
SUBJECTIVE: Everette King Javon Mayen , a 48 year old  female, presents for counseling and information regarding upcoming travel to [facility]. Special medical concerns include: none. She anticipates the following unusual exposures: none.    Itinerary:  [facility]    Departure Date: 2/3/24 Return date: 2/10/24    Reason for travel (i.e. Business, pleasure): business    Visiting an urban or rural area?: urban    Accommodations (i.e. hotel, hostel, friends, family, etc): hotel    Women - First day of your last period: n/a    IMMUNIZATION HISTORY  Have you received any vaccinations in the past 4 weeks?  No  Have you ever fainted from having your blood drawn or from an injection?  No  Have you ever had a fever reaction to vaccination?  No  Have you ever had any bad reaction or side effect from any vaccination?  No  Have you ever had hepatitis A or B vaccine?  yes  Do you live (or work closely) with anyone who has AIDS, an AIDS-like condition, any other immune disorder or who is on chemotherapy for cancer?  No  Have you received any injection of immune globulin or any blood products during the past 12 months?  No    GENERAL MEDICAL HISTORY  Do you have a medical condition that warrants maintenance medication or physician follow-up?  yes  Do you have a medical condition that is stable now, but that may recur while traveling?  yes  Has your spleen been removed?  No  Have you had an acute illness or a fever in the past 48 hours?  yes  Are you pregnant, or might you become pregnant on this trip?  Any chance of pregnancy?  No  Are you breastfeeding?  No  Do you have HIV, AIDS, an AIDS-like condition, any other immune disorder, leukemia or cancer?  No  Do you have a severe combined immunodeficiency disease?  No  Have you had your thymus gland removed or history of problems with your thymus, such as myasthenia gravis, DiGeorge syndrome, or thymoma?  No    Do you have severe thrombocytopenia (low platelet count) or a coagulation  disorder?  No  Have you ever had a convulsion, seizure, epilepsy, neurologic condition or brain infection?  No  Do you have any stomach conditions?  No  Do you have a G6PD deficiency?  No  Do you have severe renal or kidney impairment?  No  Do you have a history of psychiatric problems?  yes  Do you have a problem with strange dreams and/or nightmares?  No  Do you have insomnia?  yes  Do you have problems with vaginitis?  No  Do you have psoriasis?  yes  Are you prone to motion sickness?  No  Have you ever had headaches, nausea, vomiting, or breathing problems from altitude exposure?  No      Past Medical History:   Diagnosis Date    Allergic rhinitis, cause unspecified     Depressive disorder, not elsewhere classified     Hypertension 01/01/2008    Other psoriasis       Immunization History   Administered Date(s) Administered    COVID-19 12+ (2023-24) (MODERNA) 09/30/2023    COVID-19 Bivalent 18+ (Moderna) 11/22/2022    COVID-19 Monovalent 18+ (Moderna) 03/23/2021, 04/20/2021, 12/01/2021    Flu, Unspecified 12/18/2008, 11/20/2009    Influenza (IIV3) PF 12/18/2008, 11/04/2013, 11/04/2014    Influenza Vaccine >6 months,quad, PF 10/22/2019, 10/27/2020, 12/28/2021, 11/22/2022    Influenza, seasonal, injectable, PF 11/20/2009    TD,PF 7+ (Tenivac) 01/20/2018    TDAP Vaccine (Adacel) 10/30/2007    Td, Absorbed, Pf, Adult, Lf Unspecified 01/20/2018       Current Outpatient Medications   Medication Sig Dispense Refill    azithromycin (ZITHROMAX) 500 MG tablet Take one tablet daily for up to 3 days as needed for traveler's diarrhea 3 tablet 0    adalimumab (HUMIRA) 40 MG/0.8ML prefilled syringe kit Inject 0.8 mLs (40 mg) Subcutaneous every 14 days 2 each 5    amLODIPine-benazepril (LOTREL) 5-10 MG capsule Take 1 capsule by mouth daily 90 capsule 3    cephALEXin (KEFLEX) 500 MG capsule Take 1 capsule (500 mg) by mouth 2 times daily 10 capsule 0    clobetasol (TEMOVATE) 0.05 % external ointment Apply topically 2 times daily  as needed (Psoriasis) 60 g 11    lurasidone (LATUDA) 20 MG TABS tablet Take 1 tablet (20 mg) by mouth daily with food 90 tablet 3    traZODone (DESYREL) 50 MG tablet Take 1 tablet (50 mg) by mouth At Bedtime 90 tablet 3     Allergies   Allergen Reactions    Sulfa Antibiotics Hives     Lupus Erythematous    Sulfacetamide Hives and Itching     Lupus Erythematous    Lamotrigine Rash    Lamotrigine Rash        EXAM: deferred    Immunizations discussed include: Hepatitis A and Typhoid  Malaria prophylaxis recommended: not needed  Symptomatic treatment for traveler's diarrhea: bismuth subsalicylate, loperamide/diphenoxylate, and azithromycin    ASSESSMENT/PLAN:    (Z71.84) Encounter for counseling for travel  (primary encounter diagnosis)    Comment: Hepatitis A and typhoid vaccines today. Patient will return or follow-up with PCP as needed. Prophylaxis given for Traveler's diarrhea and is not needed for Malaria. All questions were answered.     Plan: azithromycin (ZITHROMAX) 500 MG tablet            (Z23) Need for hepatitis A immunization  Comment:   Plan: HEPATITIS A 19+ (HAVRIX/VAQTA)            (Z23) Need for immunization against typhoid  Comment:   Plan: TYPHOID VACCINE, IM              I have reviewed general recommendations for safe travel   including: food/water precautions, insect avoidance, safe sex   practices given high prevalence of HIV and other STDs,   roadway safety. Educational materials and links to the CDC   Traveler's health website have been provided.    Total time 15 minutes, greater than 50 percent in counseling   and coordination of care.

## 2024-01-22 NOTE — NURSING NOTE
Prior to immunization administration, verified patients identity using patient s name and date of birth. Please see Immunization Activity for additional information.     Screening Questionnaire for Adult Immunization    Are you sick today?   Yes   Do you have allergies to medications, food, a vaccine component or latex?   Yes   Have you ever had a serious reaction after receiving a vaccination?   No   Do you have a long-term health problem with heart, lung, kidney, or metabolic disease (e.g., diabetes), asthma, a blood disorder, no spleen, complement component deficiency, a cochlear implant, or a spinal fluid leak?  Are you on long-term aspirin therapy?   No   Do you have cancer, leukemia, HIV/AIDS, or any other immune system problem?   No   Do you have a parent, brother, or sister with an immune system problem?   No   In the past 3 months, have you taken medications that affect  your immune system, such as prednisone, other steroids, or anticancer drugs; drugs for the treatment of rheumatoid arthritis, Crohn s disease, or psoriasis; or have you had radiation treatments?   Yes   Have you had a seizure, or a brain or other nervous system problem?   No   During the past year, have you received a transfusion of blood or blood    products, or been given immune (gamma) globulin or antiviral drug?   No   For women: Are you pregnant or is there a chance you could become       pregnant during the next month?   No   Have you received any vaccinations in the past 4 weeks?   No     Immunization questionnaire was positive for at least one answer.  Notified Maxi Quiles PA-C.      Patient instructed to remain in clinic for 15 minutes afterwards, and to report any adverse reactions.     Screening performed by Carolee Medeiros MA on 1/22/2024 at 1:27 PM.

## 2024-01-23 ENCOUNTER — OFFICE VISIT (OUTPATIENT)
Dept: URGENT CARE | Facility: URGENT CARE | Age: 49
End: 2024-01-23
Payer: COMMERCIAL

## 2024-01-23 VITALS
SYSTOLIC BLOOD PRESSURE: 141 MMHG | HEART RATE: 76 BPM | TEMPERATURE: 98.7 F | DIASTOLIC BLOOD PRESSURE: 89 MMHG | OXYGEN SATURATION: 98 %

## 2024-01-23 DIAGNOSIS — R39.89 URINARY PROBLEM: ICD-10-CM

## 2024-01-23 DIAGNOSIS — N39.0 RECURRENT UTI (URINARY TRACT INFECTION): Primary | ICD-10-CM

## 2024-01-23 LAB
ALBUMIN UR-MCNC: 100 MG/DL
APPEARANCE UR: CLEAR
BACTERIA #/AREA URNS HPF: ABNORMAL /HPF
BILIRUB UR QL STRIP: NEGATIVE
COLOR UR AUTO: YELLOW
GLUCOSE UR STRIP-MCNC: NEGATIVE MG/DL
HGB UR QL STRIP: ABNORMAL
KETONES UR STRIP-MCNC: ABNORMAL MG/DL
LEUKOCYTE ESTERASE UR QL STRIP: ABNORMAL
NITRATE UR QL: NEGATIVE
PH UR STRIP: 6.5 [PH] (ref 5–7)
RBC #/AREA URNS AUTO: ABNORMAL /HPF
SP GR UR STRIP: >=1.03 (ref 1–1.03)
SQUAMOUS #/AREA URNS AUTO: ABNORMAL /LPF
UROBILINOGEN UR STRIP-ACNC: 1 E.U./DL
WBC #/AREA URNS AUTO: ABNORMAL /HPF

## 2024-01-23 PROCEDURE — 81001 URINALYSIS AUTO W/SCOPE: CPT

## 2024-01-23 PROCEDURE — 99213 OFFICE O/P EST LOW 20 MIN: CPT

## 2024-01-23 PROCEDURE — 87086 URINE CULTURE/COLONY COUNT: CPT

## 2024-01-23 RX ORDER — CEPHALEXIN 500 MG/1
500 CAPSULE ORAL 3 TIMES DAILY
Qty: 15 CAPSULE | Refills: 0 | Status: SHIPPED | OUTPATIENT
Start: 2024-01-23 | End: 2024-01-28

## 2024-01-23 NOTE — PATIENT INSTRUCTIONS
Recommend taking cephalexin 3x/day for 5 days. Continue antibiotic for full course even if symptoms have resolved.     You can also try over the counter Azo and/or cranberry supplements for your urinary symptoms.      Get plenty of rest and drink fluids.  Prevention of UTI is key.     Go to ER if any worsening symptoms or signs/symptoms such as abdominal pain, blood in urine, fever, chills, pain over kidneys.

## 2024-01-23 NOTE — PROGRESS NOTES
ASSESSMENT:   (N39.0) Recurrent UTI (urinary tract infection)  (primary encounter diagnosis)  Plan: cephALEXin (KEFLEX) 500 MG capsule    (R39.89) Urinary problem  Plan: UA Macroscopic with reflex to Microscopic and         Culture - Clinic Collect, UA Microscopic with         Reflex to Culture, Urine Culture    PLAN:  Recommend taking cephalexin 3x/day for 5 days. We discussed to continue antibiotic for full course even if symptoms have resolved.   Can also try over the counter Azo and/or cranberry supplements for your urinary symptoms.    Get plenty of rest and drink fluids.  Informed the patient that prevention of UTI is key. Education provided about UTI in the patient instructions.   We discussed to go to the ER for worsening symptoms such as abdominal pain, blood in urine, fever, chills, pain over kidneys.     Patient acknowledged understanding of the above plan.     Antonella Costa, JONNA Student on 1/23/2024 at 6:02 PM    Physician Attestation   I agree with the information in this note.    Ross Menendez, APRN CNP     SUBJECTIVE:   Everette Mayen is a 48 year old female who  presents today for a possible UTI. Symptoms of dysuria, urgency, frequency, burning, and chills have been going on for 2hour(s).  Hematuria no.  Sudden onset and severe. Denies nausea or vomiting.This patient does have a history of urinary tract infections. Patient denies vaginal symptoms, pregnancy.      ROS:   Negative except noted above.    OBJECTIVE:  BP (!) 141/89 (BP Location: Left arm, Patient Position: Sitting, Cuff Size: Adult Regular)   Pulse 76   Temp 98.7  F (37.1  C) (Tympanic)   LMP  (Approximate)   SpO2 98%   GENERAL APPEARANCE: healthy, alert and no distress  RESP: lungs clear to auscultation - no rales, rhonchi or wheezes  CV: regular rates and rhythm, normal S1 S2, no murmur noted  ABDOMEN:  soft, nontender, no HSM or masses and bowel sounds normal  BACK: No CVA tenderness  SKIN: no suspicious lesions or  rashes    UA: positive for WBC's, bacturia, ketones, protein, leukocytes and positive for RBC's

## 2024-01-23 NOTE — PATIENT INSTRUCTIONS
"See travel packet provided  Recommend ultrathon (mosquito repellant), pepto bismol and imodium  The food and drink choices you make while traveling can impact your likelihood of getting sick.   If you aren't sure if a food or drink is safe, the saying \" BOIL IT, COOK IT, PEEL IT, OR FORGET IT\" can help you decide whether it's okay to consume.   Also bring hand  and sun screen with you.  Safe Travels       Today January 22, 2024 you received the    Hepatitis A Vaccine - Please return on 7/22/24 or later for your 2nd and final dose.    Typhoid - injectable. This vaccine is valid for two years. .    These appointments can be made as a NURSE ONLY visit.    **It is very important for the vaccinations to be given on the scheduled day(s), this helps ensure you receive the full effectiveness of the vaccine.**    Please call [facility] with any questions [phone]    Thank you for visiting [facility]      Trav Traveler Report  Itinerary  Round Trip: [place] ? [place]  Health Concerns Summary  The following may pose a risk or require preventive measures based on this itinerary. See the report sections below for details.  Vaccine-Preventable Diseases: COVID-19, hepatitis A, hepatitis B, influenza, measles, mumps, rubella, rabies, typhoid fever, yellow fever  Malaria  Other Diseases: anthrax disease, bartonellosis, Chagas' disease (American trypanosomiasis), chikungunya, dengue, helminths, leishmaniasis, leptospirosis, plague, sexually transmitted infections, travelers' diarrhea, tuberculosis, West Nile virus, Zika  Yellow Fever  Requirement Information (for entry, per WHO)  Is yellow fever vaccine an official entry requirement for this itinerary?  NO. An official certificate showing vaccination is not required for entry by any country on the entered itinerary sequence, but view full details and see \"YF Requirement Table\" if there are additional " "transited countries.    Visa application: Proof of YF vaccination may be required for certain visa applicants. Travelers should contact the appropriate embassy or consulate with questions and, if it is required for their visa, carry the YF certificate with their passport on the day of travel.    Yellow Fever Requirement Table for this Itinerary ?The following values result in the \"NO\" requirement result shown above (based on a round trip with [place] as the home country):   Yellow Fever Requirement Table  [place]  No  None  None     [place]  Yes  List  ? 1 year  1, 2        Note 1: Additional conditions pertain for this country's requirement. Please refer to the Individual Country Requirements information presented below.   Note 2: Airport transit stops (no exit through immigration checkpoint) in a \"Required if Coming From\" country may impact the YF requirement. Please refer to the Individual Country Requirements information presented below to review this country's requirement and evaluate whether a traveler's transit stops may change the YF requirement result.   Individual Country Requirements  Effective July 11, 2016, the ICVP for yellow fever vaccination will be valid for life, and this validity applies to existing and new certificates for the purposes of international travel. Revaccination or a booster dose of YF vaccine cannot be required of international travelers as a condition of entry into any country regardless of the issued ICVP date; validity begins 10 days after the date of vaccination. On new ICVPs, \"life of person vaccinated\" should be entered in the validity space on the certificate. Whether recognition of the new lifetime validity regulation by personnel at the point of entry in countries with previous 10-year validity policies will occur immediately is uncertain.  [place]  A certificate proving yellow fever vaccination is required for travelers aged ? 1 year coming from [place], " [place], and [place]. This also applies to airport transit stops (no exit through immigration checkpoint) longer than 12 hours in these countries.  Recommendation Information (for health protection)  Is yellow fever vaccine a recommended protective measure for this itinerary?  YES. Vaccination is recommended for travel to areas of one or more countries on this itinerary.  Individual Country Recommendations  [facility]  Recommended for travelers aged ? 9 months: [facility] and areas east of the [facility] below 2,300 m (7,500 ft) (see map). Insect precautions are essential for unvaccinated travelers.  Generally not recommended (except for highly risk-averse travelers and long-stay travelers): itineraries limited to areas west of the [place] below 2,300 m except [facility] (see map). No human cases of YF have ever been reported from these areas, and data analysis by WHO indicates extremely low potential for YF virus exposure. Travelers aged ? 60 years going to these areas should not be vaccinated.  Not recommended: itineraries limited to the cities of [facility], [facility], [facility], or \[facility]\""; the [facility]; areas above 2,300 m; or the [facility].  Travel Vaccination Recommendations  COVID-19  Recommendation (for health protection)  [facility]  Risk exists from November through January and May through August (although off-season transmission can occur, occasionally with significant spikes).  Recommended for: all travelers aged ? 6 months.  Vaccination Considerations  [facility]  Travelers not already vaccinated with the currently available vaccine formulation should be vaccinated. Consider nirmatrelvir-ritonavir (Paxlovid) as standby self-administered therapy, especially for those who are at high risk for complications from COVID-19 or who are not adequately vaccinated and may not be able to attain rapid, robust medical evaluation and advanced care when traveling. However,  jurisdictions may differ in the degree to which the medication is available for such use. Also, the medication's ritonavir subcomponent has several drug-drug interactions that must be evaluated in each patient.  Hepatitis A  [place]  Recommended for: all travelers.  Typhoid fever  [facility]  Recommended for: most travelers, especially those with adventurous dietary habits; those without consistent access to safe food and water; those with prolonged stays; and those traveling outside common tourist packages and other prearranged fixed itineraries, especially in rural areas.  Consider for: all risk-averse travelers desiring maximum pretravel preparation.  Influenza  [facility]  Risk exists from December through January and [place] through August, although off-season transmission can occur.  Recommended for: all travelers during transmission season due to demonstrated influenza risk in this group.  Vaccination Considerations  [facility]  Travelers not already immunized with the currently available vaccine formulation should be vaccinated. Travelers immunized with the current formulation more than 6 months earlier should consider revaccination because immunity may have declined. Consider baloxavir or oseltamivir as standby therapy, especially for those who are at high risk for complications from influenza or inadequately vaccinated.  Hepatitis B  [facility]  High risk exists in the [place] region, especially in [facility] and [facility], with low risk throughout the rest of the country.  Recommended for: all health care workers; travelers with possible contact with contaminated needles (e.g., from acupuncture, tattooing, or injection-drug use) or possible sexual contact with a new partner during the stay.  For travel to the high-risk [place] region, also recommended for: adventure travelers; travelers with high potential to seek medical or dental care in local facilities; those with prolonged stays; those with frequent short stays  in this region or high or intermediate risk countries.  Consider for: risk-averse travelers with short stays going to the high-risk [place] region desiring maximum pretravel preparation.  Travelers should observe safer-sex practices and blood/bodily fluid precautions.  Measles, mumps, rubella  [facility]  Indicated for those born in 1957 or later (1970 or later in [place] and [place]; 1966 or later in [place]) without evidence of immunity or of 2 countable doses of live vaccine at any time during their lives. Also indicated for those born before 1970 (in [place]) without evidence of immunity or previous vaccination with 1 countable dose of measles-containing vaccine.  Rabies  [facility]  Preexposure vaccination:  Risk from wildlife and domestic animals exists throughout the country. Canine rabies rarely occurs.  Recommended for: animal workers (such as veterinarians and wildlife professionals); all travelers likely to have contact with bats.  Postexposure prophylaxis considerations:  Dog, other terrestrial mammal, and bat bites or scratches should be taken seriously, and postexposure prophylaxis should be sought even by those already vaccinated.  Preexposure vaccination for the [facility] only:  Risk from bats exists and is presumed to have widespread distribution. Rabies is not present in canines or other mammals.  Recommended for: all travelers likely to have contact with bats.  Postexposure prophylaxis considerations for the [place] only:  Bat bites or scratches should be taken seriously, and postexposure prophylaxis should be sought even by those already vaccinated.  Routine Vaccination Recommendations (adults only)  Tetanus, diphtheria, pertussis  Due to increasingly frequent pertussis outbreaks worldwide, all travelers should receive Tdap vaccine every 10 years, assuming they previously received an adequate primary series. Those who received Td or TT for their most recent booster should receive an  immediate dose of Tdap, regardless of the interval since the last tetanus dose.  Pneumococcal  Recommended for adults aged ? 65 years and all adults with chronic disease or immunocompromising conditions.  Varicella  Indicated for all persons born outside the [place] or born in the [place] in or after 1980, except for persons with an adequate vaccination history (2 lifetime doses), reliable evidence of previous infection, or laboratory confirmation of immunity.  Malaria  Malaria General Information  Count includes the [facility]  General malaria information: predominantly P. vivax. Transmission occurs throughout the year.  Malaria Recommendations  Count includes the [facility]  Location-specific recommendations:  Chemoprophylaxis is recommended for all travelers: elevations below 1,500 m (4,900 ft) in most cantons east of the [place] and in certain cantons of [facility], [facility], [facility], [facility], [place], and [facility] provinces; all cities and towns within these areas.  Chemoprophylaxis is recommended for certain travelers (see Issues to Consider box): elevations below 1,500 m in certain cantons east of the [place] and in certain cantons of [facility], [facility], [facility], and [facility] provinces; all cities and towns within these areas.  Insect precautions only are recommended (negligible transmission is reported): elevations below 1,500 m in rural areas of most other cantons west of the [place] not mentioned above; elevations below 1,500 m in rural areas of certain cantons of [facility], [facility], [facility], and [facility] provinces.  No preventive measures are necessary (no evidence of transmission exists): the cities of [facility], and [place]; the [facility]; [facility] and [facility]; elevations above 1,500 m; all other areas not mentioned above.  Malaria Prophylaxis  Drug choice depends on personal factors discussed between the traveler and medical provider. No preventive measure is 100% effective. Immediate medical attention is necessary for fever or  influenza-like illness within 3 months after travel in a malaria risk area. Include mention of travel history.  [facility]  Preventive measures: Travelers should observe insect precautions in areas with any level of transmission. Atovaquone-proguanil, doxycycline, mefloquine, and tafenoquine are protective in this country. G6PD testing is required prior to tafenoquine use.   Issues to Consider  Adventure travel  Risk-averse and vulnerable travelers  Areas subject to infrequent epidemics  Immigrants visiting friends and relatives  Flexible itineraries  Travel longer than 1 month  Unreliable medical expertise and/or treatment drugs at destination  Air-conditioned hotels only  Urban areas only  Non-transmission season  Minimal outdoor exposure  Travel shorter than 3 days      Travelers' Diarrhea  [facility]  High risk exists throughout the country, with moderate risk in deluxe accommodations. Community sanitation and food safety measures are generally inadequate. Some itineraries (e.g., remote destinations, austere accommodations) and activities (e.g., ecotourism, eating street or local-market food) further increase risk.  Travelers should observe food and beverage precautions, which reduce the likelihood of illness.  In addition to maintaining fluid status with clean, safe water, travelers should maintain electrolyte status if they experience diarrhea and carry loperamide for self-treatment of watery (noninflammatory) diarrhea plus single-dose/short-course azithromycin (alternatively, a quinolone antibiotic) for use in combination with loperamide if functional capacity is impacted. Travelers with dysenteric presentation (or high fever) should use antibiotics (azithromycin, 3-day course) alone and avoid loperamide.  Current Safety Bulletins    [facility]  State of Emergency    Updated Jan 11, 2024 (Posted Jan 9, 2024)  A nationwide state of emergency and curfew (11:00 p.m. to 5:00 a.m.) are in place until March 8, 2024. The  state of emergency/curfew was declared in response to riots in the skilled nursing system. Explosions and other violent acts have occurred in various cities since January 8, 2024. During curfew hours, travelers must remain indoors except for travel to or from the airport for scheduled commercial flights (with identification and flight information readily available because authorities may stop travelers). Disruptions to air travel are possible. Travelers should maintain a high level of security awareness, carry a fully charged communication device, follow the advice of local authorities (including on how to handle health care emergencies), and monitor the situation through local media and embassy communications.  Other Concerns  Altitude illness  [place]  Chemoprophylaxis with acetazolamide should be considered for travelers anticipating rapid ascent to sleeping altitudes above 2,800 m (9,200 ft). The elevation of [facility] is 2,850 m (9,400 ft). Climbers summiting [place] will reach an elevation of 5,900 m (19,300 ft).  Dengue  [place]  Risk exists in urban and rural areas throughout the country at elevations below 2,300 m (7,500 ft), including on the [place], especially in the western provinces of [facility] and [facility]. No risk exists in [facility]. Transmission occurs throughout the year, especially during the rainy season, with highest activity from January through September. Travelers should observe daytime insect precautions.  Chikungunya  [place]  Low risk exists in urban and rural areas throughout the country at elevations below 2,300 m (7,500 ft), especially in the western provinces (including [facility]). Transmission occurs throughout the year, with highest activity from January through July. Travelers should observe daytime insect precautions.  Zika  [place]  Risk exists, with no evidence of current transmission, and is presumed to have widespread distribution at elevations below 2,300 m (7,500 ft). Pregnant  women (in any trimester) from nonaffected areas should receive informed counseling and consider postponing nonessential travel to this country (including the [place]). Travelers, especially pregnant women, should observe daytime insect precautions.  Marine hazards  [facility]  Risk from jellyfish exists, including highly venomous bluebottle jellyfish. Travelers wading, launching boats, or fishing are especially at risk.  Risk from stonefish and sea urchins exists. Risk from coral is limited to [facility] [place]. Travelers should seek out and heed posted warnings and refrain from bathing at unmarked, unpatrolled beaches.  Tuberculosis  [facility]  Tuberculosis (TB) is common in all developing countries and presents risk in certain developed countries. TB incidence in this country is 25 to 100 cases per 100,000 population (not the highest risk category).  A documented interferon gamma release assay is recommended both before departure and 2 to 3 months after return for stays longer than 1 month for health care workers and those with anticipated exposure in prisons, homeless shelters, refugee camps, or [facility]. All travelers should also be individually assessed for risk before and after return. If necessary, a tuberculin skin test can be used as an alternative.  Travelers should avoid public transportation and people who are coughing in crowded public places (whenever possible). Domestic household workers should be screened for TB.  Leishmaniasis  [facility]  Risk of cutaneous and mucosal disease exists throughout the country at elevations below 3,000 m (9,800 ft), especially in northern and [place] provinces. No risk exists on the [facility] [place]. Transmission occurs throughout the year. Travelers should observe insect precautions, especially from dusk to parth (including use of bed nets) and in shaded areas throughout the day.  Air pollution  [facility]  Air quality may be variable throughout the year. Annual  mean particulate matter concentrations are unhealthy in select cities.  [facility]: When air quality worsens, travelers should reduce prolonged or heavy outdoor exertion; those with lung disease or at the extremes of age should avoid prolonged or heavy outdoor exertion.  [place], or [place]: When air quality worsens, travelers with lung disease or at the extremes of age should reduce prolonged or heavy outdoor exertion.  Snakebites  [facility]  Risk of envenomation exists in areas with dense vegetation or rock formations (especially in warm weather when snakes tend to be more active). Most snakebites result from startling snakes; do not disturb or handle snakes. Boots and long pants are recommended in high-threat situations. Urgent medical care is indicated after any snakebite.  Sexually transmitted infections  [facility]  HIV is estimated to be present in 1% of sex workers. Travelers should be clearly informed of STI concepts and risks for HIV transmission. Travelers expected to engage in very high-risk behaviors should consider short-term preexposure prophylaxis (PrEP) with Truvada or other approved PrEP medication.  West Nile virus  [place]  Negligible risk may exist, but current epidemiologic data are unavailable.  Chagas' disease (American trypanosomiasis)  [place]  Risk to travelers is unknown but is presumed to be low in rural areas throughout the country. Travelers should avoid overnight stays in houses constructed of mud, adobe brick, or palm thatch.  Mayaro virus  [place]  Low risk exists and is limited to the western provinces of [facility], [facility], [facility], and [facility]. Travelers should observe daytime insect precautions.  Leptospirosis  [place]  Risk exists throughout the country, especially in [facility], [facility], [facility], [facility], and [facility] provinces. Transmission occurs throughout the year. Travelers should wear appropriate footwear; avoid  exposure to potentially contaminated floodwaters, fresh water, wet soil, and mud; avoid contact with rodents (including their excreta); and consider preexposure prophylaxis with doxycycline (200 mg once per week).  Plague  [facility]  Low risk exists and is limited to [facility], [place], and [facility]. Transmission occurs throughout the year. Travelers should avoid contact with potentially infected rodents and their fleas.  Bartonellosis  [facility]  Risk exists mainly in [facility], [facility], and [facility] and less commonly in [facility]. Risk to travelers is low. Insect precautions are recommended.  Helminths  [facility]  Low risk exists for soil-transmitted helminths in urban and rural areas and is presumed to have widespread distribution. Travelers should follow strict food and beverage precautions and wear appropriate footwear.  Anthrax disease  [facility]  Negligible risk exists throughout the country. Travelers should avoid direct or indirect contact with animal carcasses or hides.  Additional Information by Country  [facility]  Medical Summary  General Information  [facility] is a developing nation classified as upper middle income. Located in western [place] along the [place] (north of [place] and south of [place]), the climate is classified as humid equatorial (long dry season) along the southern coast and humid equatorial (no dry season) along the northern coast, with cooler temperatures inland in some high-altitude areas.  Medical Care  Adequate private medical care that meets most international standards is available in [facility]. Highly specialized cases or complex emergencies will require evacuation. The [place] is a frequent destination. Medical care throughout the rest of the country is inadequate and usually does not meet international standards. One or more [employer] accredited hospitals are present in [facility].  The national medical emergency number is 911.  A hyperbaric chamber  for diving injuries is located in [facility].  Upfront payment by cash or credit card, up to the total of all anticipated charges, is generally required by hospitals catering to foreigners prior to services or treatment. Upfront payment may be waived by hospitals that have existing cashless agreements with at least some major international insurance providers. All visitors must have personal medical insurance coverage that is effective in [facility].  Consular Advice  The material below includes information from the [place] Department of State (DOS), the [place] Foreign, [employer] (DIANNE), [employer] [place] (GAC), and [place]'s Department of Foreign Affairs and Trade (DFAT), as well as from additional open-source material. Standard safety precautions that apply to all international travel can be found in the Library article Safety and Security.  Consular Travel Warning  Due to crime and civil unrest, [place] (DFAT) advises avoiding travel within 20 km (12.4 mi) of the border with [facility] (except the official border crossing at [facility]) and also advises reconsidering travel (or avoiding nonessential travel) to the rest of the country. [place] (DOS), [place] (DIANNE), and [place] (GAC) have more limited warnings.  Terrorism Risk  No intrinsic risk of attack by terrorist groups exists, but unforeseen attacks are possible.  Crime  High risk of violent crime (armed robbery and sexual assault) and high risk of mancilla crime exist throughout the country, especially in [facility] (particularly in [facility], [facility], and [facility] and the districts of [facility], [place] [facility], [facility], [facility], [facility] in the [place], and [facility]); in [facility] (in the districts of [facility], [place], [facility], and [facility] [including [facility]]; near the bus terminal, the downtown and market areas, and the [facility] [statue of Juan Bandar] on [facility]); in other [facility] ([facility], [facility], and  [facility]); on volcano hiking trails (including [place] near [facility], [facility], the [facility] volcano, and the volcano outside the limits of the [facility] or its pathway); in [facility] ([facility]); on beaches in the province of [facility]; in jungle lodges in the [facility] and [facility].  Theft of valuables from unattended accommodations is common.  Kidnappings by criminal groups occur in northern and northeastern areas bordering [facility] and [place], including the [facility]. Targets may include foreigners (especially Westerners), including foreigners working for oil [employer].  Express kidnappings to force cash withdrawals at ATMs occur throughout the country, especially in [facility].  Scams involving ATMs, credit cards, and the use of distraction techniques to commit robbery (including squirting substances on victims) have been reported.  Risk exists of robberies and/or assaults occurring after consuming intentionally drugged food or drink; tourists are frequently targeted.  Civil Unrest  Protests and demonstrations occur throughout the country and have the potential to turn violent without warning. Bystanders are at risk of harm from violence or from the response by authorities. Disruption to transportation, free movement, or the ability to carry out daily activities may occur.  Unsafe Areas  A dangerous security environment exists and armed groups are present in areas bordering [facility], including [facility].  Water Safety  Passenger boats may be unsafe on the [facility]. Decline water transportation in vessels that appear overloaded or lack personal flotation devices or life jackets.  Rent water sports equipment from reputable operators. Scuba dive only with personnel certified by PADI or NAUI, and use equipment only from PADI- or NAUI-certified dive operators.  Outdoor Safety  Basic safety standards for adventure  activities (including bungee jumping, canopy tours, and recreational off-roading) may not be in place. Travelers should only use reputable adventure-sport operators for activities and equipment rentals.  Transportation Safety  High risk of traffic-related injury or death exists. The road traffic death rate is 12 to 24 per 100,000 population. The rate is less than 10 in most high-income countries.   Airline Safety  The  Federal Aviation Administration has determined that the civil aviation authority of this country oversees its air carriers in accordance with minimum international safety standards.  Natural Disasters  The rainy season is from May through November in areas east of the [facility] and December through May in coastal areas. Floods, mudslides, and landslides may occur.  Seismic and volcanic activity frequently occur.  Consular Information  Selected Embassies or Consulates in [facility]  [place]: [[phone]; ec.usembassy.gov  [place]: [+593] 3-7138-663; www.ec[place].Avillion.ca  [place]: [+593] 9-9666-200; www.gov.uk/world/organisations/Turkmen-embassy-in[facility]  [place]: [+593] 2-210-4546  [facility] Embassies or Consulates in Selected Countries  In the [place] www.[place].org  In [place]: www.embassyec[place].ca  In the [place].: sima.ronda.gob.ec  In [place]: [place].ronda.gob.ec  Visa/HIV Testing  HIV testing is not required to obtain a tourist, work, or residence visa.  Basic Protective Measures  Many travel-related health and safety problems can be significantly reduced through appropriate behavior by the traveler. Risk can be minimized by adherence to the following measures.  Health  Insect Precautions  Wear clothing that covers as much skin as practicable.  Apply a repellent to all exposed, nonsensitive areas of the body. Frequent application ensures continuous protection. When both an insect repellent and sunscreen are used, apply the sunscreen first, let it dry  completely, then apply the repellent. Very limited data suggest that DEET-containing repellents reduce a sunscreen's stated SPF UVB protection by as much as one-third, requiring more frequent sunscreen application. Sunscreens do not appear to reduce the efficacy of insect repellents (DEET or picaridin) but may increase the absorption of DEET (but not picaridin) through the skin, even when the sunscreen is applied first as recommended. Never use a combination sunscreen/insect repellent product (e.g., Sterling Skin Soft Bug Guard, Bull Frog Mosquito Coast Sunscreen with Insect Repellent, or Sunsect).  Use a repellent containing DEET (N,N-diethyl-meta-toluamide; 30%-35% concentration) or, alternatively, a repellent containing picaridin (20% concentration or greater for tropical destinations; also known as icaridin). Picaridin, unlike DEET, has a pleasant smell and does not dissolve plastic materials.  Determine the time of day and type of insects to be avoided when choosing when to apply repellent.  Applicable to malaria risk countries: Mosquitoes that transmit malaria (Anopheles spp.) are generally night biters with activity between dusk and parth.  Applicable to West Nile virus and Japanese encephalitis risk countries: Mosquitoes that transmit these diseases (Culex spp.) are generally night biters but have peak activity at dusk and again at parth.  Applicable to chikungunya, dengue, yellow fever, or Zika risk countries: Mosquitoes that transmit these diseases (Aedes spp.) can bite throughout the day but have peak activity during early morning and late afternoon and evening.  Applicable to leishmaniasis risk countries: Sandflies that transmit leishmaniasis are active from dusk to parth, but in forests and dark rooms they may bite during the daytime if disturbed.  Applicable to African trypanosomiasis risk countries: DEET is generally ineffective. Wear light-colored (not blue), heavyweight clothing in risk areas.  Treat outer  clothing, boots, tents, and sleeping bag liners with permethrin (or other pyrethroid) when traveling in an area of very high risk for mosquito-borne or tick-borne diseases.  Sleep under a permethrin-impregnated bed net when at high risk of malaria or Japanese encephalitis if not sleeping in a sealed, air-conditioned room. Regularly check the net for rips and tears and keep it tucked in around the bed at all times. Ensure that all open windows have insect screens.  Use spatial repellent products in the form of an aerosol spray, vaporizer device, or smoldering coil. These products usually contain a pyrethroid (e.g., metofluthrin or allethrin).  Perform a full body check for ticks at least once a day when staying in areas where tick-borne disease is a risk.  Safe Food and Beverages  Wash hands with soap before eating and after using the toilet. If water is not available, use disposable antiseptic wipes or an alcohol-based hand .  Avoid food from street vendors or market stalls.  Choose establishments that are known to cater to foreigners.  Avoid buffets if food covers or fly controls are not used or foods have not been kept steaming hot.  Avoid undercooked meat, seafood, and fish; unpasteurized dairy products, such as cheese, yogurt, and milk; creamy desserts; cold sauces such as mayonnaise, salad dressing, and salsas; and leafy or uncooked vegetables and salads.  Eat well-cooked, steaming-hot foods. Other foods that are safer to eat include breads, tortillas, crackers, biscuits, and other baked goods as well as canned foods and fruits, nuts, and vegetables with thick skins, peels or shells that can be removed.  Avoid tap water or anything mixed with tap water and do not rinse toothbrushes in tap water.  Do not use ice unless it is made from boiled, bottled, or purified water. Freezing does not kill the organisms that cause diarrhea.  Use sealed bottled water or chemically treated, filtered, or boiled water  for drinking and making ice and for brushing teeth.  Drink canned, boxed, or commercially bottled carbonated water and drinks. Beer and wine are safe to drink; however, alcohol added to other beverages does not render the beverages safe.  Purify water if one of these options is not available (see Treating Water). Decide which method to use for water purification and bring along the appropriate equipment or chemicals. Do not assume that water is safe because it is chlorinated. Chlorination does not destroy all the organisms that can cause illness.  Continue to breastfeed infants who are nursing because it is the safest food source for these infants. If formula is used for feeding infants, prepare with boiled water and sterilized containers.  Blood-Borne and Sexually Transmitted Infections (STIs)  Use condoms in all sexual encounters; unprotected casual sex, whether with local residents or with fellow travelers, always poses a high risk.  Understand that inhibitions are diminished when traveling away from the social constraints of home; excessive use of alcohol and recreational drugs can influence behavior and encourage unintentional risk exposure.  Avoid sexual relations with commercial sex workers.  Consider short-term HIV preexposure prophylaxis with Truvada if very high-risk sexual behaviors are anticipated.  Avoid skin-perforating procedures (acupuncture, piercing, or tattooing).  Avoid invasive medical or dental procedures in unaccredited medical facilities (unless in a life-threatening situation); request proof of accreditation by [employer] Commission International or other international bodies.  Consider carrying disposable needles, syringes, and sutures for remote travel.  Swimming and Water Exposure  Heed posted warnings and avoid beaches that are not patrolled.  Recognize rip currents as a calm area with flat [place] water in front of the beach where the waves are not breaking and a line of white foam moves  steadily seaward. Stay afloat, wave and yell for help, and swim parallel to the shore. Do not swim directly against the current in an attempt to get immediately back to shore; doing so may lead to exhaustion and drowning.  Do not swim alone or after dark and do not walk on any beach after dark.  Avoid use of alcohol or mind-altering drugs while engaging in water sports. Avoid water where sewage contamination or algae are present. Avoid any exposure (rafting, swimming, or wading) in water known to be infected with schistosomiasis (bilharzia).  Scuba dive only with personnel certified by the Professional Association of Diving Instructors (PADI) or the National Associated of Underwater Instructors (NAUI); use equipment only from PADI- or NAUI-certified dive operators.  Follow established timetables for air travel after diving. The time from the end of the dive until the boarding of an aircraft is generally between 12 and 24 hours, depending on the type of dive.  Decline water transportation in vessels without personal flotation devices or life jackets.  Wear appropriate footwear when walking, wading, or swimming to avoid injury and exposure to parasites and poisonous plants and animals.  Consider leptospirosis prophylaxis with 200 mg of doxycycline once per week (or 100 mg per day if in use for concomitant malaria prophylaxis) in developing countries where substantial risk of leptospirosis exists due to activities with exposure to water or wet environments (e.g., hikers, bikers, or adventurer travelers).  Sit on a towel, blanket, or piece of clothing if a chair or hammock is not available because sand may be contaminated in areas frequented by animals. Thoroughly shake out all fabrics after use.  Avoid eating amberjack, jef, mackerel, walter-walter, or tuna due to risk of scombroid poisoning.  Rabies  Never assume that an animal or bat is free of rabies.  Avoid entering caves due to the possibility of exposure to bats  and their droppings.  Do not handle or feed pets, unknown animals (especially dogs and monkeys), or bats. Children should be closely supervised.  Clean any bite, scratch, or lick on broken skin immediately with soapy water; seek postexposure prophylaxis for rabies (even if rabies vaccine was completed before exposure) or herpes B virus (transmitted by monkey bites).  Minimize running or bicycling in high-risk rabies areas to avoid provoking domestic animals.  Skin/Wound Care  Extra vigilance, as outlined below, is recommended.  Clean any bite, cut, or broken skin with safe water. Broken skin may become infected and lead to serious problems. Apply an antiseptic solution or spray.  Seek medical help if increasing pain, redness, or discharge from a wound occurs, which suggests a spreading infection and may require antibiotic treatment.  Always wear hats and apply sunscreen in the tropics. When both an insect repellent and sunscreen are used, apply the sunscreen first, let it dry completely, then apply the repellent. Very limited data suggest that DEET-containing repellents reduce a sunscreen's stated SPF UVB protection by as much as one-third, requiring more frequent sunscreen application. Sunscreens do not appear to reduce the efficacy of insect repellents (DEET or picaridin) but may increase the absorption of DEET (but not picaridin) through the skin, even when the sunscreen is applied first as recommended. Never use a combination sunscreen/insect repellent product (e.g., Tracey Skin Soft Bug Guard, Bull Frog Mosquito Coast Sunscreen with Insect Repellent, or Sunsect).  Applicable only to  countries: Iron all clothes that have been dried outdoors to prevent skin infestation by the larvae of the tumbu fly.  Tuberculosis  Practice hand hygiene diligently.  Avoid crowded public transportation or crowded public places that are poorly ventilated.  Move away from anyone with a persistent or intense cough.  Screen  domestic workers for tuberculosis.  Have a tuberculosis skin test or tuberculosis blood test before departure, once per year thereafter, and upon returning home (if planning a long stay to areas of the world where TB is highly or moderately endemic).  Pretravel Checklist  Have predeparture medical and dental exams.  Express any concerns about jet lag, altitude illness, or motion sickness to a travel health provider, who may suggest suitable medications.  Pack adequate supplies of necessary medications and ensure that they are securely packaged in their original, labeled prescription containers and carried in multiple places. Travelers should have a letter from a physician stating the medical condition and the medications and/or medical supplies being carried.  If traveling with a controlled drug for personal use, review medication regulations on the International Narcotics Control Board website (http://www.incb.org/incb/en/travellers/index.html) as well as official government sites. Rules on amphetamine-based medications used for attention-deficit/hyperactive disorders should always be checked before travel.  Prepare a compact medical kit that includes the following: simple first-aid supplies (such as bandages, gauze, hemostatic gauze, antiseptic, antibiotic ointment, butterfly bandages, skin glue, and splinter forceps), a thermometer, antipyretic agents, antifungal creams, cough and cold remedies, antacids, hydrocortisone cream, and blister pads.  Pack a spare pair of eyeglasses or contact lenses and adequate cleansing solution, if applicable.  Pack sunglasses, wide-brimmed hats, sunscreen (SPF 30+), and lip protection to avoid sun exposure problems during travel.  Arrange adequate medical and evacuation insurance when traveling, even for short trips. Ensure all preexisting medical issues are declared to the insurer so that noncovered conditions are ascertained in advance. Have the insurer's contact details  recorded and accessible at all times during travel.  Carry a list of contact information for hometown medical providers, health insurance carriers, and a medical assistance company, keeping it accessible at all times.  Carry a list of medical conditions, allergies, and medications (with dosages).  Carry a copy of a recent electrocardiogram on a portable USB drive or ensure that it can be accessed on the internet (for those with cardiac disease).  Safety  Safety and Crime Avoidance  Extra vigilance, as outlined below, is recommended.  Use caution in tourist sites and crowded areas and on or near public transportation; avoid marginal areas of cities.  Be wary of any stranger who initiates conversation or physical contact in any way, no matter how accidental it may seem.  Be familiar with common local scams and distraction techniques.  Avoid using ATMs at night.  Minimize visible signs of wealth in dress or jewelry.  Wear handbags across the chest to prevent theft.  Give up valuables if confronted. Money and passports can be replaced; life cannot.  Use taxis from official ranks or dispatched via smart phone stefano or radio from a repCloudstaff company.  Carry only a photocopy of the passport face page and legal entry stamp unless otherwise required by authorities; leave the actual passport in a hotel safe or other safe place.  Advise at least 1 other person of one's whereabouts and expected schedule.  [place] a foreign trip and residence information with the Department of State at travelregistration.state.gov ([place] citizens only), which facilitates communication and assistance in case of an emergency.  Safety in the Hotel  Keep hotel doors locked at all times.  Seek out and read fire safety instructions in the hotel room. Become familiar with escape routes upon arrival.  Keep valuables in the room safe or the hotel safe.  Safety while Driving  Do not drink and drive.  Avoid overcrowded transportation.  Keep automobile doors  locked and windows closed at all times, if possible.  Seek vehicles with seat belts, which may result in extra expense; decline vehicles without seat belts unless no choice is available.  Decline transportation in vehicles with worn tires, worn brakes, or inoperative lights.  Avoid driving at night or alone; seek local advice before driving outside urban areas after dark.  Never drive a motorcycle or scooter abroad; passengers should wear a helmet.  If planning a long stay, arrange for local mobile phone service (either a personal phone with a local plan or a locally purchased phone) to be in the vehicle when traveling.

## 2024-01-25 LAB — BACTERIA UR CULT: NORMAL

## 2024-02-14 ENCOUNTER — VIRTUAL VISIT (OUTPATIENT)
Dept: FAMILY MEDICINE | Facility: CLINIC | Age: 49
End: 2024-02-14
Payer: COMMERCIAL

## 2024-02-14 DIAGNOSIS — J40 BRONCHITIS: Primary | ICD-10-CM

## 2024-02-14 PROCEDURE — 99213 OFFICE O/P EST LOW 20 MIN: CPT | Mod: 95 | Performed by: INTERNAL MEDICINE

## 2024-02-14 RX ORDER — FLUTICASONE PROPIONATE AND SALMETEROL 250; 50 UG/1; UG/1
1 POWDER RESPIRATORY (INHALATION) EVERY 12 HOURS
Qty: 60 EACH | Refills: 3 | Status: SHIPPED | OUTPATIENT
Start: 2024-02-14

## 2024-02-14 RX ORDER — AZITHROMYCIN 250 MG/1
TABLET, FILM COATED ORAL
Qty: 6 TABLET | Refills: 0 | Status: SHIPPED | OUTPATIENT
Start: 2024-02-14 | End: 2024-02-19

## 2024-02-14 NOTE — PROGRESS NOTES
Mable is a 48 year old who is being evaluated via a billable video visit.      How would you like to obtain your AVS? MyChart  If the video visit is dropped, the invitation should be resent by: Text to cell phone: [phone]  Will anyone else be joining your video visit? No        Subjective   Mable is a 48 year old, presenting for the following health issues:  Cough    History of Present Illness       Reason for visit:  Cough  Symptom onset:  More than a month  Symptoms include:  Short of breath, coughing  Symptom intensity:  Moderate  Symptom progression:  Worsening  Had these symptoms before:  No  What makes it worse:  N/A  What makes it better:  N/A        Cough   Everette Mayen had a cough about 1 month ago that has been present x 1 month.  Non-productive.  She has a bit of shortness of breath when coughing is worse.  Notes exercise doesn't trigger it.  She has tried omeprazole over the past week with no improvement.        Review of Systems  Constitutional, HEENT, cardiovascular, pulmonary, GI, , musculoskeletal, neuro, skin, endocrine and psych systems are negative, except as otherwise noted.      Objective           Vitals:  No vitals were obtained today due to virtual visit.    Physical Exam   GENERAL: alert and no distress  EYES: Eyes grossly normal to inspection.  No discharge or erythema, or obvious scleral/conjunctival abnormalities.  RESP: No audible wheeze, cough, or visible cyanosis.    SKIN: Visible skin clear. No significant rash, abnormal pigmentation or lesions.  NEURO: Cranial nerves grossly intact.  Mentation and speech appropriate for age.  PSYCH: Appropriate affect, tone, and pace of words    (J40) Bronchitis  (primary encounter diagnosis)  Comment: We will treat empirically with azithromycin x 5 days and Advair twice per day.  Recommend follow up in 2-3 weeks if not improved  Plan: azithromycin (ZITHROMAX) 250 MG tablet,         fluticasone-salmeterol (ADVAIR) 250-50 MCG/ACT          inhaler                 Video-Visit Details    Type of service:  Video Visit     Originating Location (pt. Location): Home    Distant Location (provider location):  On-site  Platform used for Video Visit: Chandan  Signed Electronically by: Marco Nunez MD  Start Time  2:25 PM   End Time 2:37 PM

## 2024-02-29 PROBLEM — R87.810 CERVICAL HIGH RISK HPV (HUMAN PAPILLOMAVIRUS) TEST POSITIVE: Status: ACTIVE | Noted: 2023-03-15

## 2024-02-29 NOTE — TELEPHONE ENCOUNTER
"Late entry:  5/18/23 Patient self-referred to [facility] due to Humira use. [place] Bx: benign (with Dr. Hernandez). Plan per visit notes: \"RTC in 1 year for HPV-based testing (MD, in-person, HPV clinic)\"  "

## 2024-03-17 ENCOUNTER — HEALTH MAINTENANCE LETTER (OUTPATIENT)
Age: 49
End: 2024-03-17

## 2024-05-02 ENCOUNTER — LAB REQUISITION (OUTPATIENT)
Dept: LAB | Facility: CLINIC | Age: 49
End: 2024-05-02
Payer: COMMERCIAL

## 2024-05-02 DIAGNOSIS — Z79.899 OTHER LONG TERM (CURRENT) DRUG THERAPY: ICD-10-CM

## 2024-05-02 LAB
ALT SERPL W P-5'-P-CCNC: 15 U/L (ref 0–50)
AST SERPL W P-5'-P-CCNC: 23 U/L (ref 0–45)
BASOPHILS # BLD AUTO: 0 10E3/UL (ref 0–0.2)
BASOPHILS NFR BLD AUTO: 0 %
EOSINOPHIL # BLD AUTO: 0.1 10E3/UL (ref 0–0.7)
EOSINOPHIL NFR BLD AUTO: 1 %
ERYTHROCYTE [DISTWIDTH] IN BLOOD BY AUTOMATED COUNT: 12.4 % (ref 10–15)
HCT VFR BLD AUTO: 45.5 % (ref 35–47)
HGB BLD-MCNC: 14.4 G/DL (ref 11.7–15.7)
IMM GRANULOCYTES # BLD: 0 10E3/UL
IMM GRANULOCYTES NFR BLD: 0 %
LYMPHOCYTES # BLD AUTO: 2.4 10E3/UL (ref 0.8–5.3)
LYMPHOCYTES NFR BLD AUTO: 27 %
MCH RBC QN AUTO: 28.5 PG (ref 26.5–33)
MCHC RBC AUTO-ENTMCNC: 31.6 G/DL (ref 31.5–36.5)
MCV RBC AUTO: 90 FL (ref 78–100)
MONOCYTES # BLD AUTO: 0.8 10E3/UL (ref 0–1.3)
MONOCYTES NFR BLD AUTO: 9 %
NEUTROPHILS # BLD AUTO: 5.6 10E3/UL (ref 1.6–8.3)
NEUTROPHILS NFR BLD AUTO: 63 %
NRBC # BLD AUTO: 0 10E3/UL
NRBC BLD AUTO-RTO: 0 /100
PLATELET # BLD AUTO: 397 10E3/UL (ref 150–450)
RBC # BLD AUTO: 5.05 10E6/UL (ref 3.8–5.2)
WBC # BLD AUTO: 8.9 10E3/UL (ref 4–11)

## 2024-05-02 PROCEDURE — 86803 HEPATITIS C AB TEST: CPT | Mod: ORL | Performed by: DERMATOLOGY

## 2024-05-02 PROCEDURE — 84450 TRANSFERASE (AST) (SGOT): CPT | Mod: ORL | Performed by: DERMATOLOGY

## 2024-05-02 PROCEDURE — 84460 ALANINE AMINO (ALT) (SGPT): CPT | Mod: ORL | Performed by: DERMATOLOGY

## 2024-05-02 PROCEDURE — 86481 TB AG RESPONSE T-CELL SUSP: CPT | Mod: ORL | Performed by: DERMATOLOGY

## 2024-05-02 PROCEDURE — 87340 HEPATITIS B SURFACE AG IA: CPT | Mod: ORL | Performed by: DERMATOLOGY

## 2024-05-02 PROCEDURE — 85025 COMPLETE CBC W/AUTO DIFF WBC: CPT | Mod: ORL | Performed by: DERMATOLOGY

## 2024-05-02 PROCEDURE — 87389 HIV-1 AG W/HIV-1&-2 AB AG IA: CPT | Mod: ORL | Performed by: DERMATOLOGY

## 2024-05-04 LAB
GAMMA INTERFERON BACKGROUND BLD IA-ACNC: 6.7 IU/ML
M TB IFN-G BLD-IMP: NEGATIVE
M TB IFN-G CD4+ BCKGRND COR BLD-ACNC: 3.3 IU/ML
MITOGEN IGNF BCKGRD COR BLD-ACNC: -1.62 IU/ML
MITOGEN IGNF BCKGRD COR BLD-ACNC: 0.87 IU/ML
QUANTIFERON MITOGEN: 10 IU/ML
QUANTIFERON NIL TUBE: 6.7 IU/ML
QUANTIFERON TB1 TUBE: 7.57 IU/ML
QUANTIFERON TB2 TUBE: 5.08

## 2024-05-11 NOTE — PROGRESS NOTES
"Follow-up Note on Referred Patient  Gynecologic Oncology HPV Clinic  [facility]      Date of visit: 2024        Self-referred      Primary Care Provider:  Marco Nunez  [address] [facility] [address]  [facility] [address]       RE: Everette Mayen  : 1975  Language: English  Pronouns: she/her/hers       Reason for visit: non-16/18 hrHPV+ cervical cancer screening in the setting of immunosuppression.       History of Present Illness:   Everette Alejandro \"Mable\" Tiarra is a 48 year old self-referred to the Gynecologic Oncology HPV Clinic on 2023 for evaluation of a non-16/18 hrHPV+ cervical cancer screening result. Medical history significant for psoriasis on immunosuppressive medication.  History as follows:    *HPV vaccination status: Vaccinated (3 doses in her 20s)    08, 09, 12: Pap test NILM.    16:  Pap test NILM, hrHPV-.     10/27/20: Pap test NILM, hrHPV-.    3/15/23: Pap test NILM, non-16/18 hrHPV+.  23: Cervical biopsy pathology negative for dysplasia/malignancy.       Subjective:  Mable presents to the gyn onc HPV clinic for a scheduled follow-up visit, unaccompanied.   Mable reports that she has officially entered menopause, as she has gone 1 year without a period. She does note occasional postcoital spotting, and is requesting gonorrhea/chlamydia testing. Otherwise no abnormal bleeding. No pain. No changes in medical/surgical/family history.       Past Medical History:  Past Medical History:   Diagnosis Date    Allergic rhinitis, cause unspecified     Depressive disorder, not elsewhere classified     Hypertension 2008    Other psoriasis        Past Surgical History:  Past Surgical History:   Procedure Laterality Date    ABDOMINOPLASTY           Gynecologic History:  Perimenopausal.  No history of hormone therapy.  No history of STIs.  Currently sexually active, no dyspareunia, no postcoital bleeding.       Obstetric History:  OB History    Para " Term  AB Living   1 0 0 0 1 0   SAB IAB Ectopic Multiple Live Births   0 0 0 0 0      # Outcome Date GA Lbr Souleymane/2nd Weight Sex Type Anes PTL Lv   1 AB                 Current Medications:   Current Outpatient Medications   Medication Sig Dispense Refill    adalimumab (HUMIRA) 40 MG/0.8ML prefilled syringe kit Inject 0.8 mLs (40 mg) Subcutaneous every 14 days 2 each 5    amLODIPine-benazepril (LOTREL) 5-10 MG capsule Take 1 capsule by mouth daily 90 capsule 3    clobetasol (TEMOVATE) 0.05 % external ointment Apply topically 2 times daily as needed (Psoriasis) 60 g 11    fluticasone-salmeterol (ADVAIR) 250-50 MCG/ACT inhaler Inhale 1 puff into the lungs every 12 hours 60 each 3    lurasidone (LATUDA) 20 MG TABS tablet Take 1 tablet (20 mg) by mouth daily with food 90 tablet 3    traZODone (DESYREL) 50 MG tablet Take 1 tablet (50 mg) by mouth At Bedtime 90 tablet 3     No current facility-administered medications for this visit.        Allergies:   Allergies   Allergen Reactions    Sulfa Antibiotics Hives     Lupus Erythematous    Sulfacetamide Hives and Itching     Lupus Erythematous    Lamotrigine Rash    Lamotrigine Rash         Social History:  Patient lives independently. 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 She does not have Advanced Directives, but has identified her ex-spouse Hussein Downs as her desired Healthcare Power of .    Social History     Tobacco Use    Smoking status: Never    Smokeless tobacco: Never   Substance Use Topics    Alcohol use: Yes     Alcohol/week: 0.0 standard drinks of alcohol     Comment: < 1 drink / week       History   Drug Use No       Family History:   No known family history of breast, ovarian, uterine, colon, urothelial/renal, prostate or pancreatic cancers.  No known family history of melanoma.   Family History   Problem Relation Age of Onset    Hypertension Mother     Allergies Mother     Arthritis Mother         psoriasis    Depression Mother      Genitourinary Problems Mother         urinary incontinence    Gynecology Mother         hyst age 48    Substance Abuse Father     Alcohol/Drug Father     Depression Father     Diabetes Maternal Grandmother     Eye Disorder Maternal Grandmother     Cerebrovascular Disease Maternal Grandfather     Cardiovascular Paternal Grandfather     Genetic Disorder Son         No further genetic testing planned, microcephaly    Seizure Disorder Son          age 11    Other Cancer Other     Melanoma No family hx of     Skin Cancer No family hx of     Coronary Artery Disease No family hx of     Hyperlipidemia No family hx of     Breast Cancer No family hx of     Colon Cancer No family hx of     Prostate Cancer No family hx of     Anxiety Disorder No family hx of     Anesthesia Reaction No family hx of     Asthma No family hx of     Osteoporosis No family hx of     Thyroid Disease No family hx of        Physical Exam:   /83 (BP Location: Left arm, Patient Position: Sitting, Cuff Size: Adult Large)   Pulse 79   Temp 98.3  F (36.8  C) (Oral)   Resp 18   Wt 67.8 kg (149 lb 6.4 oz)   SpO2 96%   BMI 27.33 kg/m    Body mass index is 27.33 kg/m .    General Appearance: healthy and alert, no distress     Musculoskeletal: extremities non tender and without edema    Skin: no lesions or rashes     Neurological: normal gait, no gross defects     Genitourinary: External genitalia and urethral meatus appears normal.  Vagina is normal in appearance.  Cervix appears normal and with a prominent ectropion.    Verbal consent obtained from the patient for the pelvic exam.   Each step of the exam was verbalized throughout.  Present for the exam: Donnie Shelton MD (PGY3)--exam and procedure performed by Dr. Shelton under my direct supervision.        Procedure Risk Statement:   The patient was counseled regarding risks, benefits and alternatives to colposcopy, possible biopsies. We also discussed guideline-adherent option of Pap and HPV test  today, with colposcopy only as indicated--Mable prefers concomitant colposcopy given immunosuppression, increased risk of persistent HPV and increased risk of HPV-associated disease.  Risks discussed include but not limited to:  Bleeding; infection; injury to adjacent organs; inadequate sample or false negative result.  The patient's questions were answered, and informed consent signed.       Procedure:   Colposcopy:  After the informed consent was signed and time-out procedure was performed, dilute acetic acid was applied to the cervix. Colposcopy performed.  -Squamocolumnar junction fully visualized? Yes  -Acetowhite changes? Mild acetowhite changes at the anterior cervix  -Punctations, mosaicism, abnormal vasculature, other abnormalities? None  -Clinical Impression: Normal *  -Specimens: Ectocervix 12:00   Ectocervical biopsy taken at 12:00 using Tischler forceps. Hemostasis achieved with application of silver nitrate.       Performance Status:  ECOG Grade 0.       Assessment:  Mable Mayen is a 48 year old  woman with a diagnosis of non-16/18 hrHPV+ cervical cancer screening, colposcopic impression normal, Pap/HPV co-test and biopsy pathology pending.     Postcoital bleeding, likely due to ectropion, gonorrhea and chlamydia test request pending.     Medical history significant for psoriasis on immunosuppressive therapy.       Plan:   1.)    Non-16/18 hrHPV+:   -If NILM, hrHPV-: RTC in 3 years for follow-up.  -If hrHPV+: RTC in 1 year for screening with consideration of concomitant colposcopy given immunosuppression.      2.) Genetic risk factors were assessed and the patient does not meet the qualifications for a referral.      3.) Labs and/or tests ordered include: 1) Pap & HPV co-test; 2) Surgical pathology: Cervix 12:00  Will notify patient of results via [employer].      4.) Health maintenance issues addressed today include:   -Gonorrhea, chlamydia urine testing. Will treat as indicated.     5.) Code status:   Full-code.    6.) Prescriptions: None.      A total of 20 minutes was spent with the patient, 12 minutes of which were spent in counseling the patient and/or treatment planning, 8 minutes of which were spent in the procedure above; an additional 5 minutes was spent in chart review/documentation.        Jess Hernandez MD, MS, FACOG, FACS  5/16/2024  2:31 PM          CC  Patient Care Team:  Marco Nunez MD as PCP - General (Internal Medicine)  Moo Montes MD as MD (Dermatology)  Fabby Levine APRN CNM as Assigned OBGYN Provider  Jess Hernandez MD as MD (Gynecologic Oncology)  Jt Winston MD as MD (Psychiatry)  Jess Hernandez MD as Assigned Cancer Care Provider  Marco Nunez MD as Assigned PCP

## 2024-05-11 NOTE — PATIENT INSTRUCTIONS
SCHEDULING:   -RTC in 1 year for HPV-based testing (MD, in-person, HPV clinic) --order(s) placed       DIAGNOSIS:  Non-16/18 hrHPV+ cervical cancer screening.     Postcoital bleeding. Likely due to prominent cervical ectropion, but order placed for gonorrhea/chlamydia urine testing.       PLAN:  1) non-16/18 hrHPV+: We will notify you of the pathology results via [employer].  -If Pap test NILM, hrHPV-: RTC in 3 years for screening.  -If hrHPV+: RTC in 1 year for repeat screening.   -If biopsy HSIL: Plan for excisional procedure.  -If biopsy LSIL or normal: Repeat HPV-based testing (HPV +/- Pap test) in 1 year.     2) Gonorrhea, chlamydia testing: We will notify you of results via [employer].   Will treat as indicated.     Please call with any questions or concerns. [phone]       Jess Hernandez MD, MS, FACOG, FACS  5/16/2024  2:35 PM

## 2024-05-16 ENCOUNTER — OFFICE VISIT (OUTPATIENT)
Dept: ONCOLOGY | Facility: CLINIC | Age: 49
End: 2024-05-16
Attending: OBSTETRICS & GYNECOLOGY
Payer: COMMERCIAL

## 2024-05-16 VITALS
HEART RATE: 79 BPM | DIASTOLIC BLOOD PRESSURE: 83 MMHG | TEMPERATURE: 98.3 F | OXYGEN SATURATION: 96 % | WEIGHT: 149.4 LBS | BODY MASS INDEX: 27.33 KG/M2 | RESPIRATION RATE: 18 BRPM | SYSTOLIC BLOOD PRESSURE: 122 MMHG

## 2024-05-16 DIAGNOSIS — N93.0 POSTCOITAL BLEEDING: ICD-10-CM

## 2024-05-16 DIAGNOSIS — R87.810 CERVICAL HIGH RISK HPV (HUMAN PAPILLOMAVIRUS) TEST POSITIVE: Primary | ICD-10-CM

## 2024-05-16 PROCEDURE — 57455 BIOPSY OF CERVIX W/SCOPE: CPT | Performed by: OBSTETRICS & GYNECOLOGY

## 2024-05-16 PROCEDURE — 99213 OFFICE O/P EST LOW 20 MIN: CPT | Mod: 25 | Performed by: OBSTETRICS & GYNECOLOGY

## 2024-05-16 PROCEDURE — 88305 TISSUE EXAM BY PATHOLOGIST: CPT | Mod: 26 | Performed by: PATHOLOGY

## 2024-05-16 PROCEDURE — 88305 TISSUE EXAM BY PATHOLOGIST: CPT | Mod: TC | Performed by: OBSTETRICS & GYNECOLOGY

## 2024-05-16 PROCEDURE — 87624 HPV HI-RISK TYP POOLED RSLT: CPT | Performed by: OBSTETRICS & GYNECOLOGY

## 2024-05-16 PROCEDURE — G0124 SCREEN C/V THIN LAYER BY MD: HCPCS | Performed by: PATHOLOGY

## 2024-05-16 PROCEDURE — G0145 SCR C/V CYTO,THINLAYER,RESCR: HCPCS | Performed by: OBSTETRICS & GYNECOLOGY

## 2024-05-16 ASSESSMENT — PAIN SCALES - GENERAL: PAINLEVEL: NO PAIN (0)

## 2024-05-16 NOTE — PROGRESS NOTES
"Oncology Rooming Note    May 16, 2024 1:45 PM   Everette Mayen is a 48 year old female who presents for:    Chief Complaint   Patient presents with    Oncology Clinic Visit     Cervical high risk HPV (human papillomavirus) test positive       Initial Vitals: /83 (BP Location: Left arm, Patient Position: Sitting, Cuff Size: Adult Large)   Pulse 79   Temp 98.3  F (36.8  C) (Oral)   Resp 18   Wt 67.8 kg (149 lb 6.4 oz)   SpO2 96%   BMI 27.33 kg/m   Estimated body mass index is 27.33 kg/m  as calculated from the following:    Height as of 1/22/24: 1.575 m (5' 2\").    Weight as of this encounter: 67.8 kg (149 lb 6.4 oz). Body surface area is 1.72 meters squared.  No Pain (0) Comment: Data Unavailable   No LMP recorded. Patient is perimenopausal.  Allergies reviewed: Yes  Medications reviewed: Yes    Medications: Medication refills not needed today.  Pharmacy name entered into [email or web address]: [id number] [place], [place] - [address]    Frailty Screening:   Is the patient here for a new oncology consult visit in cancer care? 2. No      Clinical concerns: no      Dominique Coley CMA              "

## 2024-05-16 NOTE — LETTER
"    2024         RE: Everette Mayen  [address] [facility] [address] [facility] [id number]        Dear Colleague,    Thank you for referring your patient, Everette Mayen, to the [facility]. Please see a copy of my visit note below.    Follow-up Note on Referred Patient  Gynecologic Oncology HPV Clinic  [facility]      Date of visit: 2024        Self-referred      Primary Care Provider:  Marco Nunez  [address]  [facility] [address]       RE: Everette Mayen  : 1975  Language: English  Pronouns: she/her/hers       Reason for visit: non-16/18 hrHPV+ cervical cancer screening in the setting of immunosuppression.       History of Present Illness:   Everette Alejandro \"Mable\" Tiarra is a 48 year old self-referred to the Gynecologic Oncology HPV Clinic on 2023 for evaluation of a non-16/18 hrHPV+ cervical cancer screening result. Medical history significant for psoriasis on immunosuppressive medication.  History as follows:    *HPV vaccination status: Vaccinated (3 doses in her 20s)    08, 09, 12: Pap test NILM.    16:  Pap test NILM, hrHPV-.     10/27/20: Pap test NILM, hrHPV-.    3/15/23: Pap test NILM, non-16/18 hrHPV+.  23: Cervical biopsy pathology negative for dysplasia/malignancy.       Subjective:  Mable presents to the gyn onc HPV clinic for a scheduled follow-up visit, unaccompanied.   Mable reports that she has officially entered menopause, as she has gone 1 year without a period. She does note occasional postcoital spotting, and is requesting gonorrhea/chlamydia testing. Otherwise no abnormal bleeding. No pain. No changes in medical/surgical/family history.       Past Medical History:  Past Medical History:   Diagnosis Date     Allergic rhinitis, cause unspecified      Depressive disorder, not elsewhere classified      Hypertension 2008     Other psoriasis        Past Surgical History:  Past Surgical History: "   Procedure Laterality Date     ABDOMINOPLASTY           Gynecologic History:  Perimenopausal.  No history of hormone therapy.  No history of STIs.  Currently sexually active, no dyspareunia, no postcoital bleeding.       Obstetric History:  OB History    Para Term  AB Living   1 0 0 0 1 0   SAB IAB Ectopic Multiple Live Births   0 0 0 0 0      # Outcome Date GA Lbr Souleymane/2nd Weight Sex Type Anes PTL Lv   1 AB                 Current Medications:   Current Outpatient Medications   Medication Sig Dispense Refill     adalimumab (HUMIRA) 40 MG/0.8ML prefilled syringe kit Inject 0.8 mLs (40 mg) Subcutaneous every 14 days 2 each 5     amLODIPine-benazepril (LOTREL) 5-10 MG capsule Take 1 capsule by mouth daily 90 capsule 3     clobetasol (TEMOVATE) 0.05 % external ointment Apply topically 2 times daily as needed (Psoriasis) 60 g 11     fluticasone-salmeterol (ADVAIR) 250-50 MCG/ACT inhaler Inhale 1 puff into the lungs every 12 hours 60 each 3     lurasidone (LATUDA) 20 MG TABS tablet Take 1 tablet (20 mg) by mouth daily with food 90 tablet 3     traZODone (DESYREL) 50 MG tablet Take 1 tablet (50 mg) by mouth At Bedtime 90 tablet 3     No current facility-administered medications for this visit.        Allergies:   Allergies   Allergen Reactions     Sulfa Antibiotics Hives     Lupus Erythematous     Sulfacetamide Hives and Itching     Lupus Erythematous     Lamotrigine Rash     Lamotrigine Rash         Social History:  Patient lives independently. 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 She does not have Advanced Directives, but has identified her ex-spouse Hussein Downs as her desired Healthcare Power of .    Social History     Tobacco Use     Smoking status: Never     Smokeless tobacco: Never   Substance Use Topics     Alcohol use: Yes     Alcohol/week: 0.0 standard drinks of alcohol     Comment: < 1 drink / week       History   Drug Use No       Family History:   No known family history of  breast, ovarian, uterine, colon, urothelial/renal, prostate or pancreatic cancers.  No known family history of melanoma.   Family History   Problem Relation Age of Onset     Hypertension Mother      Allergies Mother      Arthritis Mother         psoriasis     Depression Mother      Genitourinary Problems Mother         urinary incontinence     Gynecology Mother         hyst age 48     Substance Abuse Father      Alcohol/Drug Father      Depression Father      Diabetes Maternal Grandmother      Eye Disorder Maternal Grandmother      Cerebrovascular Disease Maternal Grandfather      Cardiovascular Paternal Grandfather      Genetic Disorder Son         No further genetic testing planned, microcephaly     Seizure Disorder Son          age 11     Other Cancer Other      Melanoma No family hx of      Skin Cancer No family hx of      Coronary Artery Disease No family hx of      Hyperlipidemia No family hx of      Breast Cancer No family hx of      Colon Cancer No family hx of      Prostate Cancer No family hx of      Anxiety Disorder No family hx of      Anesthesia Reaction No family hx of      Asthma No family hx of      Osteoporosis No family hx of      Thyroid Disease No family hx of        Physical Exam:   /83 (BP Location: Left arm, Patient Position: Sitting, Cuff Size: Adult Large)   Pulse 79   Temp 98.3  F (36.8  C) (Oral)   Resp 18   Wt 67.8 kg (149 lb 6.4 oz)   SpO2 96%   BMI 27.33 kg/m    Body mass index is 27.33 kg/m .    General Appearance: healthy and alert, no distress     Musculoskeletal: extremities non tender and without edema    Skin: no lesions or rashes     Neurological: normal gait, no gross defects     Genitourinary: External genitalia and urethral meatus appears normal.  Vagina is normal in appearance.  Cervix appears normal and with a prominent ectropion.    Verbal consent obtained from the patient for the pelvic exam.   Each step of the exam was verbalized throughout.  Present for  the exam: Donnie Shelton MD (PGY3)--exam and procedure performed by Dr. Shelton under my direct supervision.        Procedure Risk Statement:   The patient was counseled regarding risks, benefits and alternatives to colposcopy, possible biopsies. We also discussed guideline-adherent option of Pap and HPV test today, with colposcopy only as indicated--Mable prefers concomitant colposcopy given immunosuppression, increased risk of persistent HPV and increased risk of HPV-associated disease.  Risks discussed include but not limited to:  Bleeding; infection; injury to adjacent organs; inadequate sample or false negative result.  The patient's questions were answered, and informed consent signed.       Procedure:   Colposcopy:  After the informed consent was signed and time-out procedure was performed, dilute acetic acid was applied to the cervix. Colposcopy performed.  -Squamocolumnar junction fully visualized? Yes  -Acetowhite changes? Mild acetowhite changes at the anterior cervix  -Punctations, mosaicism, abnormal vasculature, other abnormalities? None  -Clinical Impression: Normal *  -Specimens: Ectocervix 12:00   Ectocervical biopsy taken at 12:00 using Tischler forceps. Hemostasis achieved with application of silver nitrate.       Performance Status:  ECOG Grade 0.       Assessment:  Mable Mayen is a 48 year old  woman with a diagnosis of non-16/18 hrHPV+ cervical cancer screening, colposcopic impression normal, Pap/HPV co-test and biopsy pathology pending.     Postcoital bleeding, likely due to ectropion, gonorrhea and chlamydia test request pending.     Medical history significant for psoriasis on immunosuppressive therapy.       Plan:   1.)    Non-16/18 hrHPV+:   -If NILM, hrHPV-: RTC in 3 years for follow-up.  -If hrHPV+: RTC in 1 year for screening with consideration of concomitant colposcopy given immunosuppression.      2.) Genetic risk factors were assessed and the patient does not meet the qualifications for a  "referral.      3.) Labs and/or tests ordered include: 1) Pap & HPV co-test; 2) Surgical pathology: Cervix 12:00  Will notify patient of results via [employer].      4.) Health maintenance issues addressed today include:   -Gonorrhea, chlamydia urine testing. Will treat as indicated.     5.) Code status:  Full-code.    6.) Prescriptions: None.      A total of 20 minutes was spent with the patient, 12 minutes of which were spent in counseling the patient and/or treatment planning, 8 minutes of which were spent in the procedure above; an additional 5 minutes was spent in chart review/documentation.        Jess Hernandez MD, MS, FACOG, FACS  5/16/2024  2:31 PM          CC  Patient Care Team:  Marco Nunez MD as PCP - General (Internal Medicine)  Moo Montes MD as MD (Dermatology)  Fabby Levine APRN CNM as Assigned OBGYN Provider  Jses Hernandez MD as MD (Gynecologic Oncology)  Jt Winston MD as MD (Psychiatry)  Jess Hernandez MD as Assigned Cancer Care Provider  Marco Nunez MD as Assigned PCP        Oncology Rooming Note    May 16, 2024 1:45 PM   Everette Mayen is a 48 year old female who presents for:    Chief Complaint   Patient presents with     Oncology Clinic Visit     Cervical high risk HPV (human papillomavirus) test positive       Initial Vitals: /83 (BP Location: Left arm, Patient Position: Sitting, Cuff Size: Adult Large)   Pulse 79   Temp 98.3  F (36.8  C) (Oral)   Resp 18   Wt 67.8 kg (149 lb 6.4 oz)   SpO2 96%   BMI 27.33 kg/m   Estimated body mass index is 27.33 kg/m  as calculated from the following:    Height as of 1/22/24: 1.575 m (5' 2\").    Weight as of this encounter: 67.8 kg (149 lb 6.4 oz). Body surface area is 1.72 meters squared.  No Pain (0) Comment: Data Unavailable   No LMP recorded. Patient is perimenopausal.  Allergies reviewed: Yes  Medications reviewed: Yes    Medications: Medication refills not needed " today.  Pharmacy name entered into [employer]: [id number] - [place], [place] - [address]    Frailty Screening:   Is the patient here for a new oncology consult visit in cancer care? 2. No      Clinical concerns: no      Dominique Coley CMA                Again, thank you for allowing me to participate in the care of your patient.        Sincerely,        Jess Hernandez MD

## 2024-05-18 LAB
HPV HR 12 DNA CVX QL NAA+PROBE: POSITIVE
HPV16 DNA CVX QL NAA+PROBE: NEGATIVE
HPV18 DNA CVX QL NAA+PROBE: POSITIVE
HUMAN PAPILLOMA VIRUS FINAL DIAGNOSIS: ABNORMAL

## 2024-05-20 ENCOUNTER — LAB (OUTPATIENT)
Dept: LAB | Facility: CLINIC | Age: 49
End: 2024-05-20
Payer: COMMERCIAL

## 2024-05-20 DIAGNOSIS — N93.0 POSTCOITAL BLEEDING: ICD-10-CM

## 2024-05-20 LAB
PATH REPORT.COMMENTS IMP SPEC: NORMAL
PATH REPORT.COMMENTS IMP SPEC: NORMAL
PATH REPORT.FINAL DX SPEC: NORMAL
PATH REPORT.GROSS SPEC: NORMAL
PATH REPORT.MICROSCOPIC SPEC OTHER STN: NORMAL
PATH REPORT.RELEVANT HX SPEC: NORMAL
PHOTO IMAGE: NORMAL

## 2024-05-20 PROCEDURE — 87591 N.GONORRHOEAE DNA AMP PROB: CPT

## 2024-05-20 PROCEDURE — 87491 CHLMYD TRACH DNA AMP PROBE: CPT | Performed by: OBSTETRICS & GYNECOLOGY

## 2024-05-21 LAB
C TRACH DNA SPEC QL NAA+PROBE: NEGATIVE
N GONORRHOEA DNA SPEC QL NAA+PROBE: NEGATIVE

## 2024-05-22 LAB
BKR LAB AP GYN ADEQUACY: ABNORMAL
BKR LAB AP GYN INTERPRETATION: ABNORMAL
BKR LAB AP PREVIOUS ABNL DX: ABNORMAL
BKR LAB AP PREVIOUS ABNORMAL: ABNORMAL
PATH REPORT.COMMENTS IMP SPEC: ABNORMAL
PATH REPORT.COMMENTS IMP SPEC: ABNORMAL
PATH REPORT.RELEVANT HX SPEC: ABNORMAL

## 2024-05-24 DIAGNOSIS — I10 BENIGN ESSENTIAL HYPERTENSION: ICD-10-CM

## 2024-05-24 RX ORDER — AMLODIPINE AND BENAZEPRIL HYDROCHLORIDE 5; 10 MG/1; MG/1
1 CAPSULE ORAL DAILY
Qty: 90 CAPSULE | Refills: 3 | Status: SHIPPED | OUTPATIENT
Start: 2024-05-24 | End: 2024-07-03

## 2024-06-04 ENCOUNTER — E-VISIT (OUTPATIENT)
Dept: FAMILY MEDICINE | Facility: CLINIC | Age: 49
End: 2024-06-04
Payer: COMMERCIAL

## 2024-06-04 DIAGNOSIS — J06.9 UPPER RESPIRATORY TRACT INFECTION, UNSPECIFIED TYPE: Primary | ICD-10-CM

## 2024-06-04 PROCEDURE — 99421 OL DIG E/M SVC 5-10 MIN: CPT | Performed by: INTERNAL MEDICINE

## 2024-06-04 RX ORDER — AZITHROMYCIN 250 MG/1
TABLET, FILM COATED ORAL
Qty: 6 TABLET | Refills: 0 | Status: SHIPPED | OUTPATIENT
Start: 2024-06-04 | End: 2024-06-09

## 2024-06-04 NOTE — TELEPHONE ENCOUNTER
I called and spoke to Mable.  We discussed testing for viral illness versus treating empirically with antibiotics.  She elected to repeat a trial of azithromycin and follow up accordingly.    Provider E-Visit time total (minutes): 5

## 2024-06-30 SDOH — HEALTH STABILITY: PHYSICAL HEALTH: ON AVERAGE, HOW MANY MINUTES DO YOU ENGAGE IN EXERCISE AT THIS LEVEL?: 70 MIN

## 2024-06-30 SDOH — HEALTH STABILITY: PHYSICAL HEALTH: ON AVERAGE, HOW MANY DAYS PER WEEK DO YOU ENGAGE IN MODERATE TO STRENUOUS EXERCISE (LIKE A BRISK WALK)?: 7 DAYS

## 2024-06-30 ASSESSMENT — SOCIAL DETERMINANTS OF HEALTH (SDOH): HOW OFTEN DO YOU GET TOGETHER WITH FRIENDS OR RELATIVES?: MORE THAN THREE TIMES A WEEK

## 2024-07-01 NOTE — TELEPHONE ENCOUNTER
5/16/24 Appt with Dysplasia Clinic. [place] Bx & ECC: benign. ASCUS pap, + HR HPV 18 & other HR type (not 16). Plan RTC in 1 year for HPV-based testing (MD, in-person, HPV clinic)

## 2024-07-03 ENCOUNTER — OFFICE VISIT (OUTPATIENT)
Dept: FAMILY MEDICINE | Facility: CLINIC | Age: 49
End: 2024-07-03
Payer: COMMERCIAL

## 2024-07-03 VITALS
OXYGEN SATURATION: 97 % | TEMPERATURE: 98.2 F | DIASTOLIC BLOOD PRESSURE: 79 MMHG | BODY MASS INDEX: 27.34 KG/M2 | HEIGHT: 62 IN | RESPIRATION RATE: 18 BRPM | WEIGHT: 148.6 LBS | SYSTOLIC BLOOD PRESSURE: 120 MMHG | HEART RATE: 66 BPM

## 2024-07-03 DIAGNOSIS — Z12.31 ENCOUNTER FOR SCREENING MAMMOGRAM FOR BREAST CANCER: ICD-10-CM

## 2024-07-03 DIAGNOSIS — Z00.00 ROUTINE GENERAL MEDICAL EXAMINATION AT A HEALTH CARE FACILITY: Primary | ICD-10-CM

## 2024-07-03 DIAGNOSIS — Z12.11 SCREEN FOR COLON CANCER: ICD-10-CM

## 2024-07-03 DIAGNOSIS — J40 BRONCHITIS: ICD-10-CM

## 2024-07-03 DIAGNOSIS — F31.76 BIPOLAR DISORDER, IN FULL REMISSION, MOST RECENT EPISODE DEPRESSED (H): ICD-10-CM

## 2024-07-03 DIAGNOSIS — I10 BENIGN ESSENTIAL HYPERTENSION: ICD-10-CM

## 2024-07-03 LAB
ERYTHROCYTE [DISTWIDTH] IN BLOOD BY AUTOMATED COUNT: 12.2 % (ref 10–15)
HBV SURFACE AB SERPL IA-ACNC: <3.5 M[IU]/ML
HBV SURFACE AB SERPL IA-ACNC: NONREACTIVE M[IU]/ML
HCT VFR BLD AUTO: 42.6 % (ref 35–47)
HGB BLD-MCNC: 13.8 G/DL (ref 11.7–15.7)
MCH RBC QN AUTO: 28.7 PG (ref 26.5–33)
MCHC RBC AUTO-ENTMCNC: 32.4 G/DL (ref 31.5–36.5)
MCV RBC AUTO: 89 FL (ref 78–100)
PLATELET # BLD AUTO: 364 10E3/UL (ref 150–450)
RBC # BLD AUTO: 4.81 10E6/UL (ref 3.8–5.2)
WBC # BLD AUTO: 8.5 10E3/UL (ref 4–11)

## 2024-07-03 PROCEDURE — 90632 HEPA VACCINE ADULT IM: CPT | Performed by: INTERNAL MEDICINE

## 2024-07-03 PROCEDURE — 86706 HEP B SURFACE ANTIBODY: CPT | Performed by: INTERNAL MEDICINE

## 2024-07-03 PROCEDURE — 90471 IMMUNIZATION ADMIN: CPT | Performed by: INTERNAL MEDICINE

## 2024-07-03 PROCEDURE — 36415 COLL VENOUS BLD VENIPUNCTURE: CPT | Performed by: INTERNAL MEDICINE

## 2024-07-03 PROCEDURE — 90472 IMMUNIZATION ADMIN EACH ADD: CPT | Performed by: INTERNAL MEDICINE

## 2024-07-03 PROCEDURE — 85027 COMPLETE CBC AUTOMATED: CPT | Performed by: INTERNAL MEDICINE

## 2024-07-03 PROCEDURE — 90480 ADMN SARSCOV2 VAC 1/ONLY CMP: CPT | Performed by: INTERNAL MEDICINE

## 2024-07-03 PROCEDURE — 99396 PREV VISIT EST AGE 40-64: CPT | Mod: 25 | Performed by: INTERNAL MEDICINE

## 2024-07-03 PROCEDURE — 80061 LIPID PANEL: CPT | Performed by: INTERNAL MEDICINE

## 2024-07-03 PROCEDURE — 80053 COMPREHEN METABOLIC PANEL: CPT | Performed by: INTERNAL MEDICINE

## 2024-07-03 PROCEDURE — 90677 PCV20 VACCINE IM: CPT | Performed by: INTERNAL MEDICINE

## 2024-07-03 PROCEDURE — 91320 SARSCV2 VAC 30MCG TRS-SUC IM: CPT | Performed by: INTERNAL MEDICINE

## 2024-07-03 RX ORDER — LURASIDONE HYDROCHLORIDE 20 MG/1
20 TABLET, FILM COATED ORAL
Qty: 90 TABLET | Refills: 3 | Status: SHIPPED | OUTPATIENT
Start: 2024-07-03

## 2024-07-03 RX ORDER — TRAZODONE HYDROCHLORIDE 50 MG/1
50 TABLET, FILM COATED ORAL AT BEDTIME
Qty: 90 TABLET | Refills: 3 | Status: SHIPPED | OUTPATIENT
Start: 2024-07-03

## 2024-07-03 RX ORDER — AMLODIPINE AND BENAZEPRIL HYDROCHLORIDE 5; 10 MG/1; MG/1
1 CAPSULE ORAL DAILY
Qty: 90 CAPSULE | Refills: 3 | Status: SHIPPED | OUTPATIENT
Start: 2024-07-03

## 2024-07-03 ASSESSMENT — PAIN SCALES - GENERAL: PAINLEVEL: NO PAIN (0)

## 2024-07-03 NOTE — PROGRESS NOTES
Prior to immunization administration, verified patients identity using patient s name and date of birth. Please see Immunization Activity for additional information.     Screening Questionnaire for Adult Immunization    Are you sick today?   No   Do you have allergies to medications, food, a vaccine component or latex?   Yes   Have you ever had a serious reaction after receiving a vaccination?   No   Do you have a long-term health problem with heart, lung, kidney, or metabolic disease (e.g., diabetes), asthma, a blood disorder, no spleen, complement component deficiency, a cochlear implant, or a spinal fluid leak?  Are you on long-term aspirin therapy?   No   Do you have cancer, leukemia, HIV/AIDS, or any other immune system problem?   Yes   Do you have a parent, brother, or sister with an immune system problem?   Yes   In the past 3 months, have you taken medications that affect  your immune system, such as prednisone, other steroids, or anticancer drugs; drugs for the treatment of rheumatoid arthritis, Crohn s disease, or psoriasis; or have you had radiation treatments?   Yes   Have you had a seizure, or a brain or other nervous system problem?   No   During the past year, have you received a transfusion of blood or blood    products, or been given immune (gamma) globulin or antiviral drug?   No   For women: Are you pregnant or is there a chance you could become       pregnant during the next month?   No   Have you received any vaccinations in the past 4 weeks?   No     Immunization questionnaire was positive for at least one answer.  Notified Dr. Nunez.      Patient instructed to remain in clinic for 15 minutes afterwards, and to report any adverse reactions.     Screening performed by Katrina Figueroa CMA on 7/3/2024 at 2:57 PM.

## 2024-07-03 NOTE — PROGRESS NOTES
Preventive Care Visit  [facility] PEGGY Nunez MD, MD, Internal Medicine  Jul 3, 2024        Tyler Akins is a 49 year old, presenting for the following:  Physical         Health Care Directive  Patient does not have a Health Care Directive or Living Will: Patient states has Advance Directive and will bring in a copy to clinic.    HPI        6/30/2024   General Health   How would you rate your overall physical health? Excellent   Feel stress (tense, anxious, or unable to sleep) Rather much      (!) STRESS CONCERN      6/30/2024   Nutrition   Three or more servings of calcium each day? (!) NO   Diet: Regular (no restrictions)   How many servings of fruit and vegetables per day? (!) 2-3   How many sweetened beverages each day? 0-1            6/30/2024   Exercise   Days per week of moderate/strenous exercise 7 days   Average minutes spent exercising at this level 70 min            6/30/2024   Social Factors   Frequency of gathering with friends or relatives More than three times a week   Worry food won't last until get money to buy more No   Food not last or not have enough money for food? No   Do you have housing? (Housing is defined as stable permanent housing and does not include staying ouside in a car, in a tent, in an abandoned building, in an overnight shelter, or couch-surfing.) Yes   Are you worried about losing your housing? No   Lack of transportation? No   Unable to get utilities (heat,electricity)? No            6/30/2024   Fall Risk   Fallen 2 or more times in the past year? No   Trouble with walking or balance? No             6/30/2024   Dental   Dentist two times every year? (!) NO                 Today's PHQ-2 Score:       7/3/2024     1:50 PM   PHQ-2 ( 1999 [employer])   Q1: Little interest or pleasure in doing things 0   Q2: Feeling down, depressed or hopeless 0   PHQ-2 Score 0         6/30/2024   Substance Use   Alcohol more than 3/day or more than 7/wk Not  Applicable   Do you use any other substances recreationally? No        Social History     Tobacco Use    Smoking status: Never    Smokeless tobacco: Never   Vaping Use    Vaping status: Never Used   Substance Use Topics    Alcohol use: Yes     Alcohol/week: 0.0 standard drinks of alcohol     Comment: < 1 drink / week    Drug use: No           8/9/2023   LAST FHS-7 RESULTS   1st degree relative breast or ovarian cancer No   Any relative bilateral breast cancer No   Any male have breast cancer No   Any ONE woman have BOTH breast AND ovarian cancer No   Any woman with breast cancer before 50yrs No   2 or more relatives with breast AND/OR ovarian cancer No   2 or more relatives with breast AND/OR bowel cancer No           Mammogram Screening - Mammogram every 1-2 years updated in Health Maintenance based on mutual decision making        6/30/2024   STI Screening   New sexual partner(s) since last STI/HIV test? No        History of abnormal Pap smear: YES - reflected in Problem List and Health Maintenance accordingly        Latest Ref Rng & Units 5/16/2024     3:09 PM 3/15/2023     2:18 PM 10/27/2020     5:47 PM   PAP / HPV   PAP  Atypical squamous cells of undetermined significance (ASC-US)  Negative for Intraepithelial Lesion or Malignancy (NILM)     PAP (Historical)    NIL    HPV 16 DNA Negative Negative  Negative     HPV 18 DNA Negative Positive  Negative     Other HR HPV Negative Positive  Positive       ASCVD Risk   The 10-year ASCVD risk score (Ollie SCHMITT, et al., 2019) is: 1.3%    Values used to calculate the score:      Age: 49 years      Sex: Female      Is Non- : No      Diabetic: No      Tobacco smoker: No      Systolic Blood Pressure: 120 mmHg      Is BP treated: Yes      HDL Cholesterol: 59 mg/dL      Total Cholesterol: 177 mg/dL         Reviewed and updated as needed this visit by Provider                    Past Medical History:   Diagnosis Date    Allergic rhinitis, cause  "unspecified     Depressive disorder, not elsewhere classified     Hypertension 01/01/2008    Other psoriasis          Review of Systems  Constitutional, HEENT, cardiovascular, pulmonary - recent bronchitis improved, GI, , musculoskeletal, neuro, skin, endocrine and psych systems are negative, except as otherwise noted.     Objective    Exam  /79 (BP Location: Right arm, Patient Position: Sitting, Cuff Size: Adult Regular)   Pulse 66   Temp 98.2  F (36.8  C) (Temporal)   Resp 18   Ht 1.575 m (5' 2\")   Wt 67.4 kg (148 lb 9.6 oz)   SpO2 97%   BMI 27.18 kg/m     Estimated body mass index is 27.18 kg/m  as calculated from the following:    Height as of this encounter: 1.575 m (5' 2\").    Weight as of this encounter: 67.4 kg (148 lb 9.6 oz).    Physical Exam  GENERAL: alert and no distress  EYES: Eyes grossly normal to inspection, PERRL and conjunctivae and sclerae normal  HENT: ear canals and TM's normal, nose and mouth without ulcers or lesions  NECK: no adenopathy, no asymmetry, masses, or scars  RESP: lungs clear to auscultation - no rales, rhonchi or wheezes  CV: regular rate and rhythm, normal S1 S2, no S3 or S4, no murmur, click or rub, no peripheral edema  ABDOMEN: soft, nontender, no hepatosplenomegaly, no masses and bowel sounds normal  MS: no gross musculoskeletal defects noted, no edema  SKIN: no suspicious lesions or rashes  NEURO: Normal strength and tone, mentation intact and speech normal  PSYCH: mentation appears normal, affect normal/bright    Patient Instructions   (Z00.00) Routine general medical examination at a health care facility  (primary encounter diagnosis)  Comment: For routine exam, we will draw labs as ordered, cholesterol, diabetes mellitus check, liver function, renal function, and refer for colonoscopy and mammogram -  [facility] (also performs diagnostic mammogram, ultrasound and biopsy) [phone]..  We will also update vaccination history.  Plan: " Hepatitis B Surface Antibody, HEPATITIS A 19+         (HAVRIX/VAQTA), Lipid panel reflex to direct         LDL Non-fasting, Comprehensive metabolic panel,        CBC with platelets            (Z12.11) Screen for colon cancer  Comment:   Plan: Colonoscopy Screening  Referral            (F31.76) Bipolar disorder, in full remission, most recent episode depressed (H24)  Comment: Continue on lurasidone   Plan: lurasidone (LATUDA) 20 MG TABS tablet,         traZODone (DESYREL) 50 MG tablet            (I10) Benign essential hypertension  Comment: blood pressure is excellent with current blood pressure medications   Plan: amLODIPine-benazepril (LOTREL) 5-10 MG capsule            (J40) Bronchitis  Comment: OK to continue inhalers   Plan:     (Z12.31) Encounter for screening mammogram for breast cancer  Comment:  [facility] (also performs diagnostic mammogram, ultrasound and biopsy) [phone].   Plan: MA Screen Bilateral w/Alok                  Signed Electronically by: Marco Nunez MD, MD

## 2024-07-03 NOTE — PATIENT INSTRUCTIONS
(Z00.00) Routine general medical examination at a health care facility  (primary encounter diagnosis)  Comment: For routine exam, we will draw labs as ordered, cholesterol, diabetes mellitus check, liver function, renal function, and refer for colonoscopy and mammogram -  [facility] (also performs diagnostic mammogram, ultrasound and biopsy) [phone]..  We will also update vaccination history.  Plan: Hepatitis B Surface Antibody, HEPATITIS A 19+         (HAVRIX/VAQTA), Lipid panel reflex to direct         LDL Non-fasting, Comprehensive metabolic panel,        CBC with platelets            (Z12.11) Screen for colon cancer  Comment:   Plan: Colonoscopy Screening  Referral            (F31.76) Bipolar disorder, in full remission, most recent episode depressed (H24)  Comment: Continue on lurasidone   Plan: lurasidone (LATUDA) 20 MG TABS tablet,         traZODone (DESYREL) 50 MG tablet            (I10) Benign essential hypertension  Comment: blood pressure is excellent with current blood pressure medications   Plan: amLODIPine-benazepril (LOTREL) 5-10 MG capsule            (J40) Bronchitis  Comment: OK to continue inhalers   Plan:     (Z12.31) Encounter for screening mammogram for breast cancer  Comment:  [facility] (also performs diagnostic mammogram, ultrasound and biopsy) [phone].   Plan: MA Screen Bilateral w/Alok

## 2024-07-04 LAB
ALBUMIN SERPL BCG-MCNC: 4.1 G/DL (ref 3.5–5.2)
ALP SERPL-CCNC: 97 U/L (ref 40–150)
ALT SERPL W P-5'-P-CCNC: 14 U/L (ref 0–50)
ANION GAP SERPL CALCULATED.3IONS-SCNC: 8 MMOL/L (ref 7–15)
AST SERPL W P-5'-P-CCNC: 22 U/L (ref 0–45)
BILIRUB SERPL-MCNC: 0.3 MG/DL
BUN SERPL-MCNC: 11.6 MG/DL (ref 6–20)
CALCIUM SERPL-MCNC: 9.1 MG/DL (ref 8.6–10)
CHLORIDE SERPL-SCNC: 103 MMOL/L (ref 98–107)
CHOLEST SERPL-MCNC: 162 MG/DL
CREAT SERPL-MCNC: 0.64 MG/DL (ref 0.51–0.95)
DEPRECATED HCO3 PLAS-SCNC: 27 MMOL/L (ref 22–29)
EGFRCR SERPLBLD CKD-EPI 2021: >90 ML/MIN/1.73M2
FASTING STATUS PATIENT QL REPORTED: NO
FASTING STATUS PATIENT QL REPORTED: NO
GLUCOSE SERPL-MCNC: 85 MG/DL (ref 70–99)
HDLC SERPL-MCNC: 53 MG/DL
LDLC SERPL CALC-MCNC: 89 MG/DL
NONHDLC SERPL-MCNC: 109 MG/DL
POTASSIUM SERPL-SCNC: 4.4 MMOL/L (ref 3.4–5.3)
PROT SERPL-MCNC: 8.2 G/DL (ref 6.4–8.3)
SODIUM SERPL-SCNC: 138 MMOL/L (ref 135–145)
TRIGL SERPL-MCNC: 98 MG/DL

## 2024-07-04 NOTE — RESULT ENCOUNTER NOTE
Carson Gaviria,    I had the opportunity to review your recent labs and a summary of your labs reads as follows:    Your complete blood counts show no sign of anemia, normal white blood cell count and platelets.  Your comprehensive metabolic panel showed normal renal function, normal liver function, and normal fasting blood glucose indicating no evidence of diabetes mellitus.  Your fasting lipid panel show  - normal HDL (good) cholesterol -as your goal is greater than 40  - low LDL (bad) cholesterol as your goal is less than 130  - normal triglyceride levels  Your Hepatitis B surface antibody returned non-reactive indicating that you would be recommended to have hepatitis B vaccination      Sincerely,  Marco Nunez MD

## 2024-07-10 ENCOUNTER — PATIENT OUTREACH (OUTPATIENT)
Dept: CARE COORDINATION | Facility: CLINIC | Age: 49
End: 2024-07-10
Payer: COMMERCIAL

## 2024-08-07 ENCOUNTER — PATIENT OUTREACH (OUTPATIENT)
Dept: CARE COORDINATION | Facility: CLINIC | Age: 49
End: 2024-08-07
Payer: COMMERCIAL

## 2024-08-14 ENCOUNTER — HOSPITAL ENCOUNTER (OUTPATIENT)
Dept: MAMMOGRAPHY | Facility: CLINIC | Age: 49
Discharge: HOME | End: 2024-08-14
Attending: INTERNAL MEDICINE | Admitting: INTERNAL MEDICINE
Payer: COMMERCIAL

## 2024-08-14 DIAGNOSIS — Z12.31 ENCOUNTER FOR SCREENING MAMMOGRAM FOR BREAST CANCER: ICD-10-CM

## 2024-08-14 PROCEDURE — 77063 BREAST TOMOSYNTHESIS BI: CPT

## 2024-08-28 ENCOUNTER — OFFICE VISIT (OUTPATIENT)
Dept: URGENT CARE | Facility: URGENT CARE | Age: 49
End: 2024-08-28
Payer: COMMERCIAL

## 2024-08-28 VITALS
TEMPERATURE: 97.3 F | HEART RATE: 68 BPM | OXYGEN SATURATION: 97 % | SYSTOLIC BLOOD PRESSURE: 118 MMHG | WEIGHT: 149 LBS | DIASTOLIC BLOOD PRESSURE: 78 MMHG | BODY MASS INDEX: 27.25 KG/M2

## 2024-08-28 DIAGNOSIS — N39.0 ACUTE UTI: Primary | ICD-10-CM

## 2024-08-28 LAB
ALBUMIN UR-MCNC: NEGATIVE MG/DL
APPEARANCE UR: CLEAR
BACTERIA #/AREA URNS HPF: ABNORMAL /HPF
BILIRUB UR QL STRIP: NEGATIVE
CLUE CELLS: ABNORMAL
COLOR UR AUTO: ABNORMAL
GLUCOSE UR STRIP-MCNC: NEGATIVE MG/DL
HGB UR QL STRIP: ABNORMAL
KETONES UR STRIP-MCNC: NEGATIVE MG/DL
LEUKOCYTE ESTERASE UR QL STRIP: ABNORMAL
NITRATE UR QL: NEGATIVE
PH UR STRIP: 6 [PH] (ref 5–7)
RBC #/AREA URNS AUTO: ABNORMAL /HPF
SP GR UR STRIP: <=1.005 (ref 1–1.03)
SQUAMOUS #/AREA URNS AUTO: ABNORMAL /LPF
TRICHOMONAS, WET PREP: ABNORMAL
UROBILINOGEN UR STRIP-ACNC: 0.2 E.U./DL
WBC #/AREA URNS AUTO: ABNORMAL /HPF
WBC'S/HIGH POWER FIELD, WET PREP: ABNORMAL
YEAST, WET PREP: ABNORMAL

## 2024-08-28 PROCEDURE — 87491 CHLMYD TRACH DNA AMP PROBE: CPT | Performed by: EMERGENCY MEDICINE

## 2024-08-28 PROCEDURE — 81001 URINALYSIS AUTO W/SCOPE: CPT | Performed by: EMERGENCY MEDICINE

## 2024-08-28 PROCEDURE — 87086 URINE CULTURE/COLONY COUNT: CPT | Performed by: EMERGENCY MEDICINE

## 2024-08-28 PROCEDURE — 99214 OFFICE O/P EST MOD 30 MIN: CPT | Performed by: EMERGENCY MEDICINE

## 2024-08-28 PROCEDURE — 87210 SMEAR WET MOUNT SALINE/INK: CPT

## 2024-08-28 PROCEDURE — 87591 N.GONORRHOEAE DNA AMP PROB: CPT | Performed by: EMERGENCY MEDICINE

## 2024-08-28 RX ORDER — NITROFURANTOIN 25; 75 MG/1; MG/1
100 CAPSULE ORAL 2 TIMES DAILY
Qty: 10 CAPSULE | Refills: 0 | Status: SHIPPED | OUTPATIENT
Start: 2024-08-28 | End: 2024-09-02

## 2024-08-28 NOTE — PROGRESS NOTES
Assessment & Plan     Diagnosis:    ICD-10-CM    1. Acute UTI  N39.0 UA Macroscopic with reflex to Microscopic and Culture - Clinic Collect     Wet prep - Clinic Collect     UA Microscopic with Reflex to Culture     Chlamydia trachomatis/Neisseria gonorrhoeae by PCR - Clinic Collect     nitroFURantoin macrocrystal-monohydrate (MACROBID) 100 MG capsule     Urine Culture Aerobic Bacterial - lab collect     Urine Culture Aerobic Bacterial - lab collect          Medical Decision Making:  Everette Mayen is a 49 year old female who presents to clinic today for evaluation of urinary frequency, urgency and dysuria.     This clinically is consistent with a urinary tract infection.  Urinalysis is questionable for infection but she states that she drank a copious amount of water this morning and may have diluted the sample. There has been no fever, confusion, flank pain or significant abdominal pain.  The patient is not concerned about STDs and testing not indicated. There is no clinical evidence of pyelonephritis, appendicitis, colitis, diverticulitis or any intraabdominal catastrophe. The patient will be started on antibiotics for the infection. Go to the ER if increasing pain, vomiting, fever, or inability to tolerate the oral antibiotic.  Follow up with primary physician is indicated if not improving in 2-3 days.     KYLEE Eduardo [facility]    Subjective     Everette Mayen is a 49 year old female who presents to clinic today for the following health issues:  Chief Complaint   Patient presents with    UTI     Pt states since Monday night she has had frequent urgency to urinate and pain.         HPI  Patient states that for the past 2 days they experienced a burning sensation, and frequency and urgency of urination. This was improving with drinking a copious amount of water; but is coming back stronger this morning and more painful.  No vaginal bleeding or discharge or concerns for STDs,  but he did reconnect with the prior partner and would not mind to be checked.  Patient denies any flank or back pain, abdominal pain, fever, nausea, vomiting, diarrhea, inability to urinate. No concerns for pregnancy.    Review of Systems    See HPI    Objective      Vitals:    Patient Vitals for the past 24 hrs:   BP Temp Temp src Pulse SpO2 Weight   08/28/24 1047 118/78 97.3  F (36.3  C) Tympanic 68 97 % 67.6 kg (149 lb)         Vital signs reviewed by: Tushar Uribe PA-C    Physical Exam   Constitutional: The patient is oriented to person, place, and time. Alert and cooperative. No acute distress.  Mouth: Mucous membranes are moist.  Cardiovascular: Regular rate and rhythm.  Pulmonary/Chest: Effort normal. No respiratory distress.   GI: Abdomen is soft, non-tender and non-distended throughout. No rebound or guarding. No McBurney point tenderness.   MSK: No CVA tenderness bilaterally.  Neurological: Alert and oriented x3.     Labs/Imaging:  Results for orders placed or performed in visit on 08/28/24   UA Macroscopic with reflex to Microscopic and Culture - Clinic Collect     Status: Abnormal    Specimen: Urine, Clean Catch   Result Value Ref Range    Color Urine Straw Colorless, Straw, Light Yellow, Yellow    Appearance Urine Clear Clear    Glucose Urine Negative Negative mg/dL    Bilirubin Urine Negative Negative    Ketones Urine Negative Negative mg/dL    Specific Gravity Urine <=1.005 1.003 - 1.035    Blood Urine Small (A) Negative    pH Urine 6.0 5.0 - 7.0    Protein Albumin Urine Negative Negative mg/dL    Urobilinogen Urine 0.2 0.2, 1.0 E.U./dL    Nitrite Urine Negative Negative    Leukocyte Esterase Urine Trace (A) Negative   UA Microscopic with Reflex to Culture     Status: Abnormal   Result Value Ref Range    Bacteria Urine Few (A) None Seen /HPF    RBC Urine 0-2 0-2 /HPF /HPF    WBC Urine 0-5 0-5 /HPF /HPF    Squamous Epithelials Urine Few (A) None Seen /LPF    Narrative    Urine Culture not indicated    Wet prep - Clinic Collect     Status: Abnormal    Specimen: Vagina; Swab   Result Value Ref Range    Trichomonas Absent Absent    Yeast Absent Absent    Clue Cells Absent Absent    WBCs/high power field 3+ (A) None     GC/Chlamydia PCR: Pending      Tushar Uribe PA-C, August 28, 2024

## 2024-08-29 LAB
BACTERIA UR CULT: NO GROWTH
C TRACH DNA SPEC QL PROBE+SIG AMP: NEGATIVE
N GONORRHOEA DNA SPEC QL NAA+PROBE: NEGATIVE

## 2024-10-28 ENCOUNTER — MYC MEDICAL ADVICE (OUTPATIENT)
Dept: FAMILY MEDICINE | Facility: CLINIC | Age: 49
End: 2024-10-28
Payer: COMMERCIAL

## 2024-10-29 NOTE — TELEPHONE ENCOUNTER
Triage Patient Outreach    Attempt # 1    Was call answered?  No.  Left voicemail to return call to Triage at Primary Clinic and sent [employer] message.     Allie Perez RN

## 2024-12-26 ENCOUNTER — OFFICE VISIT (OUTPATIENT)
Dept: URGENT CARE | Facility: URGENT CARE | Age: 49
End: 2024-12-26
Payer: COMMERCIAL

## 2024-12-26 VITALS
WEIGHT: 160 LBS | HEART RATE: 81 BPM | SYSTOLIC BLOOD PRESSURE: 141 MMHG | OXYGEN SATURATION: 98 % | TEMPERATURE: 97.8 F | RESPIRATION RATE: 18 BRPM | BODY MASS INDEX: 29.26 KG/M2 | DIASTOLIC BLOOD PRESSURE: 92 MMHG

## 2024-12-26 DIAGNOSIS — R30.0 DYSURIA: Primary | ICD-10-CM

## 2024-12-26 DIAGNOSIS — N39.0 URINARY TRACT INFECTION WITHOUT HEMATURIA, SITE UNSPECIFIED: ICD-10-CM

## 2024-12-26 LAB
ALBUMIN UR-MCNC: NEGATIVE MG/DL
APPEARANCE UR: CLEAR
BACTERIA #/AREA URNS HPF: ABNORMAL /HPF
BILIRUB UR QL STRIP: NEGATIVE
CLUE CELLS: ABNORMAL
COLOR UR AUTO: YELLOW
GLUCOSE UR STRIP-MCNC: NEGATIVE MG/DL
HGB UR QL STRIP: ABNORMAL
KETONES UR STRIP-MCNC: NEGATIVE MG/DL
LEUKOCYTE ESTERASE UR QL STRIP: ABNORMAL
NITRATE UR QL: NEGATIVE
PH UR STRIP: 6 [PH] (ref 5–7)
RBC #/AREA URNS AUTO: ABNORMAL /HPF
SP GR UR STRIP: 1.02 (ref 1–1.03)
SQUAMOUS #/AREA URNS AUTO: ABNORMAL /LPF
TRICHOMONAS, WET PREP: ABNORMAL
UROBILINOGEN UR STRIP-ACNC: 0.2 E.U./DL
WBC #/AREA URNS AUTO: ABNORMAL /HPF
WBC'S/HIGH POWER FIELD, WET PREP: ABNORMAL
YEAST, WET PREP: ABNORMAL

## 2024-12-26 RX ORDER — DESONIDE 0.5 MG/G
CREAM TOPICAL
COMMUNITY
Start: 2024-12-19

## 2024-12-26 RX ORDER — NITROFURANTOIN 25; 75 MG/1; MG/1
100 CAPSULE ORAL 2 TIMES DAILY
Qty: 10 CAPSULE | Refills: 0 | Status: SHIPPED | OUTPATIENT
Start: 2024-12-26 | End: 2024-12-31

## 2024-12-26 RX ORDER — KETOCONAZOLE 20 MG/G
CREAM TOPICAL
COMMUNITY
Start: 2024-12-19

## 2024-12-26 NOTE — PROGRESS NOTES
SUBJECTIVE: Everette Mayen is a 49 year old female who  presents today for a possible UTI.   Symptoms of dysuria and frequency have been going on for this a.    Hematuria no.  still present  Past Medical History:   Diagnosis Date    Allergic rhinitis, cause unspecified     Depressive disorder, not elsewhere classified     Hypertension 01/01/2008    Other psoriasis      Allergies   Allergen Reactions    Sulfa Antibiotics Hives     Lupus Erythematous    Sulfacetamide Hives and Itching     Lupus Erythematous    Lamotrigine Rash    Lamotrigine Rash     Social History     Tobacco Use    Smoking status: Never    Smokeless tobacco: Never   Substance Use Topics    Alcohol use: Yes     Alcohol/week: 0.0 standard drinks of alcohol     Comment: < 1 drink / week       ROS: CONSTITUTIONAL:NEGATIVE for fever, chills, change in weight    OBJECTIVE:  BP (!) 141/92   Pulse 81   Temp 97.8  F (36.6  C) (Tympanic)   Resp 18   Wt 72.6 kg (160 lb)   SpO2 98%   BMI 29.26 kg/m      No Flank/abd pain      ICD-10-CM    1. Dysuria  R30.0 UA Macroscopic with reflex to Microscopic and Culture - Clinic Collect     Wet prep - Clinic Collect     UA Microscopic with Reflex to Culture      2. Urinary tract infection without hematuria, site unspecified  N39.0 nitroFURantoin macrocrystal-monohydrate (MACROBID) 100 MG capsule          Drink plenty of fluids.  Prevention and treatment of UTI's discussed.Signs and symptoms of pyelonephritis mentioned.  Follow up with primary care physician if not improving

## 2025-01-06 ENCOUNTER — TELEPHONE (OUTPATIENT)
Dept: GASTROENTEROLOGY | Facility: CLINIC | Age: 50
End: 2025-01-06
Payer: COMMERCIAL

## 2025-01-06 NOTE — TELEPHONE ENCOUNTER
Pre assessment completed for upcoming procedure.   (Please see previous telephone encounter notes for complete details)      Procedure details:    Arrival time and facility location reviewed.    Pre op exam needed? No.    Designated  policy reviewed. Instructed to have someone stay 6 hours post procedure.       Medication review:    Medications reviewed. Please see supporting documentation below. Holding recommendations discussed (if applicable).   Ferrous Sulfate (iron supplement): HOLD 7 days before procedure.      Prep for procedure:     Patient declined to review procedure prep instructions on the phone. Instructed patient to review the procedure prep instructions at least 7 days prior to procedure due to necessary dietary modifications. NPO instructions reviewed.    Patient was instructed to call if any questions or concerns arise.       Any additional information needed:  N/A      Patient verbalized understanding and had no questions or concerns at this time.      Ashly Juan RN  Endoscopy Procedure Pre Assessment   [phone] option 2

## 2025-01-06 NOTE — TELEPHONE ENCOUNTER
"Add on/Rescheduled Procedure  Previously cancelled in 2023 due to:  \"scared to do the procedure\"    Pre visit planning completed.      Procedure details:    Patient scheduled for Colonoscopy on 1/14/25.     Arrival time: 1445. Procedure time 1530    Facility [facility]: [facility]; [facility] [place] [address]. Check in location: 1st Floor [facility].     Sedation type: Conscious sedation     Pre op exam needed? No.    Indication for procedure: Screening      Chart review:     Electronic implanted devices? No    Recent diagnosis of diverticulitis within the last 6 weeks? No      Medication review:    Diabetic? No    Anticoagulants? No    Weight loss medication/injectable? No GLP-1 medication per patient's medication list. Nursing to verify with pre-assessment call.    Other medication HOLDING recommendations:  N/A      Prep for procedure:     Bowel prep recommendation: Standard Miralax.   Due to: standard bowel prep    Procedure information and instructions sent via ADITU SASabdirashid Juan RN  Endoscopy Procedure Pre Assessment   [phone] option 2  "

## 2025-01-07 ENCOUNTER — ALLIED HEALTH/NURSE VISIT (OUTPATIENT)
Dept: FAMILY MEDICINE | Facility: CLINIC | Age: 50
End: 2025-01-07
Payer: COMMERCIAL

## 2025-01-07 DIAGNOSIS — Z23 NEED FOR VACCINATION: ICD-10-CM

## 2025-01-07 DIAGNOSIS — Z23 ENCOUNTER FOR IMMUNIZATION: Primary | ICD-10-CM

## 2025-01-07 PROCEDURE — 90472 IMMUNIZATION ADMIN EACH ADD: CPT

## 2025-01-07 PROCEDURE — 90746 HEPB VACCINE 3 DOSE ADULT IM: CPT

## 2025-01-07 PROCEDURE — 90471 IMMUNIZATION ADMIN: CPT

## 2025-01-07 PROCEDURE — 90750 HZV VACC RECOMBINANT IM: CPT

## 2025-01-07 NOTE — PROGRESS NOTES
Prior to immunization administration, verified patients identity using patient s name and date of birth. Please see Immunization Activity for additional information.     Is the patient's temperature normal (100.5 or less)? Yes     Patient MEETS CRITERIA. PROCEED with vaccine administration.          1/7/2025   Hepatitis B   Have you had a serious reaction to a hepatitis B vaccine or to something in a hepatitis B vaccine, including yeast)? No   Are you getting kidney dialysis (either peritoneal or hemodialysis)? No   Do you have an allergy to latex? No         Patient MEETS CRITERIA. PROCEED with vaccine administration.            1/7/2025   Zoster   Have you had a serious reaction to the shingles vaccine or something in the shingles vaccine? No   Do you have shingles now? No   Are you getting treatment for cancer, organ transplant, or bone marrow transplant? No   Do you have an autoimmune or inflammatory condition? !YES   Is the patient immunocompromised and wanting to complete the Shingles vaccine series in less than 2 months? No   Have you had Guillain-Shenandoah syndrome within 6 weeks of getting a vaccine? No         DO NOT VACCINATE. Patient is NOT eligible for vaccination. Discuss with provider at upcoming appointment if they have questions.      Patient instructed to remain in clinic for 15 minutes afterwards, and to report any adverse reactions.      Link to Ancillary Visit Immunization Standing Orders SmartSet     Screening performed by Schuyler Pichardo CMA on 1/7/2025 at 2:09 PM.

## 2025-01-14 ENCOUNTER — HOSPITAL ENCOUNTER (OUTPATIENT)
Facility: CLINIC | Age: 50
Discharge: HOME | End: 2025-01-14
Attending: COLON & RECTAL SURGERY | Admitting: COLON & RECTAL SURGERY
Payer: COMMERCIAL

## 2025-01-14 VITALS
OXYGEN SATURATION: 97 % | SYSTOLIC BLOOD PRESSURE: 135 MMHG | DIASTOLIC BLOOD PRESSURE: 58 MMHG | RESPIRATION RATE: 16 BRPM | HEART RATE: 59 BPM

## 2025-01-14 LAB — COLONOSCOPY: NORMAL

## 2025-01-14 PROCEDURE — 45385 COLONOSCOPY W/LESION REMOVAL: CPT | Performed by: COLON & RECTAL SURGERY

## 2025-01-14 PROCEDURE — 88305 TISSUE EXAM BY PATHOLOGIST: CPT | Mod: TC | Performed by: COLON & RECTAL SURGERY

## 2025-01-14 PROCEDURE — 250N000011 HC RX IP 250 OP 636: Performed by: COLON & RECTAL SURGERY

## 2025-01-14 PROCEDURE — G0500 MOD SEDAT ENDO SERVICE >5YRS: HCPCS | Performed by: COLON & RECTAL SURGERY

## 2025-01-14 PROCEDURE — 88305 TISSUE EXAM BY PATHOLOGIST: CPT | Mod: 26 | Performed by: PATHOLOGY

## 2025-01-14 RX ORDER — LIDOCAINE 40 MG/G
CREAM TOPICAL
Status: DISCONTINUED | OUTPATIENT
Start: 2025-01-14 | End: 2025-01-14 | Stop reason: HOSPADM

## 2025-01-14 RX ORDER — FENTANYL CITRATE 50 UG/ML
INJECTION, SOLUTION INTRAMUSCULAR; INTRAVENOUS PRN
Status: DISCONTINUED | OUTPATIENT
Start: 2025-01-14 | End: 2025-01-14 | Stop reason: HOSPADM

## 2025-01-14 RX ORDER — ACETAMINOPHEN 325 MG/1
325-650 TABLET ORAL EVERY 6 HOURS PRN
COMMUNITY

## 2025-01-14 RX ORDER — ONDANSETRON 2 MG/ML
4 INJECTION INTRAMUSCULAR; INTRAVENOUS
Status: DISCONTINUED | OUTPATIENT
Start: 2025-01-14 | End: 2025-01-14 | Stop reason: HOSPADM

## 2025-01-14 ASSESSMENT — ACTIVITIES OF DAILY LIVING (ADL)
ADLS_ACUITY_SCORE: 41
ADLS_ACUITY_SCORE: 41

## 2025-01-14 NOTE — H&P
Colon & Rectal Surgery History and Physical  Pre-Endoscopy Procedure Note    History of Present Illness   I have been asked by Dr. Nunez to evaluate this 49 year old female for colorectal cancer screening. She currently denies any abdominal pain, weight loss, bleeding per rectum, or recent change in bowel habits.    Past Medical History  Diagnosis Date    Allergic rhinitis     Depressive disorder     Hypertension 01/01/2008    Other psoriasis        Past Surgical History  Procedure Laterality Date    ABDOMINOPLASTY  2022        Medications  Medications Prior to Admission   Medication Sig    adalimumab (HUMIRA) 40 MG/0.8ML prefilled syringe kit Inject 0.8 mLs (40 mg) Subcutaneous every 14 days    amLODIPine-benazepril (LOTREL) 5-10 MG capsule Take 1 capsule by mouth daily    clobetasol (TEMOVATE) 0.05 % external ointment Apply topically 2 times daily as needed (Psoriasis)    desonide (DESOWEN) 0.05 % external cream APPLY TWICE DAILY TO RED/SCALY AREAS ON FACE FOR UP TO 2 WEEKS ON AND 1-2 WEEKS OFF    fluticasone-salmeterol (ADVAIR) 250-50 MCG/ACT inhaler Inhale 1 puff into the lungs every 12 hours    ketoconazole (NIZORAL) 2 % external cream APPLY TO THE AFFECTED AREA ON FACE TWICE DAILY OK TO USE CONTINOUSLY    lurasidone (LATUDA) 20 MG TABS tablet Take 1 tablet (20 mg) by mouth daily with food    traZODone (DESYREL) 50 MG tablet Take 1 tablet (50 mg) by mouth at bedtime       Allergies  Allergen Reactions    Sulfa Antibiotics Hives     Lupus Erythematous    Lamotrigine Rash        Family History   Family history includes Alcohol/Drug use in her father; Allergies in her mother; Arthritis in her mother; Cardiovascular in her paternal grandfather; Cerebrovascular Disease in her maternal grandfather; Depression in her father and mother; Diabetes in her maternal grandmother; Eye Disorder in her maternal grandmother; Genetic Disorder in her son; Genitourinary Problems in her mother; Gynecology in her mother;  Hypertension in her mother; Other Cancer in an other family member; Seizure Disorder in her son; Substance Abuse in her father.     Social History   She reports that she has never smoked. She has never used smokeless tobacco. She reports current alcohol use. She reports current drug use. Drug: Marijuana.    Review of Systems   Constitutional:  No fever, weight change or fatigue.    Eyes:     No dry eyes or vision changes.   Ears/Nose/Throat/Neck:  No oral ulcers, sore throat or voice change.    Cardiovascular:   No palpitations, syncope, angina or edema.   Respiratory:    No chest pain, excessive sleepiness, shortness of breath or hemoptysis.    Gastrointestinal:   No abdominal pain, nausea, vomiting, diarrhea or heartburn.    Genitourinary:   No dysuria, hematuria, urinary retention or urinary frequency.   Musculoskeletal:  No joint swelling or arthralgias.    Dermatologic:  No skin rash or other skin changes.   Neurologic:    No focal weakness or numbness. No neuropathy.   Psychiatric:    No depression, anxiety, suicidal ideation, or paranoid ideation.   Endocrine:   No cold or heat intolerance, polydipsia, hirsutism, change in libido, or flushing.   Hematology/Lymphatic:  No bleeding or lymphadenopathy.    Allergy/Immunology:  No rhinitis or hives.     Physical Exam   Vitals:  Blood pressure 106/79, resp. rate 18, last menstrual period 12/20/2022, SpO2 96%, not currently breastfeeding.    General:  Alert and oriented to person, place and time   Airway: Normal oropharyngeal airway and neck mobility   Lungs:  Clear bilaterally   Heart:  Regular rate and rhythm   Abdomen: Soft, NT, ND, no masses   Extremities: Warm, good capillary refill    ASA Grade: II (mild systemic disease)    Impression: Cleared for use of conscious sedation for colorectal cancer screening    Plan: Proceed with colonoscopy     Shayla Diaz MD  [facility]  [phone]

## 2025-01-28 ENCOUNTER — PATIENT OUTREACH (OUTPATIENT)
Dept: GASTROENTEROLOGY | Facility: CLINIC | Age: 50
End: 2025-01-28
Payer: COMMERCIAL

## 2025-01-28 PROBLEM — D12.6 ADENOMATOUS COLON POLYP: Status: ACTIVE | Noted: 2025-01-28

## 2025-02-11 ENCOUNTER — ALLIED HEALTH/NURSE VISIT (OUTPATIENT)
Dept: FAMILY MEDICINE | Facility: CLINIC | Age: 50
End: 2025-02-11
Payer: COMMERCIAL

## 2025-02-11 VITALS — TEMPERATURE: 97.7 F

## 2025-02-11 DIAGNOSIS — Z23 NEED FOR VACCINATION: Primary | ICD-10-CM

## 2025-02-11 PROCEDURE — 90746 HEPB VACCINE 3 DOSE ADULT IM: CPT

## 2025-02-11 PROCEDURE — 90471 IMMUNIZATION ADMIN: CPT

## 2025-02-11 PROCEDURE — 99207 PR NO CHARGE NURSE ONLY: CPT

## 2025-04-01 ENCOUNTER — LAB REQUISITION (OUTPATIENT)
Dept: LAB | Facility: CLINIC | Age: 50
End: 2025-04-01
Payer: COMMERCIAL

## 2025-04-01 DIAGNOSIS — Z79.899 OTHER LONG TERM (CURRENT) DRUG THERAPY: ICD-10-CM

## 2025-04-01 LAB
ALT SERPL W P-5'-P-CCNC: 13 U/L (ref 0–50)
AST SERPL W P-5'-P-CCNC: 18 U/L (ref 0–45)
BASOPHILS # BLD AUTO: 0.1 10E3/UL (ref 0–0.2)
BASOPHILS NFR BLD AUTO: 1 %
EOSINOPHIL # BLD AUTO: 0.2 10E3/UL (ref 0–0.7)
EOSINOPHIL NFR BLD AUTO: 2 %
ERYTHROCYTE [DISTWIDTH] IN BLOOD BY AUTOMATED COUNT: 12.3 % (ref 10–15)
HCT VFR BLD AUTO: 42.4 % (ref 35–47)
HGB BLD-MCNC: 13.8 G/DL (ref 11.7–15.7)
IMM GRANULOCYTES # BLD: 0 10E3/UL
IMM GRANULOCYTES NFR BLD: 0 %
LYMPHOCYTES # BLD AUTO: 2.7 10E3/UL (ref 0.8–5.3)
LYMPHOCYTES NFR BLD AUTO: 33 %
MCH RBC QN AUTO: 28.2 PG (ref 26.5–33)
MCHC RBC AUTO-ENTMCNC: 32.5 G/DL (ref 31.5–36.5)
MCV RBC AUTO: 87 FL (ref 78–100)
MONOCYTES # BLD AUTO: 0.8 10E3/UL (ref 0–1.3)
MONOCYTES NFR BLD AUTO: 10 %
NEUTROPHILS # BLD AUTO: 4.5 10E3/UL (ref 1.6–8.3)
NEUTROPHILS NFR BLD AUTO: 54 %
NRBC # BLD AUTO: 0 10E3/UL
NRBC BLD AUTO-RTO: 0 /100
PLATELET # BLD AUTO: 419 10E3/UL (ref 150–450)
RBC # BLD AUTO: 4.89 10E6/UL (ref 3.8–5.2)
WBC # BLD AUTO: 8.2 10E3/UL (ref 4–11)

## 2025-04-01 PROCEDURE — 87389 HIV-1 AG W/HIV-1&-2 AB AG IA: CPT | Mod: ORL | Performed by: DERMATOLOGY

## 2025-04-01 PROCEDURE — 84450 TRANSFERASE (AST) (SGOT): CPT | Mod: ORL | Performed by: DERMATOLOGY

## 2025-04-01 PROCEDURE — 84460 ALANINE AMINO (ALT) (SGPT): CPT | Mod: ORL | Performed by: DERMATOLOGY

## 2025-04-01 PROCEDURE — 87340 HEPATITIS B SURFACE AG IA: CPT | Mod: ORL | Performed by: DERMATOLOGY

## 2025-04-01 PROCEDURE — 85025 COMPLETE CBC W/AUTO DIFF WBC: CPT | Mod: ORL | Performed by: DERMATOLOGY

## 2025-04-01 PROCEDURE — 86803 HEPATITIS C AB TEST: CPT | Mod: ORL | Performed by: DERMATOLOGY

## 2025-04-02 ENCOUNTER — LAB REQUISITION (OUTPATIENT)
Dept: LAB | Facility: CLINIC | Age: 50
End: 2025-04-02
Payer: COMMERCIAL

## 2025-04-02 DIAGNOSIS — Z79.899 OTHER LONG TERM (CURRENT) DRUG THERAPY: ICD-10-CM

## 2025-04-07 ENCOUNTER — LAB REQUISITION (OUTPATIENT)
Dept: LAB | Facility: CLINIC | Age: 50
End: 2025-04-07
Payer: COMMERCIAL

## 2025-04-07 DIAGNOSIS — Z79.899 OTHER LONG TERM (CURRENT) DRUG THERAPY: ICD-10-CM

## 2025-04-09 ENCOUNTER — LAB REQUISITION (OUTPATIENT)
Dept: LAB | Facility: CLINIC | Age: 50
End: 2025-04-09
Payer: COMMERCIAL

## 2025-04-09 DIAGNOSIS — Z79.899 OTHER LONG TERM (CURRENT) DRUG THERAPY: ICD-10-CM

## 2025-04-11 LAB
GAMMA INTERFERON BACKGROUND BLD IA-ACNC: 10 IU/ML
M TB IFN-G BLD-IMP: ABNORMAL
M TB IFN-G CD4+ BCKGRND COR BLD-ACNC: 0 IU/ML
MITOGEN IGNF BCKGRD COR BLD-ACNC: 0 IU/ML
MITOGEN IGNF BCKGRD COR BLD-ACNC: 0 IU/ML
QUANTIFERON MITOGEN: 10 IU/ML
QUANTIFERON NIL TUBE: 10 IU/ML
QUANTIFERON TB1 TUBE: 10 IU/ML
QUANTIFERON TB2 TUBE: 10

## 2025-05-01 ENCOUNTER — LAB REQUISITION (OUTPATIENT)
Dept: LAB | Facility: CLINIC | Age: 50
End: 2025-05-01
Payer: COMMERCIAL

## 2025-05-01 DIAGNOSIS — Z79.899 OTHER LONG TERM (CURRENT) DRUG THERAPY: ICD-10-CM

## 2025-05-01 PROCEDURE — 86481 TB AG RESPONSE T-CELL SUSP: CPT | Mod: ORL | Performed by: DERMATOLOGY

## 2025-05-01 PROCEDURE — 36415 COLL VENOUS BLD VENIPUNCTURE: CPT | Mod: ORL | Performed by: DERMATOLOGY

## 2025-05-02 LAB — QUANTIFERON MITOGEN: 10 IU/ML

## 2025-05-03 LAB
GAMMA INTERFERON BACKGROUND BLD IA-ACNC: 10 IU/ML
M TB IFN-G BLD-IMP: ABNORMAL
M TB IFN-G CD4+ BCKGRND COR BLD-ACNC: 0 IU/ML
MITOGEN IGNF BCKGRD COR BLD-ACNC: -0.91 IU/ML
MITOGEN IGNF BCKGRD COR BLD-ACNC: 0 IU/ML
QUANTIFERON NIL TUBE: 10 IU/ML
QUANTIFERON TB1 TUBE: 9.09 IU/ML
QUANTIFERON TB2 TUBE: 10

## 2025-05-11 ENCOUNTER — OFFICE VISIT (OUTPATIENT)
Dept: URGENT CARE | Facility: URGENT CARE | Age: 50
End: 2025-05-11
Payer: COMMERCIAL

## 2025-05-11 VITALS
HEART RATE: 88 BPM | RESPIRATION RATE: 20 BRPM | OXYGEN SATURATION: 98 % | SYSTOLIC BLOOD PRESSURE: 120 MMHG | TEMPERATURE: 98 F | BODY MASS INDEX: 28.52 KG/M2 | DIASTOLIC BLOOD PRESSURE: 60 MMHG | HEIGHT: 62 IN | WEIGHT: 155 LBS

## 2025-05-11 DIAGNOSIS — J01.90 ACUTE NON-RECURRENT SINUSITIS, UNSPECIFIED LOCATION: Primary | ICD-10-CM

## 2025-05-11 PROCEDURE — 99213 OFFICE O/P EST LOW 20 MIN: CPT | Performed by: FAMILY MEDICINE

## 2025-05-11 PROCEDURE — 3078F DIAST BP <80 MM HG: CPT | Performed by: FAMILY MEDICINE

## 2025-05-11 PROCEDURE — 3074F SYST BP LT 130 MM HG: CPT | Performed by: FAMILY MEDICINE

## 2025-05-11 RX ORDER — MUPIROCIN 20 MG/G
OINTMENT TOPICAL 2 TIMES DAILY
Qty: 15 G | Refills: 0 | Status: SHIPPED | OUTPATIENT
Start: 2025-05-11 | End: 2025-05-18

## 2025-05-11 ASSESSMENT — ENCOUNTER SYMPTOMS
FEVER: 0
SHORTNESS OF BREATH: 0

## 2025-05-11 NOTE — PROGRESS NOTES
Urgent Care Clinic Visit    Chief Complaint   Patient presents with    Sinus Problem     For last month with nasal congestion, crusting around nostrils               5/11/2025    10:17 AM   Additional Questions   Roomed by briana man   Accompanied by self

## 2025-05-11 NOTE — PROGRESS NOTES
"Assessment & Plan     Acute non-recurrent sinusitis, unspecified location  Symptoms x 1 month.  Start Augmentin, do not suspect pneumonia.  She also has some open lesion sores on the bottom of her nostril she will use topical mupirocin.  If no improvement after a week, recommend CT sinus study.  - amoxicillin-clavulanate (AUGMENTIN) 875-125 MG tablet  Dispense: 14 tablet; Refill: 0  - mupirocin (BACTROBAN) 2 % external ointment  Dispense: 15 g; Refill: 0             Return in about 1 week (around 5/18/2025) for If symptoms do not improve or gets worse..    Umberto Cabrales MD  [facility] URGENT CARE MARIA EUGENIA Akins is a 49 year old female who presents to clinic today for the following health issues:  Chief Complaint   Patient presents with    Sinus Problem     For last month with nasal congestion, crusting around nostrils         5/11/2025    10:17 AM   Additional Questions   Roomed by briana man   Accompanied by self     HPI    Patient is a 49-year-old female who presents with 1 month history of nasal congestion crusting, pressure and occasional blood clots from her nose.  She is concerned for sinus infection.  No sore throat, cough, shortness of breath.    Some open wounds on the bottom of her nostril.  Notably she does have a history of psoriasis on adalimumab.          Review of Systems   Constitutional:  Negative for fever.   Respiratory:  Negative for shortness of breath.    Cardiovascular:  Negative for chest pain.           Objective    /60   Pulse 88   Temp 98  F (36.7  C) (Oral)   Resp 20   Ht 1.575 m (5' 2\")   Wt 70.3 kg (155 lb)   LMP 12/20/2022 (Exact Date)   SpO2 98%   BMI 28.35 kg/m    Physical Exam  Vitals reviewed.   Constitutional:       Appearance: She is not ill-appearing.   HENT:      Nose: Congestion and rhinorrhea present.      Comments: Small 0.5 cm open sores on the medial inferior nostrils.  Cardiovascular:      Rate and Rhythm: Normal rate and regular " rhythm.   Pulmonary:      Effort: Pulmonary effort is normal.      Breath sounds: Normal breath sounds.

## 2025-05-18 NOTE — PROGRESS NOTES
"Follow-up Note on Referred Patient  Gynecologic Oncology HPV Clinic  [facility]      Date of visit: 2025        Self-referred      Primary Care Provider:  Marco Nunez  [address] [facility] [address]  [facility] [id number]       RE: Everette Mayen  : 1975  Language: English  Pronouns: she/her/hers       Reason for visit: non-16/18 hrHPV+ cervical cancer screening in the setting of immunosuppression.       History of Present Illness:   Everette Alejandro \"Mable\" Tiarra is a 49 year old self-referred to the Gynecologic Oncology HPV Clinic on 2023 for evaluation of a non-16/18 hrHPV+ cervical cancer screening result. Medical history significant for psoriasis on immunosuppressive medication.  History as follows:    *HPV vaccination status: Unvaccinated (NOTE: reports 3 doses in her 20s, but HPV vaccine not available at that time)    08, 09, 12: Pap test NILM.    16:  Pap test NILM, hrHPV-.     10/27/20: Pap test NILM, hrHPV-.    3/15/23: Pap test NILM, non-16/18 hrHPV+.  23: Cervical biopsy pathology negative for dysplasia/malignancy.     24: Pap test ASCUS, hrHPV18+, non-16/18 hrHPV+.   -Cervical biopsy pathology benign endocervical tissue.       Subjective:  Mable presents to the gyn onc HPV clinic for a scheduled follow-up visit, unaccompanied.   Mable reports an episode of bleeding which she describes as a period after 2 years of menopause. Otherwise Mable has no concerns.      Past Medical History:  Past Medical History:   Diagnosis Date    Allergic rhinitis, cause unspecified     Depressive disorder, not elsewhere classified     Hypertension 2008    Other psoriasis        Past Surgical History:  Past Surgical History:   Procedure Laterality Date    ABDOMINOPLASTY      COLONOSCOPY N/A 2025    Procedure: Colonoscopy;  Surgeon: Shayla Diaz MD;  Location:  GI         Gynecologic History:  Menopausal.   No history of hormone therapy.  No history " of STIs.  Currently sexually active, no dyspareunia, no postcoital bleeding.       Obstetric History:  OB History    Para Term  AB Living   1 0 0 0 1 0   SAB IAB Ectopic Multiple Live Births   0 0 0 0 0      # Outcome Date GA Lbr Souleymane/2nd Weight Sex Type Anes PTL Lv   1 AB                 Current Medications:   Current Outpatient Medications   Medication Sig Dispense Refill    acetaminophen (TYLENOL) 325 MG tablet Take 325-650 mg by mouth every 6 hours as needed for mild pain.      adalimumab (HUMIRA) 40 MG/0.8ML prefilled syringe kit Inject 0.8 mLs (40 mg) Subcutaneous every 14 days 2 each 5    amLODIPine-benazepril (LOTREL) 5-10 MG capsule Take 1 capsule by mouth daily 90 capsule 3    amoxicillin-clavulanate (AUGMENTIN) 875-125 MG tablet Take 1 tablet by mouth 2 times daily. 14 tablet 0    clobetasol (TEMOVATE) 0.05 % external ointment Apply topically 2 times daily as needed (Psoriasis) 60 g 11    desonide (DESOWEN) 0.05 % external cream APPLY TWICE DAILY TO RED/SCALY AREAS ON FACE FOR UP TO 2 WEEKS ON AND 1-2 WEEKS OFF      fluticasone-salmeterol (ADVAIR) 250-50 MCG/ACT inhaler Inhale 1 puff into the lungs every 12 hours 60 each 3    ketoconazole (NIZORAL) 2 % external cream APPLY TO THE AFFECTED AREA ON FACE TWICE DAILY OK TO USE CONTINOUSLY      lurasidone (LATUDA) 20 MG TABS tablet Take 1 tablet (20 mg) by mouth daily with food 90 tablet 3    traZODone (DESYREL) 50 MG tablet Take 1 tablet (50 mg) by mouth at bedtime 90 tablet 3     No current facility-administered medications for this visit.        Allergies:   Allergies   Allergen Reactions    Sulfa Antibiotics Hives     Lupus Erythematous    Sulfacetamide Hives and Itching     Lupus Erythematous    Lamotrigine Rash    Lamotrigine Rash         Social History:  Patient lives independently. 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 She does not have Advanced Directives, but has identified her ex-spouse Hussein Downs as her desired Healthcare Power  of .    Social History     Tobacco Use    Smoking status: Never    Smokeless tobacco: Never   Substance Use Topics    Alcohol use: Yes     Alcohol/week: 0.0 standard drinks of alcohol     Comment: < 1 drink / week       History   Drug Use    Types: Marijuana     Comment: THC       Family History:   No known family history of breast, ovarian, uterine, colon, urothelial/renal, prostate or pancreatic cancers.  No known family history of melanoma.   Family History   Problem Relation Age of Onset    Hypertension Mother     Allergies Mother     Arthritis Mother         psoriasis    Depression Mother     Genitourinary Problems Mother         urinary incontinence    Gynecology Mother         hyst age 48    Substance Abuse Father     Alcohol/Drug Father     Depression Father     Diabetes Maternal Grandmother     Eye Disorder Maternal Grandmother     Cerebrovascular Disease Maternal Grandfather     Cardiovascular Paternal Grandfather     Genetic Disorder Son         No further genetic testing planned, microcephaly    Seizure Disorder Son          age 11    Other Cancer Other     Melanoma No family hx of     Skin Cancer No family hx of     Coronary Artery Disease No family hx of     Hyperlipidemia No family hx of     Breast Cancer No family hx of     Colon Cancer No family hx of     Prostate Cancer No family hx of     Anxiety Disorder No family hx of     Anesthesia Reaction No family hx of     Asthma No family hx of     Osteoporosis No family hx of     Thyroid Disease No family hx of        Physical Exam:   /82 (BP Location: Left arm, Patient Position: Sitting, Cuff Size: Adult Large)   Pulse 76   Temp 98.1  F (36.7  C) (Oral)   Resp 20   Wt 73.3 kg (161 lb 11.2 oz)   LMP 2022 (Exact Date)   SpO2 98%   BMI 29.58 kg/m    Body mass index is 29.58 kg/m .    General Appearance: healthy and alert, no distress     Musculoskeletal: extremities non tender and without edema    Skin: +psoriatic  lesions.    Neurological: no gross defects     Psychiatric: appropriate mood and affect                               Genitourinary: External genitalia and urethral meatus appears normal.  Vagina and cervix are grossly normal. Bimanual exam reveal no masses, nodularity or fullness.  Recto-vaginal exam confirms these findings.  Pap & HPV co-test specimen obtained.   Verbal consent obtained from the patient for the pelvic exam.   Each step of the exam was verbalized throughout.  Present for the exam: Donnie Shelton MD (PGY4)--exam and procedure performed by Dr. Shelton under my direct supervision.        Procedure Risk Statement:   The patient was counseled regarding risks, benefits and alternatives to colposcopy, possible biopsies.; endometrial biopsy. We also discussed guideline-adherent option of Pap and HPV test today, with colposcopy only as indicated--Mable prefers concomitant colposcopy given immunosuppression, increased risk of persistent HPV and increased risk of HPV-associated disease.  Risks discussed include but not limited to:  Bleeding; infection; injury to adjacent organs; inadequate sample or false negative result.  The patient's questions were answered, and informed consent signed.       Procedure:   Colposcopy:  After the informed consent was signed and time-out procedure was performed, dilute acetic acid was applied to the cervix. Colposcopy performed.  -Squamocolumnar junction fully visualized? No  -Acetowhite changes? None  -Punctations, mosaicism, abnormal vasculature, other abnormalities? None  -Clinical Impression: Normal. *  -Specimens: Endocervical curettings.    Sharp curettage performed with Kavorkian curette, followed by endocervical brushing.      Endometrial biopsy:  After patient identification was confirmed and informed consent was signed, a bivalve speculum was placed in the vagina for adequate visualization of the cervix.  The cervix and vagina were prepped with betadine.  Endometrial  biopsy was obtained with 1 passes, with moderate tissue obtained.  The speculum was then removed.  The patient tolerated the procedure well.       Performance Status:  ECOG Grade 0.       Assessment:  Mable Mayen is a 49 year old  woman with a diagnosis of persistent non-16/18 hrHPV+ cervical cancer screening.   Repeat Pap & HPV co-test results pending.  Colposcopic impression normal, pathology pending.    Postmenopausal bleeding.   Endometrial biopsy pathology pending.     Medical history significant for psoriasis on immunosuppressive therapy.       Plan:   1.)    Non-16/18 hrHPV+:   -If pathology HSIL: RTC to discuss excisional treatment via LEEP.   -If pathology LSIL/negative and hrHPV+: RTC in 1 year for screening with consideration of concomitant colposcopy given immunosuppression.   -If pathology negative and Pap test NILM, hrHPV-: RTC in 3 years for follow-up.    2. ) Postmenopausal bleeding:   -If EIN, endometrial cancer: RTC to discuss treatment  -If benign: Continue symptom follow-up.     3.) Genetic risk factors were assessed: No indication for Genetics referral.     4.) Labs and/or tests ordered include: 1) Pap & HPV co-test. 2) Surgical pathology: Endocervical curettings; endometrial biopsy.  Will notify patient of results via [employer].     5.) Health maintenance issues addressed today include: None.    6.) Code status:  Full-code.    7.) Prescriptions: None.      A total of 30 minutes was spent with the patient, 22 minutes of which were spent in counseling the patient and/or treatment planning, 8minutes of which were spent in procedure time; an additional 5 minutes was spent in chart review/documentation.        Jess Hernandez MD, MS, FACOG, FACS  5/22/2025  4:18 PM            CC  Patient Care Team:  Marco Nunez MD as PCP - General (Internal Medicine)  Moo Montes MD as MD (Dermatology)  Jess Hernandez MD as MD (Gynecologic Oncology)  Jt Winston MD as MD  (Psychiatry)  Jess Hernandez MD as Assigned Cancer Care Provider  Marco Nunez MD as Assigned PCP

## 2025-05-18 NOTE — PATIENT INSTRUCTIONS
SCHEDULING:   -RTC in 1 year for HPV-based testing (MD, in-person, HPV clinic) --order(s) placed       DIAGNOSIS:  Non-16/18 hrHPV+ cervical cancer screening.     Postcoital bleeding. Likely due to prominent cervical ectropion, but order placed for gonorrhea/chlamydia urine testing.       PLAN:  1) non-16/18 hrHPV+: We will notify you of the pathology results via [employer].  -If biopsy HSIL: Plan for excisional procedure.  -If pathology negative and Pap test NILM, hrHPV-: RTC in 3 years for screening.  -If pathology LSIL/negative and hrHPV+: RTC in 1 year for repeat screening.     2) Postmenopausal bleeding: Etiologies include benign conditions such as polyps, pre-cancer (EIN) or cancer.  We will notify you of the biopsy pathology & management by [employer].     Please call with any questions or concerns. [phone]       Jess Hernandez MD, MS, FACOG, FACS  5/22/2025  4:20 PM

## 2025-05-22 ENCOUNTER — OFFICE VISIT (OUTPATIENT)
Dept: ONCOLOGY | Facility: CLINIC | Age: 50
End: 2025-05-22
Attending: OBSTETRICS & GYNECOLOGY
Payer: COMMERCIAL

## 2025-05-22 VITALS
OXYGEN SATURATION: 98 % | TEMPERATURE: 98.1 F | RESPIRATION RATE: 20 BRPM | BODY MASS INDEX: 29.58 KG/M2 | SYSTOLIC BLOOD PRESSURE: 113 MMHG | DIASTOLIC BLOOD PRESSURE: 82 MMHG | WEIGHT: 161.7 LBS | HEART RATE: 76 BPM

## 2025-05-22 DIAGNOSIS — L40.8 OTHER PSORIASIS: ICD-10-CM

## 2025-05-22 DIAGNOSIS — R87.810 HUMAN PAPILLOMAVIRUS (HPV) TYPE 18 DNA DETECTED IN CERVICAL SPECIMEN: ICD-10-CM

## 2025-05-22 DIAGNOSIS — N95.0 POSTMENOPAUSAL BLEEDING: Primary | ICD-10-CM

## 2025-05-22 PROCEDURE — 88305 TISSUE EXAM BY PATHOLOGIST: CPT | Mod: 26 | Performed by: STUDENT IN AN ORGANIZED HEALTH CARE EDUCATION/TRAINING PROGRAM

## 2025-05-22 PROCEDURE — 87624 HPV HI-RISK TYP POOLED RSLT: CPT | Performed by: OBSTETRICS & GYNECOLOGY

## 2025-05-22 PROCEDURE — 88305 TISSUE EXAM BY PATHOLOGIST: CPT | Mod: TC | Performed by: OBSTETRICS & GYNECOLOGY

## 2025-05-22 ASSESSMENT — PAIN SCALES - GENERAL: PAINLEVEL_OUTOF10: NO PAIN (0)

## 2025-05-22 NOTE — Clinical Note
5/22/2025      Everette Mayen  [address] [facility] [id number]      Dear Colleague,    Thank you for referring your patient, Everette Mayen, to the [facility]. Please see a copy of my visit note below.    No notes on file    Again, thank you for allowing me to participate in the care of your patient.        Sincerely,        Jess Hernandez MD    Electronically signed

## 2025-05-22 NOTE — PROGRESS NOTES
"Oncology Rooming Note    May 22, 2025 3:40 PM   Everette Mayen is a 49 year old female who presents for:    Chief Complaint   Patient presents with    Oncology Clinic Visit     Adenomatous colon polyp       Initial Vitals: /82 (BP Location: Left arm, Patient Position: Sitting, Cuff Size: Adult Large)   Pulse 76   Temp 98.1  F (36.7  C) (Oral)   Resp 20   Wt 73.3 kg (161 lb 11.2 oz)   LMP 12/20/2022 (Exact Date)   SpO2 98%   BMI 29.58 kg/m   Estimated body mass index is 29.58 kg/m  as calculated from the following:    Height as of 5/11/25: 1.575 m (5' 2\").    Weight as of this encounter: 73.3 kg (161 lb 11.2 oz). Body surface area is 1.79 meters squared.  No Pain (0) Comment: Data Unavailable   Patient's last menstrual period was 12/20/2022 (exact date).  Allergies reviewed: Yes  Medications reviewed: Yes    Medications: Medication refills not needed today.  Pharmacy name entered into [employer]: [id number] [place], [place] - [address]    Frailty Screening:   Is the patient here for a new oncology consult visit in cancer care? 2. No    PHQ9:  Did this patient require a PHQ9?: No      Clinical concerns: no         Dominique Coley CMA              "

## 2025-05-23 ENCOUNTER — RESULTS FOLLOW-UP (OUTPATIENT)
Dept: ONCOLOGY | Facility: CLINIC | Age: 50
End: 2025-05-23

## 2025-05-29 LAB
BKR AP ASSOCIATED HPV REPORT: ABNORMAL
BKR LAB AP GYN ADEQUACY: ABNORMAL
BKR LAB AP GYN INTERPRETATION: ABNORMAL
BKR LAB AP PREVIOUS ABNL DX: ABNORMAL
BKR LAB AP PREVIOUS ABNORMAL: ABNORMAL
PATH REPORT.COMMENTS IMP SPEC: ABNORMAL
PATH REPORT.COMMENTS IMP SPEC: ABNORMAL
PATH REPORT.RELEVANT HX SPEC: ABNORMAL

## 2025-06-14 ENCOUNTER — HEALTH MAINTENANCE LETTER (OUTPATIENT)
Age: 50
End: 2025-06-14

## 2025-06-17 DIAGNOSIS — F31.76 BIPOLAR DISORDER, IN FULL REMISSION, MOST RECENT EPISODE DEPRESSED: ICD-10-CM

## 2025-06-18 RX ORDER — TRAZODONE HYDROCHLORIDE 50 MG/1
50 TABLET ORAL AT BEDTIME
Qty: 90 TABLET | Refills: 3 | Status: SHIPPED | OUTPATIENT
Start: 2025-06-18

## 2025-06-18 RX ORDER — LURASIDONE HYDROCHLORIDE 20 MG/1
20 TABLET, FILM COATED ORAL
Qty: 90 TABLET | Refills: 3 | Status: SHIPPED | OUTPATIENT
Start: 2025-06-18

## 2025-07-08 ENCOUNTER — OFFICE VISIT (OUTPATIENT)
Dept: FAMILY MEDICINE | Facility: CLINIC | Age: 50
End: 2025-07-08
Payer: COMMERCIAL

## 2025-07-08 VITALS
SYSTOLIC BLOOD PRESSURE: 111 MMHG | TEMPERATURE: 97.9 F | BODY MASS INDEX: 30.05 KG/M2 | HEIGHT: 62 IN | RESPIRATION RATE: 18 BRPM | OXYGEN SATURATION: 97 % | WEIGHT: 163.3 LBS | DIASTOLIC BLOOD PRESSURE: 74 MMHG | HEART RATE: 58 BPM

## 2025-07-08 DIAGNOSIS — L73.9 FOLLICULITIS: ICD-10-CM

## 2025-07-08 DIAGNOSIS — F31.76 BIPOLAR DISORDER, IN FULL REMISSION, MOST RECENT EPISODE DEPRESSED: ICD-10-CM

## 2025-07-08 DIAGNOSIS — E66.3 OVERWEIGHT (BMI 25.0-29.9): ICD-10-CM

## 2025-07-08 DIAGNOSIS — Z13.6 CARDIOVASCULAR SCREENING; LDL GOAL LESS THAN 160: ICD-10-CM

## 2025-07-08 DIAGNOSIS — Z00.00 ROUTINE GENERAL MEDICAL EXAMINATION AT A HEALTH CARE FACILITY: Primary | ICD-10-CM

## 2025-07-08 DIAGNOSIS — D12.6 ADENOMATOUS POLYP OF COLON, UNSPECIFIED PART OF COLON: ICD-10-CM

## 2025-07-08 DIAGNOSIS — I10 BENIGN ESSENTIAL HYPERTENSION: ICD-10-CM

## 2025-07-08 PROCEDURE — 90471 IMMUNIZATION ADMIN: CPT | Performed by: INTERNAL MEDICINE

## 2025-07-08 PROCEDURE — 80061 LIPID PANEL: CPT | Performed by: INTERNAL MEDICINE

## 2025-07-08 PROCEDURE — 3078F DIAST BP <80 MM HG: CPT | Performed by: INTERNAL MEDICINE

## 2025-07-08 PROCEDURE — 90472 IMMUNIZATION ADMIN EACH ADD: CPT | Performed by: INTERNAL MEDICINE

## 2025-07-08 PROCEDURE — 36415 COLL VENOUS BLD VENIPUNCTURE: CPT | Performed by: INTERNAL MEDICINE

## 2025-07-08 PROCEDURE — 1126F AMNT PAIN NOTED NONE PRSNT: CPT | Performed by: INTERNAL MEDICINE

## 2025-07-08 PROCEDURE — 99214 OFFICE O/P EST MOD 30 MIN: CPT | Mod: 25 | Performed by: INTERNAL MEDICINE

## 2025-07-08 PROCEDURE — 90750 HZV VACC RECOMBINANT IM: CPT | Performed by: INTERNAL MEDICINE

## 2025-07-08 PROCEDURE — 99396 PREV VISIT EST AGE 40-64: CPT | Mod: 25 | Performed by: INTERNAL MEDICINE

## 2025-07-08 PROCEDURE — 90746 HEPB VACCINE 3 DOSE ADULT IM: CPT | Performed by: INTERNAL MEDICINE

## 2025-07-08 PROCEDURE — 80048 BASIC METABOLIC PNL TOTAL CA: CPT | Performed by: INTERNAL MEDICINE

## 2025-07-08 PROCEDURE — 3074F SYST BP LT 130 MM HG: CPT | Performed by: INTERNAL MEDICINE

## 2025-07-08 RX ORDER — ADALIMUMAB-ADAZ 40 MG/.4ML
40 INJECTION, SOLUTION SUBCUTANEOUS
COMMUNITY
Start: 2025-06-12

## 2025-07-08 RX ORDER — MUPIROCIN 2 %
OINTMENT (GRAM) TOPICAL 2 TIMES DAILY
Qty: 15 G | Refills: 3 | Status: SHIPPED | OUTPATIENT
Start: 2025-07-08

## 2025-07-08 RX ORDER — LURASIDONE HYDROCHLORIDE 20 MG/1
20 TABLET, FILM COATED ORAL
Qty: 90 TABLET | Refills: 3 | Status: SHIPPED | OUTPATIENT
Start: 2025-07-08

## 2025-07-08 RX ORDER — AMLODIPINE AND BENAZEPRIL HYDROCHLORIDE 5; 10 MG/1; MG/1
1 CAPSULE ORAL DAILY
Qty: 90 CAPSULE | Refills: 3 | Status: SHIPPED | OUTPATIENT
Start: 2025-07-08

## 2025-07-08 RX ORDER — ADALIMUMAB 40MG/0.4ML
KIT SUBCUTANEOUS
COMMUNITY
Start: 2024-09-12 | End: 2025-07-08

## 2025-07-08 RX ORDER — MUPIROCIN 2 %
OINTMENT (GRAM) TOPICAL
COMMUNITY
Start: 2025-05-18 | End: 2025-07-08

## 2025-07-08 RX ORDER — TRAZODONE HYDROCHLORIDE 50 MG/1
50 TABLET ORAL AT BEDTIME
Qty: 90 TABLET | Refills: 3 | Status: SHIPPED | OUTPATIENT
Start: 2025-07-08

## 2025-07-08 SDOH — HEALTH STABILITY: PHYSICAL HEALTH: ON AVERAGE, HOW MANY MINUTES DO YOU ENGAGE IN EXERCISE AT THIS LEVEL?: 40 MIN

## 2025-07-08 SDOH — HEALTH STABILITY: PHYSICAL HEALTH: ON AVERAGE, HOW MANY DAYS PER WEEK DO YOU ENGAGE IN MODERATE TO STRENUOUS EXERCISE (LIKE A BRISK WALK)?: 7 DAYS

## 2025-07-08 ASSESSMENT — SOCIAL DETERMINANTS OF HEALTH (SDOH): HOW OFTEN DO YOU GET TOGETHER WITH FRIENDS OR RELATIVES?: MORE THAN THREE TIMES A WEEK

## 2025-07-08 ASSESSMENT — PAIN SCALES - GENERAL: PAINLEVEL_OUTOF10: NO PAIN (0)

## 2025-07-08 NOTE — PATIENT INSTRUCTIONS
(Z00.00) Routine general medical examination at a health care facility  (primary encounter diagnosis)  Comment: For routine exam, we will draw labs as ordered, cholesterol, diabetes mellitus check, liver function, renal function,  and plan for colonoscopy.  We will also update vaccination history.  Plan:     (I10) Benign essential hypertension  Comment: blood pressure is well controlled - no changes  Plan: amLODIPine-benazepril (LOTREL) 5-10 MG capsule,        Basic metabolic panel            (E66.3) Overweight (BMI 25.0-29.9)  Comment: Discussed risks/benefits of GLP-1 agonists and will start a low dose of tir[facility]epatide today [facility]  Titrate dose as you are able   Plan: tirzepatide-weight management (ZEPBOUND) 2.5         MG/0.5ML vial            (D12.6) Adenomatous polyp of colon, unspecified part of colon  Comment: Plan for colonoscopy in January  Plan:     (F31.76) Bipolar disorder, in full remission, most recent episode depressed  Comment: Continue Latuda and trazodone  Plan: lurasidone (LATUDA) 20 MG TABS tablet,         traZODone (DESYREL) 50 MG tablet            (L73.9) Folliculitis  Comment: OK to refill mupirocin   Plan: mupirocin (BACTROBAN) 2 % external ointment            (Z13.6) CARDIOVASCULAR SCREENING; LDL GOAL LESS THAN 160  Comment:   Plan: Lipid panel reflex to direct LDL Fasting

## 2025-07-08 NOTE — PROGRESS NOTES
Preventive Care Visit  [facility]  Marco Nunez MD, MD, Internal Medicine  Jul 8, 2025        Tyler Akins is a 50 year old, presenting for the following:  Physical (Not fasting )        7/8/2025    11:02 AM   Additional Questions   Roomed by Kishan          [facility]     Advance Care Planning    Patient states has Health Care Directive and will send to [employer].        7/8/2025   General Health   How would you rate your overall physical health? Good   Feel stress (tense, anxious, or unable to sleep) Rather much   (!) STRESS CONCERN      7/8/2025   Nutrition   Three or more servings of calcium each day? Yes   Diet: Regular (no restrictions)   How many servings of fruit and vegetables per day? (!) 2-3   How many sweetened beverages each day? 0-1         7/8/2025   Exercise   Days per week of moderate/strenous exercise 7 days   Average minutes spent exercising at this level 40 min         7/8/2025   Social Factors   Frequency of gathering with friends or relatives More than three times a week   Worry food won't last until get money to buy more No   Food not last or not have enough money for food? No   Do you have housing? (Housing is defined as stable permanent housing and does not include staying outside in a car, in a tent, in an abandoned building, in an overnight shelter, or couch-surfing.) Yes   Are you worried about losing your housing? No   Lack of transportation? No   Unable to get utilities (heat,electricity)? No         7/8/2025   Fall Risk   Fallen 2 or more times in the past year? No   Trouble with walking or balance? No          7/8/2025   Dental   Dentist two times every year? (!) NO         Today's PHQ-2 Score:       7/8/2025     9:04 AM   PHQ-2 ( 1999 [employer])   Q1: Little interest or pleasure in doing things 0   Q2: Feeling down, depressed or hopeless 0   PHQ-2 Score 0    Q1: Little interest or pleasure in doing things Not at all   Q2: Feeling down, depressed or  hopeless Not at all   PHQ-2 Score 0       Patient-reported           7/8/2025   Substance Use   Alcohol more than 3/day or more than 7/wk No   Do you use any other substances recreationally? No     Social History     Tobacco Use    Smoking status: Never     Passive exposure: Never    Smokeless tobacco: Never   Vaping Use    Vaping status: Never Used   Substance Use Topics    Alcohol use: Yes     Alcohol/week: 0.0 standard drinks of alcohol     Comment: < 1 drink / week    Drug use: Yes     Types: Marijuana     Comment: THC           8/14/2024   LAST FHS-7 RESULTS   1st degree relative breast or ovarian cancer No   Any relative bilateral breast cancer No   Any male have breast cancer No   Any ONE woman have BOTH breast AND ovarian cancer No   Any woman with breast cancer before 50yrs No   2 or more relatives with breast AND/OR ovarian cancer No   2 or more relatives with breast AND/OR bowel cancer No                7/8/2025   STI Screening   New sexual partner(s) since last STI/HIV test? No     History of abnormal Pap smear: No - age 30- 64 PAP with HPV every 5 years recommended        Latest Ref Rng & Units 5/22/2025     4:14 PM 5/16/2024     3:09 PM 3/15/2023     2:18 PM   PAP / HPV   PAP  Atypical squamous cells of undetermined significance (ASC-US)  Atypical squamous cells of undetermined significance (ASC-US)  Negative for Intraepithelial Lesion or Malignancy (NILM)    HPV 16 DNA Negative Negative  Negative  Negative    HPV 18 DNA Negative Negative  Positive  Negative    Other HR HPV Negative Positive  Positive  Positive      ASCVD Risk   The 10-year ASCVD risk score (Ollie SCHMITT, et al., 2019) is: 1.2%    Values used to calculate the score:      Age: 50 years      Sex: Female      Is Non- : No      Diabetic: No      Tobacco smoker: No      Systolic Blood Pressure: 111 mmHg      Is BP treated: Yes      HDL Cholesterol: 53 mg/dL      Total Cholesterol: 162 mg/dL       Reviewed and  "updated as needed this visit by Provider                    Past Medical History:   Diagnosis Date    Allergic rhinitis, cause unspecified     Depressive disorder     Depressive disorder, not elsewhere classified     Hypertension 2008    Other psoriasis         Benign essential hypertension   Blood pressure is well controlled with current blood pressure medications.  Notably training for a triathalon and exercising regularly  Overweight (BMI 25.0-29.9)   She has had difficulty with weight gain over the past year and would like to consider a trial of GLP-1.  Has not had any history of pancreatitis nor any family history of thyroid cancer or endocrine tumors.  Adenomatous polyp of colon, unspecified part of colon   Notable for large adenoma with dysplasia and recommmended follow up colonoscopy in January  Bipolar disorder, in full remission, most recent episode depressed   Doing very well managing symptoms with Latuda as well as trazodone and no concerns.        Review of Systems  Constitutional, HEENT, cardiovascular, pulmonary, GI, , musculoskeletal, neuro, skin - has had some sores intranasal that have responded to mupirocin and would like refill, endocrine and psych systems are negative, except as otherwise noted.     Objective    Exam  /74 (BP Location: Left arm, Patient Position: Sitting, Cuff Size: Adult Large)   Pulse 58   Temp 97.9  F (36.6  C) (Oral)   Resp 18   Ht 1.572 m (5' 1.89\")   Wt 74.1 kg (163 lb 4.8 oz)   LMP 12/20/2022 (Exact Date)   SpO2 97%   Breastfeeding No   BMI 29.97 kg/m     Estimated body mass index is 29.97 kg/m  as calculated from the following:    Height as of this encounter: 1.572 m (5' 1.89\").    Weight as of this encounter: 74.1 kg (163 lb 4.8 oz).    Physical Exam  GENERAL: alert and no distress  EYES: Eyes grossly normal to inspection, PERRL and conjunctivae and sclerae normal  HENT: ear canals and TM's normal, nose and mouth without ulcers or lesions  NECK: no " adenopathy, no asymmetry, masses, or scars  RESP: lungs clear to auscultation - no rales, rhonchi or wheezes  CV: regular rate and rhythm, normal S1 S2, no S3 or S4, no murmur, click or rub, no peripheral edema  ABDOMEN: soft, nontender, no hepatosplenomegaly,    MS: no gross musculoskeletal defects noted, no edema  SKIN: no suspicious lesions or rashes  NEURO: Normal strength and tone, mentation intact and speech normal  PSYCH: mentation appears normal, affect normal/bright    Patient Instructions   (Z00.00) Routine general medical examination at a health care facility  (primary encounter diagnosis)  Comment: For routine exam, we will draw labs as ordered, cholesterol, diabetes mellitus check, liver function, renal function,  and plan for colonoscopy.  We will also update vaccination history.  Plan:     (I10) Benign essential hypertension  Comment: blood pressure is well controlled - no changes  Plan: amLODIPine-benazepril (LOTREL) 5-10 MG capsule,        Basic metabolic panel            (E66.3) Overweight (BMI 25.0-29.9)  Comment: Discussed risks/benefits of GLP-1 agonists and will start a low dose of [facility]atide today [facility]  Titrate dose as you are able   Plan: tirzepatide-weight management (ZEPBOUND) 2.5         MG/0.5ML vial            (D12.6) Adenomatous polyp of colon, unspecified part of colon  Comment: Plan for colonoscopy in January  Plan:     (F31.76) Bipolar disorder, in full remission, most recent episode depressed  Comment: Continue Latuda and trazodone  Plan: lurasidone (LATUDA) 20 MG TABS tablet,         traZODone (DESYREL) 50 MG tablet            (L73.9) Folliculitis  Comment: OK to refill mupirocin   Plan: mupirocin (BACTROBAN) 2 % external ointment            (Z13.6) CARDIOVASCULAR SCREENING; LDL GOAL LESS THAN 160  Comment:   Plan: Lipid panel reflex to direct LDL Fasting                  Signed Electronically by: Marco Nunez MD, MD

## 2025-07-09 LAB
ANION GAP SERPL CALCULATED.3IONS-SCNC: 9 MMOL/L (ref 7–15)
BUN SERPL-MCNC: 15.6 MG/DL (ref 6–20)
CALCIUM SERPL-MCNC: 9.2 MG/DL (ref 8.8–10.4)
CHLORIDE SERPL-SCNC: 106 MMOL/L (ref 98–107)
CHOLEST SERPL-MCNC: 184 MG/DL
CREAT SERPL-MCNC: 0.68 MG/DL (ref 0.51–0.95)
EGFRCR SERPLBLD CKD-EPI 2021: >90 ML/MIN/1.73M2
FASTING STATUS PATIENT QL REPORTED: NO
FASTING STATUS PATIENT QL REPORTED: NO
GLUCOSE SERPL-MCNC: 81 MG/DL (ref 70–99)
HCO3 SERPL-SCNC: 24 MMOL/L (ref 22–29)
HDLC SERPL-MCNC: 42 MG/DL
LDLC SERPL CALC-MCNC: 121 MG/DL
NONHDLC SERPL-MCNC: 142 MG/DL
POTASSIUM SERPL-SCNC: 4.8 MMOL/L (ref 3.4–5.3)
SODIUM SERPL-SCNC: 139 MMOL/L (ref 135–145)
TRIGL SERPL-MCNC: 105 MG/DL

## 2025-07-15 ENCOUNTER — PATIENT OUTREACH (OUTPATIENT)
Dept: CARE COORDINATION | Facility: CLINIC | Age: 50
End: 2025-07-15
Payer: COMMERCIAL

## 2025-07-28 ENCOUNTER — MYC MEDICAL ADVICE (OUTPATIENT)
Dept: FAMILY MEDICINE | Facility: CLINIC | Age: 50
End: 2025-07-28
Payer: COMMERCIAL

## 2025-07-28 DIAGNOSIS — E66.3 OVERWEIGHT (BMI 25.0-29.9): Primary | ICD-10-CM

## 2025-07-30 NOTE — TELEPHONE ENCOUNTER
To PCP:    Patient requesting increased dose medication be sent to [facility] then care team to start PA process.    Please route back to care team to start PA once prescribed if appropriate.    Thank you.    Gaby PEDERSON,  Triage RN  [facility] Internal Medicine

## 2025-07-31 ENCOUNTER — TELEPHONE (OUTPATIENT)
Dept: FAMILY MEDICINE | Facility: CLINIC | Age: 50
End: 2025-07-31
Payer: COMMERCIAL

## 2025-07-31 DIAGNOSIS — E66.3 OVERWEIGHT (BMI 25.0-29.9): ICD-10-CM

## 2025-08-18 ENCOUNTER — HOSPITAL ENCOUNTER (OUTPATIENT)
Dept: MAMMOGRAPHY | Facility: CLINIC | Age: 50
Discharge: HOME | End: 2025-08-18
Attending: INTERNAL MEDICINE | Admitting: INTERNAL MEDICINE
Payer: COMMERCIAL

## 2025-08-18 DIAGNOSIS — Z12.31 VISIT FOR SCREENING MAMMOGRAM: ICD-10-CM

## 2025-08-18 PROCEDURE — 77063 BREAST TOMOSYNTHESIS BI: CPT

## (undated) RX ORDER — FENTANYL CITRATE 50 UG/ML
INJECTION, SOLUTION INTRAMUSCULAR; INTRAVENOUS
Status: DISPENSED
Start: 2025-01-14